# Patient Record
Sex: MALE | Race: WHITE | NOT HISPANIC OR LATINO | Employment: OTHER | URBAN - METROPOLITAN AREA
[De-identification: names, ages, dates, MRNs, and addresses within clinical notes are randomized per-mention and may not be internally consistent; named-entity substitution may affect disease eponyms.]

---

## 2017-01-23 NOTE — PROGRESS NOTES
HPI    SUBJECTIVE:                                                    Dago Dangelo is a 56 year old male who presents to clinic today for the following health issues:      RESPIRATORY SYMPTOMS      Duration: 2.5 months off an on    Description  sore throat, cough, headache, fatigue/malaise and chest congestion     Severity: moderate    Accompanying signs and symptoms: None    History (predisposing factors):  tobacco abuse    Precipitating or alleviating factors: None    Therapies tried and outcome:  Was given zithromax 2.5 months ago finished that, Has tried alker seltzer plus with no relief.      No shortness of breath, wheezing.  Cough is generally productive, but not always.  Is generally feeling a bit run down, but cough will often wake him in the night.  No fever, sore throat.  Feels cough is getting more significant.      Review of Systems   Constitutional: Positive for malaise/fatigue. Negative for fever.   HENT: Negative for congestion.    Respiratory: Positive for cough and sputum production.    Gastrointestinal: Negative.          Physical Exam   Constitutional: He is oriented to person, place, and time and well-developed, well-nourished, and in no distress.   Eyes: Conjunctivae and EOM are normal.   Cardiovascular: Normal rate, regular rhythm and normal heart sounds.    Pulmonary/Chest: Effort normal and breath sounds normal.   Musculoskeletal: He exhibits no edema.   Neurological: He is alert and oriented to person, place, and time.   Skin: Skin is warm and dry.   Vitals reviewed.      (R05) Cough  (primary encounter diagnosis)  Comment: normal XR, suspect dose in crease of lisinopril last fall triggered new cough  Plan: XR Chest 2 Views            (Z11.59) Need for hepatitis C screening test  Comment:   Plan: Hepatitis C Screen Reflex to HCV RNA Quant and         Genotype            (I10) Benign essential hypertension  Comment:   Plan: Albumin Random Urine Quantitative, Basic         metabolic  panel            (E11.9) Type 2 diabetes mellitus without complication, without long-term current use of insulin (H)  Comment:   Plan: Hemoglobin A1c, losartan (COZAAR) 100 MG tablet              RTC in 2w    Tl Ortiz MD

## 2017-01-24 ENCOUNTER — RADIANT APPOINTMENT (OUTPATIENT)
Dept: GENERAL RADIOLOGY | Facility: CLINIC | Age: 57
End: 2017-01-24
Attending: FAMILY MEDICINE
Payer: COMMERCIAL

## 2017-01-24 ENCOUNTER — OFFICE VISIT (OUTPATIENT)
Dept: FAMILY MEDICINE | Facility: CLINIC | Age: 57
End: 2017-01-24
Payer: COMMERCIAL

## 2017-01-24 VITALS
BODY MASS INDEX: 35.59 KG/M2 | OXYGEN SATURATION: 97 % | HEART RATE: 62 BPM | RESPIRATION RATE: 16 BRPM | SYSTOLIC BLOOD PRESSURE: 143 MMHG | DIASTOLIC BLOOD PRESSURE: 82 MMHG | WEIGHT: 213.9 LBS | TEMPERATURE: 98.2 F

## 2017-01-24 DIAGNOSIS — E11.9 TYPE 2 DIABETES MELLITUS WITHOUT COMPLICATION, WITHOUT LONG-TERM CURRENT USE OF INSULIN (H): ICD-10-CM

## 2017-01-24 DIAGNOSIS — Z11.59 NEED FOR HEPATITIS C SCREENING TEST: ICD-10-CM

## 2017-01-24 DIAGNOSIS — R05.9 COUGH: ICD-10-CM

## 2017-01-24 DIAGNOSIS — I10 BENIGN ESSENTIAL HYPERTENSION: ICD-10-CM

## 2017-01-24 DIAGNOSIS — R05.9 COUGH: Primary | ICD-10-CM

## 2017-01-24 LAB — HBA1C MFR BLD: 6.8 % (ref 4.3–6)

## 2017-01-24 PROCEDURE — 86803 HEPATITIS C AB TEST: CPT | Performed by: FAMILY MEDICINE

## 2017-01-24 PROCEDURE — 83036 HEMOGLOBIN GLYCOSYLATED A1C: CPT | Performed by: FAMILY MEDICINE

## 2017-01-24 PROCEDURE — 36415 COLL VENOUS BLD VENIPUNCTURE: CPT | Performed by: FAMILY MEDICINE

## 2017-01-24 PROCEDURE — 82043 UR ALBUMIN QUANTITATIVE: CPT | Performed by: FAMILY MEDICINE

## 2017-01-24 PROCEDURE — 71020 XR CHEST 2 VW: CPT

## 2017-01-24 PROCEDURE — 80048 BASIC METABOLIC PNL TOTAL CA: CPT | Performed by: FAMILY MEDICINE

## 2017-01-24 PROCEDURE — 99214 OFFICE O/P EST MOD 30 MIN: CPT | Performed by: FAMILY MEDICINE

## 2017-01-24 RX ORDER — LOSARTAN POTASSIUM 100 MG/1
100 TABLET ORAL DAILY
Qty: 30 TABLET | Refills: 1 | Status: SHIPPED | OUTPATIENT
Start: 2017-01-24 | End: 2017-03-19

## 2017-01-24 ASSESSMENT — ENCOUNTER SYMPTOMS
FEVER: 0
SPUTUM PRODUCTION: 1
GASTROINTESTINAL NEGATIVE: 1
COUGH: 1

## 2017-01-24 NOTE — NURSING NOTE
"Chief Complaint   Patient presents with     Follow Up For     reoccuring cough/bronchitis       Initial /82 mmHg  Pulse 62  Temp(Src) 98.2  F (36.8  C) (Oral)  Resp 16  Wt 213 lb 14.4 oz (97.024 kg)  SpO2 97% Estimated body mass index is 35.59 kg/(m^2) as calculated from the following:    Height as of 10/19/16: 5' 5\" (1.651 m).    Weight as of this encounter: 213 lb 14.4 oz (97.024 kg).  BP completed using cuff size: regular    Kassandra Sonny HUYNH    "

## 2017-01-24 NOTE — MR AVS SNAPSHOT
After Visit Summary   1/24/2017    Dago Dangelo    MRN: [id number]           Patient Information     Date Of Birth          1960        Visit Information        Provider Department      1/24/2017 4:00 PM Tl Ortiz MD [facility]        Today's Diagnoses     Cough    -  1     Need for hepatitis C screening test         Benign essential hypertension         Type 2 diabetes mellitus without complication, without long-term current use of insulin (H)            Follow-ups after your visit        Follow-up notes from your care team     Return in about 2 weeks (around 2/7/2017).      Who to contact     If you have questions or need follow up information about today's clinic visit or your schedule please contact [facility] directly at [phone].  Normal or non-critical lab and imaging results will be communicated to you by [employer], letter or phone within 4 business days after the clinic has received the results. If you do not hear from us within 7 days, please contact the clinic through [employer] or phone. If you have a critical or abnormal lab result, we will notify you by phone as soon as possible.  Submit refill requests through [employer] or call your pharmacy and they will forward the refill request to us. Please allow 3 business days for your refill to be completed.          Additional Information About Your Visit        MyChart Information     [employer] gives you secure access to your electronic health record. If you see a primary care provider, you can also send messages to your care team and make appointments. If you have questions, please call your primary care clinic.  If you do not have a primary care provider, please call [phone] and they will assist you.        Care EveryWhere ID     This is your Care EveryWhere ID. This could be used by other organizations to access your [place] medical records  [id number]        Your Vitals Were      Pulse Temperature Respirations Pulse Oximetry          62 98.2  F (36.8  C) (Oral) 16 97%         Blood Pressure from Last 3 Encounters:   01/24/17 143/82   11/14/16 134/80   10/28/16 115/74    Weight from Last 3 Encounters:   01/24/17 213 lb 14.4 oz (97.024 kg)   11/14/16 212 lb (96.163 kg)   10/19/16 211 lb (95.709 kg)              We Performed the Following     Albumin Random Urine Quantitative     Basic metabolic panel     Hemoglobin A1c     Hepatitis C Screen Reflex to HCV RNA Quant and Genotype          Today's Medication Changes          These changes are accurate as of: 1/24/17  4:52 PM.  If you have any questions, ask your nurse or doctor.               Start taking these medicines.        Dose/Directions    losartan 100 MG tablet   Commonly known as:  COZAAR   Used for:  Benign essential hypertension   Started by:  Tl Ortiz MD        Dose:  100 mg   Take 1 tablet (100 mg) by mouth daily   Quantity:  30 tablet   Refills:  1         Stop taking these medicines if you haven't already. Please contact your care team if you have questions.     lisinopril 40 MG tablet   Commonly known as:  PRINIVIL/ZESTRIL   Stopped by:  Tl Ortiz MD                Where to get your medicines      These medications were sent to [facility] [place] - [address]  [address] [place] [address]     Phone:  [phone]    - losartan 100 MG tablet             Primary Care Provider Office Phone # Fax #    Tl Ortiz [phone]       [facility] [address]  [address] [facility] [address]        Thank you!     Thank you for choosing [facility]  for your care. Our goal is always to provide you with excellent care. Hearing back from our patients is one way we can continue to improve our services. Please take a few minutes to complete the written survey that you may receive in the mail after your visit with us. Thank  you!             Your Updated Medication List - Protect others around you: Learn how to safely use, store and throw away your medicines at www.disposemymeds.org.          This list is accurate as of: 1/24/17  4:52 PM.  Always use your most recent med list.                   Brand Name Dispense Instructions for use    * ACCU-CHEK COMPLETE Kit     1 Box    1 Box 2 times daily Measure blood sugar 2x daily       * blood glucose monitoring meter device kit    no brand specified    1 kit    Use to test blood sugar 2 times daily or as directed. PT HAS NEW PLAN COVERAGE EFFECTIVE 9/1/16, [employer]ERS [employer]ODU[employer]       amLODIPine 5 MG tablet    NORVASC    90 tablet    Take 1 tablet (5 mg) by mouth daily       aspirin 81 MG EC tablet     100 tablet    Take 1 tablet (81 mg) by mouth daily       atorvastatin 40 MG tablet    LIPITOR    90 tablet    Take 1 tablet (40 mg) by mouth At Bedtime       blood glucose calibration solution    no brand specified    1 each    Use to calibrate blood glucose monitor as directed.       blood glucose monitoring test strip    no brand specified    3 Box    Use to test blood sugars 2 times daily or as directed-DISPENSE PER PT INSURANCE PER PT PREFERENCE, EFFECTIVE 9/1/16 PT HAS INSURANCE CHANGE TO [employer]       carvedilol 25 MG tablet    COREG    180 tablet    Take 1 tablet (25 mg) by mouth 2 times daily (with meals)       losartan 100 MG tablet    COZAAR    30 tablet    Take 1 tablet (100 mg) by mouth daily       metFORMIN 500 MG tablet    GLUCOPHAGE    180 tablet    Take 1 tablet (500 mg) by mouth 2 times daily (with meals)       multivitamin, therapeutic with minerals Tabs tablet      Take 1 tablet by mouth daily       nitroglycerin 0.4 MG sublingual tablet    NITROSTAT    25 tablet    Place 1 tablet (0.4 mg) under the tongue every 5 minutes as needed for chest pain if you are still having symptoms after 3 doses (15 minutes) call 911.       order for DME     1 Device    Blood pressure  cuff       ticagrelor 90 MG tablet    BRILINTA    180 tablet    Take 1 tablet (90 mg) by mouth every 12 hours       * varenicline 0.5 MG X 11 & 1 MG X 42 tablet    CHANTIX STARTING MONTH JOANNE    53 tablet    Take 0.5 mg tab daily for 3 days, then 0.5 mg tab twice daily for 4 days, then 1 mg twice daily.       * varenicline 1 MG tablet    CHANTIX    56 tablet    Take 1 tablet (1 mg) by mouth 2 times daily       * Notice:  This list has 4 medication(s) that are the same as other medications prescribed for you. Read the directions carefully, and ask your doctor or other care provider to review them with you.

## 2017-01-26 LAB
ANION GAP SERPL CALCULATED.3IONS-SCNC: 11 MMOL/L (ref 3–14)
BUN SERPL-MCNC: 15 MG/DL (ref 7–30)
CALCIUM SERPL-MCNC: 8.9 MG/DL (ref 8.5–10.1)
CHLORIDE SERPL-SCNC: 112 MMOL/L (ref 94–109)
CO2 SERPL-SCNC: 21 MMOL/L (ref 20–32)
CREAT SERPL-MCNC: 0.83 MG/DL (ref 0.66–1.25)
CREAT UR-MCNC: 149 MG/DL
GFR SERPL CREATININE-BSD FRML MDRD: ABNORMAL ML/MIN/1.7M2
GLUCOSE SERPL-MCNC: 99 MG/DL (ref 70–99)
HCV AB SERPL QL IA: NORMAL
MICROALBUMIN UR-MCNC: 85 MG/L
MICROALBUMIN/CREAT UR: 56.98 MG/G CR (ref 0–17)
POTASSIUM SERPL-SCNC: 4.2 MMOL/L (ref 3.4–5.3)
SODIUM SERPL-SCNC: 144 MMOL/L (ref 133–144)

## 2017-02-08 ENCOUNTER — OFFICE VISIT (OUTPATIENT)
Dept: FAMILY MEDICINE | Facility: CLINIC | Age: 57
End: 2017-02-08
Payer: COMMERCIAL

## 2017-02-08 VITALS
HEIGHT: 65 IN | DIASTOLIC BLOOD PRESSURE: 82 MMHG | RESPIRATION RATE: 14 BRPM | TEMPERATURE: 97.9 F | BODY MASS INDEX: 35.32 KG/M2 | OXYGEN SATURATION: 96 % | SYSTOLIC BLOOD PRESSURE: 136 MMHG | WEIGHT: 212 LBS | HEART RATE: 62 BPM

## 2017-02-08 DIAGNOSIS — R09.81 NASAL CONGESTION: ICD-10-CM

## 2017-02-08 DIAGNOSIS — R05.9 COUGH: Primary | ICD-10-CM

## 2017-02-08 DIAGNOSIS — F17.200 TOBACCO USE DISORDER: ICD-10-CM

## 2017-02-08 DIAGNOSIS — I10 BENIGN ESSENTIAL HYPERTENSION: ICD-10-CM

## 2017-02-08 LAB
FEF 25/75: NORMAL
FEV-1: NORMAL
FEV1/FVC: NORMAL
FVC: NORMAL

## 2017-02-08 PROCEDURE — 94010 BREATHING CAPACITY TEST: CPT | Performed by: FAMILY MEDICINE

## 2017-02-08 PROCEDURE — 99214 OFFICE O/P EST MOD 30 MIN: CPT | Mod: 25 | Performed by: FAMILY MEDICINE

## 2017-02-08 RX ORDER — FLUTICASONE PROPIONATE 50 MCG
1-2 SPRAY, SUSPENSION (ML) NASAL DAILY
Qty: 16 G | Refills: 1 | Status: SHIPPED | OUTPATIENT
Start: 2017-02-08 | End: 2017-07-07

## 2017-02-08 ASSESSMENT — ENCOUNTER SYMPTOMS
GASTROINTESTINAL NEGATIVE: 1
CONSTITUTIONAL NEGATIVE: 1
WHEEZING: 0
COUGH: 1
SPUTUM PRODUCTION: 0
SHORTNESS OF BREATH: 0

## 2017-02-08 NOTE — NURSING NOTE
"Chief Complaint   Patient presents with     Hypertension       Initial /82 mmHg  Pulse 62  Temp(Src) 97.9  F (36.6  C) (Oral)  Resp 14  Ht 5' 5\" (1.651 m)  Wt 212 lb (96.163 kg)  BMI 35.28 kg/m2  SpO2 96% Estimated body mass index is 35.28 kg/(m^2) as calculated from the following:    Height as of this encounter: 5' 5\" (1.651 m).    Weight as of this encounter: 212 lb (96.163 kg).  Medication Reconciliation: complete     Jaclyn Rosales CMA      "

## 2017-02-08 NOTE — PROGRESS NOTES
HPI   SUBJECTIVE:                                                    Dago Dangelo is a 56 year old male who presents to clinic today for the following health issues:    Following up on switch from Lisinopril to Losartan    Hypertension Follow-up      Outpatient blood pressures are being checked at home.  Results are high. 140-150's    Low Salt Diet: no added salt       Amount of exercise or physical activity: None    Problems taking medications regularly: No    Medication side effects: still suffering from cough    Diet: regular (no restrictions)    Checks home BP in the morning but has tested home meter against our and may run a bit high.  Numbers are pretty stable since switch to losartan.  Does not feel that cough is about the same, noting that he often wakes with sgnificant nasal congestion, when this starts to clear up, coughing typically starts.      Review of Systems   Constitutional: Negative.    HENT: Positive for congestion.    Respiratory: Positive for cough. Negative for sputum production, shortness of breath and wheezing.    Gastrointestinal: Negative.          Physical Exam   Constitutional: He is oriented to person, place, and time and well-developed, well-nourished, and in no distress.   Eyes: Conjunctivae and EOM are normal.   Cardiovascular: Normal rate, regular rhythm and normal heart sounds.    Pulmonary/Chest: Effort normal and breath sounds normal.   Musculoskeletal: He exhibits no edema.   Neurological: He is alert and oriented to person, place, and time.   Skin: Skin is warm and dry.   Vitals reviewed.      (R05) Cough  (primary encounter diagnosis)  Comment: technical problems with spirometery - unable to interpret  Plan: Spirometry, Breathing Capacity: Normal Order,         Clinic Performed            (R09.81) Nasal congestion  Comment:   Plan: nasal steroid, claritin    (F17.200) Tobacco use disorder  Comment:   Plan: Spirometry, Breathing Capacity: Normal Order,         Clinic  Performed            (I10) Benign essential hypertension  Comment:   Plan: increase amlodipine to 10mg      RTC in 2w    Tl Ortiz MD

## 2017-02-08 NOTE — MR AVS SNAPSHOT
After Visit Summary   2/8/2017    Dago Dangelo    MRN: [id number]           Patient Information     Date Of Birth          1960        Visit Information        Provider Department      2/8/2017 4:00 PM Tl Ortiz MD [facility]        Today's Diagnoses     Cough    -  1     Nasal congestion         Tobacco use disorder         Benign essential hypertension            Follow-ups after your visit        Follow-up notes from your care team     Return in about 2 weeks (around 2/22/2017).      Who to contact     If you have questions or need follow up information about today's clinic visit or your schedule please contact [facility] directly at [phone].  Normal or non-critical lab and imaging results will be communicated to you by MyChart, letter or phone within 4 business days after the clinic has received the results. If you do not hear from us within 7 days, please contact the clinic through "[employer]hart or phone. If you have a critical or abnormal lab result, we will notify you by phone as soon as possible.  Submit refill requests through [employer] or call your pharmacy and they will forward the refill request to us. Please allow 3 business days for your refill to be completed.          Additional Information About Your Visit        MyChart Information     [employer] gives you secure access to your electronic health record. If you see a primary care provider, you can also send messages to your care team and make appointments. If you have questions, please call your primary care clinic.  If you do not have a primary care provider, please call [phone] and they will assist you.        Care EveryWhere ID     This is your Care EveryWhere ID. This could be used by other organizations to access your [place] medical records  [id number]        Your Vitals Were     Pulse Temperature Respirations Height BMI (Body Mass Index) Pulse Oximetry    62 97.9  F (36.6  " C) (Oral) 14 5' 5\" (1.651 m) 35.28 kg/m2 96%       Blood Pressure from Last 3 Encounters:   02/08/17 136/82   01/24/17 143/82   11/14/16 134/80    Weight from Last 3 Encounters:   02/08/17 212 lb (96.163 kg)   01/24/17 213 lb 14.4 oz (97.024 kg)   11/14/16 212 lb (96.163 kg)              We Performed the Following     Spirometry, Breathing Capacity: Normal Order, Clinic Performed          Today's Medication Changes          These changes are accurate as of: 2/8/17  4:30 PM.  If you have any questions, ask your nurse or doctor.               Start taking these medicines.        Dose/Directions    fluticasone 50 MCG/ACT spray   Commonly known as:  FLONASE   Used for:  Nasal congestion, Cough   Started by:  Tl Ortiz MD        Dose:  1-2 spray   Spray 1-2 sprays into both nostrils daily   Quantity:  16 g   Refills:  1            Where to get your medicines      These medications were sent to [facility] [place], [place] - [address] [facility]  [address] [address] [facility] [address]     Phone:  [phone]    - fluticasone 50 MCG/ACT spray             Primary Care Provider Office Phone # Fax #    Tl Ortiz [phone]       [facility] [address]  [address] [facility] [address]        Thank you!     Thank you for choosing [facility]  for your care. Our goal is always to provide you with excellent care. Hearing back from our patients is one way we can continue to improve our services. Please take a few minutes to complete the written survey that you may receive in the mail after your visit with us. Thank you!             Your Updated Medication List - Protect others around you: Learn how to safely use, store and throw away your medicines at www.disposemymeds.org.          This list is accurate as of: 2/8/17  4:30 PM.  Always use your most recent med list.                   Brand Name Dispense Instructions for use    * ACCU-CHEK COMPLETE Kit "     1 Box    1 Box 2 times daily Measure blood sugar 2x daily       * blood glucose monitoring meter device kit    no brand specified    1 kit    Use to test blood sugar 2 times daily or as directed. PT HAS NEW PLAN COVERAGE EFFECTIVE 9/1/16, COVERS [employer]ODU[employer]       amLODIPine 5 MG tablet    NORVASC    90 tablet    Take 1 tablet (5 mg) by mouth daily       aspirin 81 MG EC tablet     100 tablet    Take 1 tablet (81 mg) by mouth daily       atorvastatin 40 MG tablet    LIPITOR    90 tablet    Take 1 tablet (40 mg) by mouth At Bedtime       blood glucose calibration solution    no brand specified    1 each    Use to calibrate blood glucose monitor as directed.       blood glucose monitoring test strip    no brand specified    3 Box    Use to test blood sugars 2 times daily or as directed-DISPENSE PER PT INSURANCE PER PT PREFERENCE, EFFECTIVE 9/1/16 PT HAS INSURANCE CHANGE TO [employer]       carvedilol 25 MG tablet    COREG    180 tablet    Take 1 tablet (25 mg) by mouth 2 times daily (with meals)       fluticasone 50 MCG/ACT spray    FLONASE    16 g    Spray 1-2 sprays into both nostrils daily       losartan 100 MG tablet    COZAAR    30 tablet    Take 1 tablet (100 mg) by mouth daily       metFORMIN 500 MG tablet    GLUCOPHAGE    180 tablet    Take 1 tablet (500 mg) by mouth 2 times daily (with meals)       multivitamin, therapeutic with minerals Tabs tablet      Take 1 tablet by mouth daily       nitroglycerin 0.4 MG sublingual tablet    NITROSTAT    25 tablet    Place 1 tablet (0.4 mg) under the tongue every 5 minutes as needed for chest pain if you are still having symptoms after 3 doses (15 minutes) call 911.       order for DME     1 Device    Blood pressure cuff       ticagrelor 90 MG tablet    BRILINTA    180 tablet    Take 1 tablet (90 mg) by mouth every 12 hours       * varenicline 0.5 MG X 11 & 1 MG X 42 tablet    CHANTIX STARTING MONTH JOANNE    53 tablet    Take 0.5 mg tab daily for 3 days, then  0.5 mg tab twice daily for 4 days, then 1 mg twice daily.       * varenicline 1 MG tablet    CHANTIX    56 tablet    Take 1 tablet (1 mg) by mouth 2 times daily       * Notice:  This list has 4 medication(s) that are the same as other medications prescribed for you. Read the directions carefully, and ask your doctor or other care provider to review them with you.

## 2017-02-09 DIAGNOSIS — I10 BENIGN ESSENTIAL HYPERTENSION: ICD-10-CM

## 2017-02-09 DIAGNOSIS — I21.4 NON-STEMI (NON-ST ELEVATED MYOCARDIAL INFARCTION) (H): Primary | ICD-10-CM

## 2017-02-09 NOTE — TELEPHONE ENCOUNTER
Norvasc 5mg         Last Written Prescription Date: 11/14/2016  Last Fill Quantity: 90, # refills: 1    Last Office Visit with G, P or [facility] prescribing provider:  02/08/2017   Future Office Visit:      BP Readings from Last 3 Encounters:   02/08/17 136/82   01/24/17 143/82   11/14/16 134/80     ALT       33   12/19/2015  CHOL      131   3/25/2016  HDL       31   3/25/2016  LDL       44   3/25/2016  TRIG      279   3/25/2016  No results found for this basename: tyler Will CPhT  [facility]  [phone]

## 2017-02-09 NOTE — TELEPHONE ENCOUNTER
Pt is taking two a day. We would need a new script that states that. Pt is okay through friday. Thank you.  Caitlyn Will CPhT  [facility]  [phone]

## 2017-02-10 RX ORDER — AMLODIPINE BESYLATE 5 MG/1
10 TABLET ORAL DAILY
Qty: 180 TABLET | Refills: 1 | Status: SHIPPED | OUTPATIENT
Start: 2017-02-10 | End: 2017-09-22

## 2017-02-10 NOTE — TELEPHONE ENCOUNTER
Norvasc 5 mg  Routing refill request to provider for review/approval because:Medication is reported/historical (Pt is taking two a day we would need a new script that states that. Pt is okay through Friday)    Thank You   Alessio Wolff RPh [facility]

## 2017-03-01 ENCOUNTER — OFFICE VISIT (OUTPATIENT)
Dept: FAMILY MEDICINE | Facility: CLINIC | Age: 57
End: 2017-03-01
Payer: COMMERCIAL

## 2017-03-01 VITALS
DIASTOLIC BLOOD PRESSURE: 80 MMHG | TEMPERATURE: 98.7 F | RESPIRATION RATE: 20 BRPM | WEIGHT: 210 LBS | HEART RATE: 68 BPM | OXYGEN SATURATION: 96 % | SYSTOLIC BLOOD PRESSURE: 126 MMHG | BODY MASS INDEX: 34.95 KG/M2

## 2017-03-01 DIAGNOSIS — I10 BENIGN ESSENTIAL HYPERTENSION: Primary | ICD-10-CM

## 2017-03-01 PROCEDURE — 99213 OFFICE O/P EST LOW 20 MIN: CPT | Performed by: FAMILY MEDICINE

## 2017-03-01 ASSESSMENT — ENCOUNTER SYMPTOMS
SPUTUM PRODUCTION: 0
CARDIOVASCULAR NEGATIVE: 1
CONSTITUTIONAL NEGATIVE: 1
COUGH: 1
SHORTNESS OF BREATH: 0

## 2017-03-01 NOTE — PROGRESS NOTES
HPI    SUBJECTIVE:                                                    Dago Dangelo is a 56 year old male who presents to clinic today for the following health issues:      Hypertension Follow-up      Outpatient blood pressures are being checked at home.  Results are 140/80-85.    Low Salt Diet: no added salt       Amount of exercise or physical activity: None    Problems taking medications regularly: No    Medication side effects: none  Diet: regular (no restrictions)    Checked BP this am, about an hour after taking medication.  No side effects from medication.    Notes cough is still present but is decidedly better.  Did fill Rx for Chantix, looking to start next week.    Review of Systems   Constitutional: Negative.    HENT: Negative.    Respiratory: Positive for cough. Negative for sputum production and shortness of breath.    Cardiovascular: Negative.  Negative for leg swelling.         Physical Exam   Constitutional: He is oriented to person, place, and time and well-developed, well-nourished, and in no distress.   Eyes: Conjunctivae and EOM are normal.   Cardiovascular: Normal rate, regular rhythm and normal heart sounds.    Pulmonary/Chest: Effort normal and breath sounds normal.   Musculoskeletal: He exhibits no edema.   Neurological: He is alert and oriented to person, place, and time.   Skin: Skin is warm and dry.   Vitals reviewed.    (I10) Benign essential hypertension  (primary encounter diagnosis)  Comment: seems well  controlled with increase to 10mg amlopdine  Plan: contnue      RTC in     Tl Ortiz MD

## 2017-03-01 NOTE — NURSING NOTE
"Chief Complaint   Patient presents with     Hypertension       Initial /80 (BP Location: Right arm, Patient Position: Chair, Cuff Size: Adult Regular)  Pulse 68  Temp 98.7  F (37.1  C) (Oral)  Resp 20  Wt 210 lb (95.3 kg)  SpO2 96%  BMI 34.95 kg/m2 Estimated body mass index is 34.95 kg/(m^2) as calculated from the following:    Height as of 2/8/17: 5' 5\" (1.651 m).    Weight as of this encounter: 210 lb (95.3 kg).  Medication Reconciliation: complete   Shelley Yañez MA      "

## 2017-03-01 NOTE — MR AVS SNAPSHOT
After Visit Summary   3/1/2017    Dago Dangelo    MRN: [id number]           Patient Information     Date Of Birth          1960        Visit Information        Provider Department      3/1/2017 4:00 PM Tl Ortiz MD [facility]        Today's Diagnoses     Benign essential hypertension    -  1       Follow-ups after your visit        Follow-up notes from your care team     Return in about 1 month (around 4/1/2017).      Who to contact     If you have questions or need follow up information about today's clinic visit or your schedule please contact [facility] directly at [phone].  Normal or non-critical lab and imaging results will be communicated to you by [employer], letter or phone within 4 business days after the clinic has received the results. If you do not hear from us within 7 days, please contact the clinic through [employer] or phone. If you have a critical or abnormal lab result, we will notify you by phone as soon as possible.  Submit refill requests through [employer] or call your pharmacy and they will forward the refill request to us. Please allow 3 business days for your refill to be completed.          Additional Information About Your Visit        MyChart Information     [employer] gives you secure access to your electronic health record. If you see a primary care provider, you can also send messages to your care team and make appointments. If you have questions, please call your primary care clinic.  If you do not have a primary care provider, please call [phone] and they will assist you.        Care EveryWhere ID     This is your Care EveryWhere ID. This could be used by other organizations to access your [place] medical records  [id number]        Your Vitals Were     Pulse Temperature Respirations Pulse Oximetry BMI (Body Mass Index)       68 98.7  F (37.1  C) (Oral) 20 96% 34.95 kg/m2        Blood Pressure from Last 3  Encounters:   03/01/17 126/80   02/08/17 136/82   01/24/17 143/82    Weight from Last 3 Encounters:   03/01/17 210 lb (95.3 kg)   02/08/17 212 lb (96.2 kg)   01/24/17 213 lb 14.4 oz (97 kg)              Today, you had the following     No orders found for display       Primary Care Provider Office Phone # Fax #    Tl Jimmie Ortiz [phone]       [facility] [address]  [facility] [address]        Thank you!     Thank you for choosing [facility]  for your care. Our goal is always to provide you with excellent care. Hearing back from our patients is one way we can continue to improve our services. Please take a few minutes to complete the written survey that you may receive in the mail after your visit with us. Thank you!             Your Updated Medication List - Protect others around you: Learn how to safely use, store and throw away your medicines at www.disposemymeds.org.          This list is accurate as of: 3/1/17  4:25 PM.  Always use your most recent med list.                   Brand Name Dispense Instructions for use    * ACCU-CHEK COMPLETE Kit     1 Box    1 Box 2 times daily Measure blood sugar 2x daily       * blood glucose monitoring meter device kit    no brand specified    1 kit    Use to test blood sugar 2 times daily or as directed. PT HAS NEW PLAN COVERAGE EFFECTIVE 9/1/16, COVERS CONTOUR PRODUCTS       amLODIPine 5 MG tablet    NORVASC    180 tablet    Take 2 tablets (10 mg) by mouth daily       aspirin 81 MG EC tablet     100 tablet    Take 1 tablet (81 mg) by mouth daily       atorvastatin 40 MG tablet    LIPITOR    90 tablet    Take 1 tablet (40 mg) by mouth At Bedtime       blood glucose calibration solution    no brand specified    1 each    Use to calibrate blood glucose monitor as directed.       blood glucose monitoring test strip    no brand specified    3 Box    Use to test blood sugars 2 times daily or as directed-DISPENSE PER PT  INSURANCE PER PT PREFERENCE, EFFECTIVE 9/1/16 PT HAS INSURANCE CHANGE TO CONTOUR PRODUCTS       carvedilol 25 MG tablet    COREG    180 tablet    Take 1 tablet (25 mg) by mouth 2 times daily (with meals)       fluticasone 50 MCG/ACT spray    FLONASE    16 g    Spray 1-2 sprays into both nostrils daily       losartan 100 MG tablet    COZAAR    30 tablet    Take 1 tablet (100 mg) by mouth daily       metFORMIN 500 MG tablet    GLUCOPHAGE    180 tablet    Take 1 tablet (500 mg) by mouth 2 times daily (with meals)       multivitamin, therapeutic with minerals Tabs tablet      Take 1 tablet by mouth daily       nitroglycerin 0.4 MG sublingual tablet    NITROSTAT    25 tablet    Place 1 tablet (0.4 mg) under the tongue every 5 minutes as needed for chest pain if you are still having symptoms after 3 doses (15 minutes) call 911.       order for DME     1 Device    Blood pressure cuff       ticagrelor 90 MG tablet    BRILINTA    180 tablet    Take 1 tablet (90 mg) by mouth every 12 hours       * varenicline 0.5 MG X 11 & 1 MG X 42 tablet    CHANTIX STARTING MONTH JOANNE    53 tablet    Take 0.5 mg tab daily for 3 days, then 0.5 mg tab twice daily for 4 days, then 1 mg twice daily.       * varenicline 1 MG tablet    CHANTIX    56 tablet    Take 1 tablet (1 mg) by mouth 2 times daily       * Notice:  This list has 4 medication(s) that are the same as other medications prescribed for you. Read the directions carefully, and ask your doctor or other care provider to review them with you.

## 2017-03-19 ENCOUNTER — MYC REFILL (OUTPATIENT)
Dept: FAMILY MEDICINE | Facility: CLINIC | Age: 57
End: 2017-03-19

## 2017-03-19 DIAGNOSIS — I10 BENIGN ESSENTIAL HYPERTENSION: ICD-10-CM

## 2017-03-20 RX ORDER — LOSARTAN POTASSIUM 100 MG/1
100 TABLET ORAL DAILY
Qty: 90 TABLET | Refills: 1 | Status: SHIPPED | OUTPATIENT
Start: 2017-03-20 | End: 2017-09-22

## 2017-03-20 NOTE — TELEPHONE ENCOUNTER
losartan (COZAAR) 100 MG tablet 30 tablet 1 1/24/2017  --   Sig: Take 1 tablet (100 mg) by mouth daily           Last Written Prescription Date: 1.24.17  Last Fill Quantity: 30, # refills: 1  Last Office Visit with [facility] or [facility] prescribing provider: 3.1.17       Potassium   Date Value Ref Range Status   01/24/2017 4.2 3.4 - 5.3 mmol/L Final     Creatinine   Date Value Ref Range Status   01/24/2017 0.83 0.66 - 1.25 mg/dL Final     BP Readings from Last 3 Encounters:   03/01/17 126/80   02/08/17 136/82   01/24/17 143/82     Prescription approved per [facility] Refill Protocol  Thi Garcia RN BS

## 2017-03-20 NOTE — TELEPHONE ENCOUNTER
Message from MyChart:  Original authorizing provider: MD Jono Villagran would like a refill of the following medications:  losartan (COZAAR) 100 MG tablet [Tl Ortiz MD]    Preferred pharmacy: [facility], [place] - [address] [address]    Comment:

## 2017-04-03 ENCOUNTER — OFFICE VISIT (OUTPATIENT)
Dept: FAMILY MEDICINE | Facility: CLINIC | Age: 57
End: 2017-04-03

## 2017-04-03 VITALS
SYSTOLIC BLOOD PRESSURE: 138 MMHG | TEMPERATURE: 98.1 F | DIASTOLIC BLOOD PRESSURE: 80 MMHG | OXYGEN SATURATION: 96 % | HEART RATE: 64 BPM | BODY MASS INDEX: 35.48 KG/M2 | WEIGHT: 213.2 LBS

## 2017-04-03 DIAGNOSIS — Z53.9 ERRONEOUS ENCOUNTER--DISREGARD: Primary | ICD-10-CM

## 2017-04-03 ASSESSMENT — ENCOUNTER SYMPTOMS
NEUROLOGICAL NEGATIVE: 1
CARDIOVASCULAR NEGATIVE: 1
RESPIRATORY NEGATIVE: 1

## 2017-04-03 NOTE — NURSING NOTE
"Chief Complaint   Patient presents with     Medication Follow-up       Initial /80 (BP Location: Right arm, Patient Position: Chair, Cuff Size: Adult Large)  Pulse 64  Temp 98.1  F (36.7  C) (Oral)  Wt 213 lb 3.2 oz (96.7 kg)  SpO2 96%  BMI 35.48 kg/m2 Estimated body mass index is 35.48 kg/(m^2) as calculated from the following:    Height as of 2/8/17: 5' 5\" (1.651 m).    Weight as of this encounter: 213 lb 3.2 oz (96.7 kg).  BP completed using cuff size: kevan Yañez MA    "

## 2017-04-03 NOTE — PROGRESS NOTES
HPI    SUBJECTIVE:                                                    Dago Dangelo is a 56 year old male who presents to clinic today for the following health issues:    Medication Followup of all of them:     Taking Medication as prescribed: yes    Side Effects:  None    Medication Helping Symptoms:  yes     Is using Chantix, still smoking but less.  Smoking about 1/2 ppd.  NO CP, dyspnea, palpitations.    Unsure about why he was coming in - thought we needed to do labs, but was advised to f/u in 3m.  Last seen about 2 weeks ago.  No concerns with medications, just got refills.    Review of Systems   Respiratory: Negative.    Cardiovascular: Negative.    Neurological: Negative.          Physical Exam   Constitutional: He is well-developed, well-nourished, and in no distress.   Psychiatric: Mood and affect normal.   Vitals reviewed.    No charge - erroneous visit.

## 2017-04-03 NOTE — MR AVS SNAPSHOT
After Visit Summary   4/3/2017    Dago Dangelo    MRN: [id number]           Patient Information     Date Of Birth          1960        Visit Information        Provider Department      4/3/2017 4:00 PM Tl Ortiz MD [facility]        Today's Diagnoses     ERRONEOUS ENCOUNTER--DISREGARD    -  1       Follow-ups after your visit        Follow-up notes from your care team     Return in about 3 months (around 7/3/2017).      Who to contact     If you have questions or need follow up information about today's clinic visit or your schedule please contact [facility] directly at [phone].  Normal or non-critical lab and imaging results will be communicated to you by MyChart, letter or phone within 4 business days after the clinic has received the results. If you do not hear from us within 7 days, please contact the clinic through [employer] or phone. If you have a critical or abnormal lab result, we will notify you by phone as soon as possible.  Submit refill requests through [employer] or call your pharmacy and they will forward the refill request to us. Please allow 3 business days for your refill to be completed.          Additional Information About Your Visit        MyChart Information     [employer] gives you secure access to your electronic health record. If you see a primary care provider, you can also send messages to your care team and make appointments. If you have questions, please call your primary care clinic.  If you do not have a primary care provider, please call [phone] and they will assist you.        Care EveryWhere ID     This is your Care EveryWhere ID. This could be used by other organizations to access your [place] medical records  [id number]        Your Vitals Were     Pulse Temperature Pulse Oximetry BMI (Body Mass Index)          64 98.1  F (36.7  C) (Oral) 96% 35.48 kg/m2         Blood Pressure from Last 3 Encounters:    04/03/17 138/80   03/01/17 126/80   02/08/17 136/82    Weight from Last 3 Encounters:   04/03/17 213 lb 3.2 oz (96.7 kg)   03/01/17 210 lb (95.3 kg)   02/08/17 212 lb (96.2 kg)              Today, you had the following     No orders found for display       Primary Care Provider Office Phone # Fax #    Tl Jimmie Ortiz [phone]       [facility] [address]  [facility] [address]        Thank you!     Thank you for choosing [facility]  for your care. Our goal is always to provide you with excellent care. Hearing back from our patients is one way we can continue to improve our services. Please take a few minutes to complete the written survey that you may receive in the mail after your visit with us. Thank you!             Your Updated Medication List - Protect others around you: Learn how to safely use, store and throw away your medicines at www.disposemymeds.org.          This list is accurate as of: 4/3/17  4:12 PM.  Always use your most recent med list.                   Brand Name Dispense Instructions for use    * ACCU-CHEK COMPLETE Kit     1 Box    1 Box 2 times daily Measure blood sugar 2x daily       * blood glucose monitoring meter device kit    no brand specified    1 kit    Use to test blood sugar 2 times daily or as directed. PT HAS NEW PLAN COVERAGE EFFECTIVE 9/1/16, COVERS CONTOUR PRODUCTS       amLODIPine 5 MG tablet    NORVASC    180 tablet    Take 2 tablets (10 mg) by mouth daily       aspirin 81 MG EC tablet     100 tablet    Take 1 tablet (81 mg) by mouth daily       atorvastatin 40 MG tablet    LIPITOR    90 tablet    Take 1 tablet (40 mg) by mouth At Bedtime       blood glucose calibration solution    no brand specified    1 each    Use to calibrate blood glucose monitor as directed.       blood glucose monitoring test strip    no brand specified    3 Box    Use to test blood sugars 2 times daily or as directed-DISPENSE PER PT INSURANCE  PER PT PREFERENCE, EFFECTIVE 9/1/16 PT HAS INSURANCE CHANGE TO [employer]ODUCTS       carvedilol 25 MG tablet    COREG    180 tablet    Take 1 tablet (25 mg) by mouth 2 times daily (with meals)       fluticasone 50 MCG/ACT spray    FLONASE    16 g    Spray 1-2 sprays into both nostrils daily       losartan 100 MG tablet    COZAAR    90 tablet    Take 1 tablet (100 mg) by mouth daily       metFORMIN 500 MG tablet    GLUCOPHAGE    180 tablet    Take 1 tablet (500 mg) by mouth 2 times daily (with meals)       multivitamin, therapeutic with minerals Tabs tablet      Take 1 tablet by mouth daily       nitroglycerin 0.4 MG sublingual tablet    NITROSTAT    25 tablet    Place 1 tablet (0.4 mg) under the tongue every 5 minutes as needed for chest pain if you are still having symptoms after 3 doses (15 minutes) call 911.       order for DME     1 Device    Blood pressure cuff       ticagrelor 90 MG tablet    BRILINTA    180 tablet    Take 1 tablet (90 mg) by mouth every 12 hours       * varenicline 0.5 MG X 11 & 1 MG X 42 tablet    CHANTIX STARTING MONTH JOANNE    53 tablet    Take 0.5 mg tab daily for 3 days, then 0.5 mg tab twice daily for 4 days, then 1 mg twice daily.       * varenicline 1 MG tablet    CHANTIX    56 tablet    Take 1 tablet (1 mg) by mouth 2 times daily       * Notice:  This list has 4 medication(s) that are the same as other medications prescribed for you. Read the directions carefully, and ask your doctor or other care provider to review them with you.

## 2017-04-23 ENCOUNTER — MYC REFILL (OUTPATIENT)
Dept: FAMILY MEDICINE | Facility: CLINIC | Age: 57
End: 2017-04-23

## 2017-04-23 DIAGNOSIS — I21.4 NON-STEMI (NON-ST ELEVATED MYOCARDIAL INFARCTION) (H): ICD-10-CM

## 2017-04-24 RX ORDER — CARVEDILOL 25 MG/1
25 TABLET ORAL 2 TIMES DAILY WITH MEALS
Qty: 180 TABLET | Refills: 0 | Status: SHIPPED | OUTPATIENT
Start: 2017-04-24 | End: 2017-07-22

## 2017-04-24 NOTE — TELEPHONE ENCOUNTER
See below, RTC July,  refill extended, sent mychart response  BP Readings from Last 2 Encounters:   04/03/17 138/80   03/01/17 126/80     Lab Results   Component Value Date    CR 0.83 01/24/2017     Lab Results   Component Value Date    POTASSIUM 4.2 01/24/2017       Prescription approved per [facility] Refill Protocol.  Delilah Guallpa RN, BSN

## 2017-04-24 NOTE — TELEPHONE ENCOUNTER
Message from [employer]:  Original authorizing provider: MD Jono Villagran would like a refill of the following medications:  carvedilol (COREG) 25 MG tablet [Tl Ortiz MD]    Preferred pharmacy: [facility] [address] [address]    Comment:

## 2017-04-27 ENCOUNTER — TELEPHONE (OUTPATIENT)
Dept: FAMILY MEDICINE | Facility: CLINIC | Age: 57
End: 2017-04-27

## 2017-04-27 NOTE — TELEPHONE ENCOUNTER
Panel Management Review      Patient has the following on his problem list:     Diabetes    ASA: Passed    Last A1C  Lab Results   Component Value Date    A1C 6.8 01/24/2017    A1C 6.5 09/21/2016    A1C 6.6 03/25/2016    A1C 6.6 12/18/2015     A1C tested: Passed    Last LDL:    Lab Results   Component Value Date    CHOL 131 03/25/2016     Lab Results   Component Value Date    HDL 31 03/25/2016     Lab Results   Component Value Date    LDL 44 03/25/2016     Lab Results   Component Value Date    TRIG 279 03/25/2016     No results found for: CHOLHDLRATIO  Lab Results   Component Value Date    NHDL 100 03/25/2016       Is the patient on a Statin? YES             Is the patient on Aspirin? YES    Medications     HMG CoA Reductase Inhibitors    atorvastatin (LIPITOR) 40 MG tablet    Salicylates    aspirin 81 MG EC tablet          Last three blood pressure readings:  BP Readings from Last 3 Encounters:   04/03/17 138/80   03/01/17 126/80   02/08/17 136/82       Date of last diabetes office visit: 3/25/16     Tobacco History:     History   Smoking Status     Current Some Day Smoker     Packs/day: 0.50     Years: 30.00     Types: Cigarettes   Smokeless Tobacco     Never Used     Comment: may have one once weekly         Hypertension   Last three blood pressure readings:  BP Readings from Last 3 Encounters:   04/03/17 138/80   03/01/17 126/80   02/08/17 136/82     Blood pressure: Passed    HTN Guidelines:  Age 18-59 BP range:  Less than 140/90  Age 60-85 with Diabetes:  Less than 140/90  Age 60-85 without Diabetes:  less than 150/90      Composite cancer screening  Chart review shows that this patient is due/due soon for the following None  Summary:    Patient is due/failing the following:   PHYSICAL    Action needed:   Patient needs office visit for physical with fasting labs.    Type of outreach:    Sent [employer] message.    Questions for provider review:    None                                                                                                                                     Maggi Pepe MA     Chart routed to Care Team .

## 2017-04-27 NOTE — LETTER
[address], [address]  [facility] [address]  [phone]  May 11, 2017    Dago Dangelo  [address] [facility] [address]      Dear Dago,    I care about your health and have reviewed your health plan.  I have reviewed your medical conditions, medication list, and lab results and am making recommendations  based on this review, to better manage your health.    You are particularly in need of attention regarding:  -Wellness (Physical) Visit    I am recommending that you:  -schedule a WELLNESS (Physical) APPOINTMENT with me.   I will check fasting labs the same day - nothing to eat except water and meds for 8-10 hours prior.      Here is a list of Health Maintenance topics that are due now or due soon:  Health Maintenance Due   Topic Date Due     ADVANCE DIRECTIVE PLANNING Q5 YRS (NO INBASKET)  11/05/1977     FOOT EXAM Q1 YEAR( NO INBASKET)  03/25/2017     LIPID MONITORING Q1 YEAR( NO INBASKET)  03/25/2017     EYE EXAM Q1 YEAR( NO INBASKET)  04/28/2017       Please call us at [phone] (or use [employer]) to address the above   recommendations.    Thank you for trusting [facility] and we appreciate the opportunity to serve you.  We look forward to supporting your healthcare needs in the future.    Healthy Regards,    Tl Ortiz MD

## 2017-04-30 ENCOUNTER — MYC REFILL (OUTPATIENT)
Dept: FAMILY MEDICINE | Facility: CLINIC | Age: 57
End: 2017-04-30

## 2017-04-30 DIAGNOSIS — I21.4 NON-STEMI (NON-ST ELEVATED MYOCARDIAL INFARCTION) (H): ICD-10-CM

## 2017-05-01 NOTE — TELEPHONE ENCOUNTER
Message from [employer]:  Original authorizing provider: MD Jono Conner would like a refill of the following medications:  ticagrelor (BRILINTA) 90 MG tablet [Jamia Sánchez MD]    Preferred pharmacy: [facility] [address] [address]    Comment:

## 2017-07-07 ENCOUNTER — OFFICE VISIT (OUTPATIENT)
Dept: FAMILY MEDICINE | Facility: CLINIC | Age: 57
End: 2017-07-07
Payer: COMMERCIAL

## 2017-07-07 VITALS
TEMPERATURE: 99 F | BODY MASS INDEX: 34.78 KG/M2 | DIASTOLIC BLOOD PRESSURE: 80 MMHG | OXYGEN SATURATION: 95 % | SYSTOLIC BLOOD PRESSURE: 130 MMHG | RESPIRATION RATE: 16 BRPM | WEIGHT: 209 LBS | HEART RATE: 74 BPM

## 2017-07-07 DIAGNOSIS — F17.200 TOBACCO USE DISORDER: ICD-10-CM

## 2017-07-07 DIAGNOSIS — E78.5 HYPERLIPIDEMIA LDL GOAL <70: ICD-10-CM

## 2017-07-07 DIAGNOSIS — I10 BENIGN ESSENTIAL HYPERTENSION: Primary | ICD-10-CM

## 2017-07-07 DIAGNOSIS — E11.9 TYPE 2 DIABETES MELLITUS WITHOUT COMPLICATION, WITHOUT LONG-TERM CURRENT USE OF INSULIN (H): ICD-10-CM

## 2017-07-07 LAB
CHOLEST SERPL-MCNC: 175 MG/DL
HBA1C MFR BLD: 6.7 % (ref 4.3–6)
HDLC SERPL-MCNC: 30 MG/DL
LDLC SERPL CALC-MCNC: ABNORMAL MG/DL
LDLC SERPL DIRECT ASSAY-MCNC: 68 MG/DL
NONHDLC SERPL-MCNC: 145 MG/DL
TRIGL SERPL-MCNC: 684 MG/DL

## 2017-07-07 PROCEDURE — 36415 COLL VENOUS BLD VENIPUNCTURE: CPT | Performed by: FAMILY MEDICINE

## 2017-07-07 PROCEDURE — 83721 ASSAY OF BLOOD LIPOPROTEIN: CPT | Performed by: FAMILY MEDICINE

## 2017-07-07 PROCEDURE — 99214 OFFICE O/P EST MOD 30 MIN: CPT | Performed by: FAMILY MEDICINE

## 2017-07-07 PROCEDURE — 83036 HEMOGLOBIN GLYCOSYLATED A1C: CPT | Performed by: FAMILY MEDICINE

## 2017-07-07 PROCEDURE — 80061 LIPID PANEL: CPT | Performed by: FAMILY MEDICINE

## 2017-07-07 ASSESSMENT — ENCOUNTER SYMPTOMS
SENSORY CHANGE: 0
BLURRED VISION: 0
DOUBLE VISION: 0
TINGLING: 0
GASTROINTESTINAL NEGATIVE: 1
NEUROLOGICAL NEGATIVE: 1
CARDIOVASCULAR NEGATIVE: 1
RESPIRATORY NEGATIVE: 1
CONSTITUTIONAL NEGATIVE: 1

## 2017-07-07 NOTE — MR AVS SNAPSHOT
After Visit Summary   7/7/2017    Dago Dangelo    MRN: [id number]           Patient Information     Date Of Birth          1960        Visit Information        Provider Department      7/7/2017 7:00 AM Tl Ortiz MD [facility]        Today's Diagnoses     Benign essential hypertension    -  1    Tobacco use disorder        Type 2 diabetes mellitus without complication, without long-term current use of insulin (H)        Hyperlipidemia LDL goal <70           Follow-ups after your visit        Follow-up notes from your care team     Return in about 6 months (around 1/7/2018).      Who to contact     If you have questions or need follow up information about today's clinic visit or your schedule please contact [facility] directly at [phone].  Normal or non-critical lab and imaging results will be communicated to you by [employer], letter or phone within 4 business days after the clinic has received the results. If you do not hear from us within 7 days, please contact the clinic through [employer] or phone. If you have a critical or abnormal lab result, we will notify you by phone as soon as possible.  Submit refill requests through [employer] or call your pharmacy and they will forward the refill request to us. Please allow 3 business days for your refill to be completed.          Additional Information About Your Visit        MyChart Information     [employer] gives you secure access to your electronic health record. If you see a primary care provider, you can also send messages to your care team and make appointments. If you have questions, please call your primary care clinic.  If you do not have a primary care provider, please call [phone] and they will assist you.        Care EveryWhere ID     This is your Care EveryWhere ID. This could be used by other organizations to access your [place] medical records  [id number]        Your Vitals Were      Pulse Temperature Respirations Pulse Oximetry BMI (Body Mass Index)       74 99  F (37.2  C) (Oral) 16 95% 34.78 kg/m2        Blood Pressure from Last 3 Encounters:   07/07/17 130/80   04/03/17 138/80   03/01/17 126/80    Weight from Last 3 Encounters:   07/07/17 209 lb (94.8 kg)   04/03/17 213 lb 3.2 oz (96.7 kg)   03/01/17 210 lb (95.3 kg)              We Performed the Following     Hemoglobin A1c     Lipid panel reflex to direct LDL        Primary Care Provider Office Phone # Fax #    Tl Jimmie Ortiz [phone]       [facility] [address]  [facility] [address]        Equal Access to Services     CRISTIAN LYNN : Hadii rafita vicente hadasho Soomaali, waaxda luqadaha, qaybta kaalmada adeegyada, waxay sandiin haybhavnan alec grissom. So [facility] [phone].    ATENCIÓN: Si habla español, tiene a alejandre disposición servicios gratuitos de asistencia lingüística. Llame al [phone].    We comply with applicable federal civil rights laws and [place] laws. We do not discriminate on the basis of race, color, national origin, age, disability sex, sexual orientation or gender identity.            Thank you!     Thank you for choosing [facility]  for your care. Our goal is always to provide you with excellent care. Hearing back from our patients is one way we can continue to improve our services. Please take a few minutes to complete the written survey that you may receive in the mail after your visit with us. Thank you!             Your Updated Medication List - Protect others around you: Learn how to safely use, store and throw away your medicines at www.disposemymeds.org.          This list is accurate as of: 7/7/17  7:41 AM.  Always use your most recent med list.                   Brand Name Dispense Instructions for use Diagnosis    * ACCU-CHEK COMPLETE Kit     1 Box    1 Box 2 times daily Measure blood sugar 2x daily    Type 2 diabetes mellitus without complication  (H)       * blood glucose monitoring meter device kit    no brand specified    1 kit    Use to test blood sugar 2 times daily or as directed. PT HAS NEW PLAN COVERAGE EFFECTIVE 9/1/16, COVERS CONTOUR PRODUCTS    Type 2 diabetes mellitus without complication (H)       amLODIPine 5 MG tablet    NORVASC    180 tablet    Take 2 tablets (10 mg) by mouth daily    Non-STEMI (non-ST elevated myocardial infarction) (H), Benign essential hypertension       aspirin 81 MG EC tablet     100 tablet    Take 1 tablet (81 mg) by mouth daily    Non-STEMI (non-ST elevated myocardial infarction) (H)       atorvastatin 40 MG tablet    LIPITOR    90 tablet    Take 1 tablet (40 mg) by mouth At Bedtime    Non-STEMI (non-ST elevated myocardial infarction) (H)       blood glucose calibration solution    no brand specified    1 each    Use to calibrate blood glucose monitor as directed.    Type 2 diabetes mellitus without complication (H)       blood glucose monitoring test strip    no brand specified    3 Box    Use to test blood sugars 2 times daily or as directed-DISPENSE PER PT INSURANCE PER PT PREFERENCE, EFFECTIVE 9/1/16 PT HAS INSURANCE CHANGE TO [employer]ODUCTS    Type 2 diabetes mellitus without complication (H)       carvedilol 25 MG tablet    COREG    180 tablet    Take 1 tablet (25 mg) by mouth 2 times daily (with meals)    Non-STEMI (non-ST elevated myocardial infarction) (H)       losartan 100 MG tablet    COZAAR    90 tablet    Take 1 tablet (100 mg) by mouth daily    Benign essential hypertension       metFORMIN 500 MG tablet    GLUCOPHAGE    180 tablet    Take 1 tablet (500 mg) by mouth 2 times daily (with meals)    Type 2 diabetes mellitus without complication, without long-term current use of insulin (H)       multivitamin, therapeutic with minerals Tabs tablet      Take 1 tablet by mouth daily        nitroGLYcerin 0.4 MG sublingual tablet    NITROSTAT    25 tablet    Place 1 tablet (0.4 mg) under the tongue every 5  minutes as needed for chest pain if you are still having symptoms after 3 doses (15 minutes) call 911.    Non-STEMI (non-ST elevated myocardial infarction) (H)       order for DME     1 Device    Blood pressure cuff    Benign essential hypertension       ticagrelor 90 MG tablet    BRILINTA    180 tablet    Take 1 tablet (90 mg) by mouth every 12 hours    Non-STEMI (non-ST elevated myocardial infarction) (H)       * varenicline 0.5 MG X 11 & 1 MG X 42 tablet    CHANTIX STARTING MONTH JOANNE    53 tablet    Take 0.5 mg tab daily for 3 days, then 0.5 mg tab twice daily for 4 days, then 1 mg twice daily.    Tobacco use disorder       * varenicline 1 MG tablet    CHANTIX    56 tablet    Take 1 tablet (1 mg) by mouth 2 times daily    Tobacco use disorder       * Notice:  This list has 4 medication(s) that are the same as other medications prescribed for you. Read the directions carefully, and ask your doctor or other care provider to review them with you.

## 2017-07-07 NOTE — NURSING NOTE
"Chief Complaint   Patient presents with     Diabetes     fasting        Initial /80 (BP Location: Right arm, Patient Position: Chair, Cuff Size: Adult Large)  Pulse 74  Temp 99  F (37.2  C) (Oral)  Resp 16  Wt 209 lb (94.8 kg)  SpO2 95%  BMI 34.78 kg/m2 Estimated body mass index is 34.78 kg/(m^2) as calculated from the following:    Height as of 2/8/17: 5' 5\" (1.651 m).    Weight as of this encounter: 209 lb (94.8 kg).  Medication Reconciliation: complete.Jose MELENDREZ MA      "

## 2017-07-07 NOTE — PROGRESS NOTES
HPI    SUBJECTIVE:                                                    Dago Dangelo is a 56 year old male who presents to clinic today for the following health issues:      Diabetes Follow-up      Patient is checking blood sugars: on occasion     Diabetic concerns: None     Symptoms of hypoglycemia (low blood sugar): none     Paresthesias (numbness or burning in feet) or sores: No     Date of last diabetic eye exam: 5/2017    Hyperlipidemia Follow-Up      Rate your low fat/cholesterol diet?: not monitoring fat    Taking statin?  Yes, no muscle aches from statin    Other lipid medications/supplements?:  none    Hypertension Follow-up      Outpatient blood pressures are being checked at home.  Results are usually close to 140/80.    Low Salt Diet: no added salt      Amount of exercise or physical activity: none besides work    Problems taking medications regularly: No    Medication side effects: dry skin    Diet: regular (no restrictions)        Feeling well, no acute concerns.  Last eye exam May'17, no issues per Jono.  Did try Chantix, wasn't able to make it work.        Review of Systems   Constitutional: Negative.    Eyes: Negative for blurred vision and double vision.   Respiratory: Negative.    Cardiovascular: Negative.    Gastrointestinal: Negative.    Neurological: Negative.  Negative for tingling and sensory change.         Physical Exam   Constitutional: He is oriented to person, place, and time and well-developed, well-nourished, and in no distress.   Eyes: Conjunctivae and EOM are normal.   Cardiovascular: Normal rate, regular rhythm and normal heart sounds.    Pulses:       Dorsalis pedis pulses are 2+ on the right side, and 2+ on the left side.   Pulmonary/Chest: Effort normal and breath sounds normal.   Musculoskeletal: He exhibits no edema.   Neurological: He is alert and oriented to person, place, and time. He displays no weakness. Gait normal.   Reflex Scores:       Patellar reflexes are 2+ on the  right side and 2+ on the left side.       Achilles reflexes are 2+ on the right side and 2+ on the left side.  Nl filament and proprioception in feet JOSIE   Skin: Skin is warm and dry.   Skin on feet healthy.  No wounds, callous, onychomycosis.   Vitals reviewed.    (I10) Benign essential hypertension  (primary encounter diagnosis)  Comment: =  Plan: well conctrolled today    (F17.200) Tobacco use disorder  Comment:   Plan: will try again someday    (E11.9) Type 2 diabetes mellitus without complication, without long-term current use of insulin (H)  Comment: Stable, approriate  Plan: Hemoglobin A1c, Lipid panel reflex to direct         LDL            (E78.5) Hyperlipidemia LDL goal <70  Comment: rechecking  Plan: Lipid panel reflex to direct LDL              RTC in 6m    Tl Ortiz MD

## 2017-07-10 PROBLEM — E78.1 HYPERTRIGLYCERIDEMIA: Status: ACTIVE | Noted: 2017-07-10

## 2017-07-11 ENCOUNTER — MYC MEDICAL ADVICE (OUTPATIENT)
Dept: FAMILY MEDICINE | Facility: CLINIC | Age: 57
End: 2017-07-11

## 2017-07-11 DIAGNOSIS — E78.1 HYPERTRIGLYCERIDEMIA: Primary | ICD-10-CM

## 2017-07-22 ENCOUNTER — MYC REFILL (OUTPATIENT)
Dept: FAMILY MEDICINE | Facility: CLINIC | Age: 57
End: 2017-07-22

## 2017-07-22 DIAGNOSIS — I21.4 NON-STEMI (NON-ST ELEVATED MYOCARDIAL INFARCTION) (H): ICD-10-CM

## 2017-07-24 ENCOUNTER — TELEPHONE (OUTPATIENT)
Dept: FAMILY MEDICINE | Facility: CLINIC | Age: 57
End: 2017-07-24

## 2017-07-24 DIAGNOSIS — E11.9 TYPE 2 DIABETES MELLITUS WITHOUT COMPLICATION, WITHOUT LONG-TERM CURRENT USE OF INSULIN (H): Primary | ICD-10-CM

## 2017-07-24 RX ORDER — CARVEDILOL 25 MG/1
25 TABLET ORAL 2 TIMES DAILY WITH MEALS
Qty: 180 TABLET | Refills: 3 | Status: SHIPPED | OUTPATIENT
Start: 2017-07-24 | End: 2018-07-22

## 2017-07-24 NOTE — TELEPHONE ENCOUNTER
carvedilol (COREG) 25 MG tablet 180 tablet 0 4/24/2017  No   Sig: Take 1 tablet (25 mg) by mouth 2 times daily (with meals)   Class: E-Prescribe           Last Written Prescription Date: 4.24.17  Last Fill Quantity: 180, # refills: 0  Last Office Visit with [facility] or [facility] prescribing provider: 7.7.17       Potassium   Date Value Ref Range Status   01/24/2017 4.2 3.4 - 5.3 mmol/L Final     Creatinine   Date Value Ref Range Status   01/24/2017 0.83 0.66 - 1.25 mg/dL Final     BP Readings from Last 3 Encounters:   07/07/17 130/80   04/03/17 138/80   03/01/17 126/80         Prescription approved per [facility] Refill Protocol  Thi Garcia RN BS

## 2017-07-24 NOTE — TELEPHONE ENCOUNTER
We had received new scripts for a meter and test strips. Wondering if we could also get a script for Lancets. Thank you.    Caitlyn Will, [facility]  [phone]

## 2017-07-24 NOTE — TELEPHONE ENCOUNTER
Message from [employer]:  Original authorizing provider: MD Jono Villagran would like a refill of the following medications:  carvedilol (COREG) 25 MG tablet [Tl Ortiz MD]    Preferred pharmacy: [facility] [id number] [address]    Comment:

## 2017-09-22 DIAGNOSIS — E11.9 TYPE 2 DIABETES MELLITUS WITHOUT COMPLICATION, WITHOUT LONG-TERM CURRENT USE OF INSULIN (H): ICD-10-CM

## 2017-09-22 DIAGNOSIS — I21.4 NON-STEMI (NON-ST ELEVATED MYOCARDIAL INFARCTION) (H): ICD-10-CM

## 2017-09-22 DIAGNOSIS — I10 BENIGN ESSENTIAL HYPERTENSION: ICD-10-CM

## 2017-09-22 RX ORDER — ATORVASTATIN CALCIUM 40 MG/1
TABLET, FILM COATED ORAL
Qty: 90 TABLET | Refills: 2 | Status: SHIPPED | OUTPATIENT
Start: 2017-09-22 | End: 2018-07-04

## 2017-09-22 RX ORDER — AMLODIPINE BESYLATE 5 MG/1
TABLET ORAL
Qty: 180 TABLET | Refills: 1 | Status: SHIPPED | OUTPATIENT
Start: 2017-09-22 | End: 2018-03-18

## 2017-09-22 RX ORDER — LOSARTAN POTASSIUM 100 MG/1
TABLET ORAL
Qty: 90 TABLET | Refills: 1 | Status: SHIPPED | OUTPATIENT
Start: 2017-09-22 | End: 2018-03-18

## 2017-09-22 NOTE — TELEPHONE ENCOUNTER
atorvastatin (LIPITOR) 40 MG tablet     Last Written Prescription Date: 6/20/17  Last Fill Quantity: 90, # refills: 0  Last Office Visit with [facility], [facility] or [facility] prescribing provider: 7/7/2017       Lab Results   Component Value Date    CHOL 175 07/07/2017     Lab Results   Component Value Date    HDL 30 07/07/2017     Lab Results   Component Value Date    LDL  07/07/2017     Cannot estimate LDL when triglyceride exceeds 400 mg/dL    LDL 68 07/07/2017     Lab Results   Component Value Date    TRIG 684 07/07/2017     No results found for: CHOLHDLRATIO    ________________________________________________________________________________    metFORMIN (GLUCOPHAGE) 500 MG tablet         Last Written Prescription Date: 6/20/17  Last Fill Quantity: 180, # refills: 0  Last Office Visit with [facility], [facility] or [facility] prescribing provider:  7/7/2017        BP Readings from Last 3 Encounters:   07/07/17 130/80   04/03/17 138/80   03/01/17 126/80     Lab Results   Component Value Date    MICROL 85 01/24/2017     Lab Results   Component Value Date    UMALCR 56.98 01/24/2017     Creatinine   Date Value Ref Range Status   01/24/2017 0.83 0.66 - 1.25 mg/dL Final   ]  GFR Estimate   Date Value Ref Range Status   01/24/2017 >90  Non  GFR Calc   >60 mL/min/1.7m2 Final   01/05/2016 87 >60 mL/min/1.7m2 Final   12/22/2015 84 >60 mL/min/1.7m2 Final     Comment:     Non  GFR Calc     GFR Estimate If Black   Date Value Ref Range Status   01/24/2017 >90   GFR Calc   >60 mL/min/1.7m2 Final   01/05/2016 >90 >60 mL/min/1.7m2 Final   12/22/2015 >90   GFR Calc   >60 mL/min/1.7m2 Final     Lab Results   Component Value Date    CHOL 175 07/07/2017     Lab Results   Component Value Date    HDL 30 07/07/2017     Lab Results   Component Value Date    LDL  07/07/2017     Cannot estimate LDL when triglyceride exceeds 400 mg/dL    LDL 68 07/07/2017     Lab Results   Component Value Date    TRIG  684 07/07/2017     No results found for: CHOLHDLRATIO  Lab Results   Component Value Date    AST 19 12/19/2015     Lab Results   Component Value Date    ALT 33 12/19/2015     Lab Results   Component Value Date    A1C 6.7 07/07/2017    A1C 6.8 01/24/2017    A1C 6.5 09/21/2016    A1C 6.6 03/25/2016    A1C 6.6 12/18/2015     Potassium   Date Value Ref Range Status   01/24/2017 4.2 3.4 - 5.3 mmol/L Final     ________________________________________________________________________________    amLODIPine (NORVASC) 5 MG tablet      Last Written Prescription Date: 2/10/17  Last Fill Quantity: 180, # refills: 1    Last Office Visit with [facility], [facility] or  Health prescribing provider:  7/7/2017       BP Readings from Last 3 Encounters:   07/07/17 130/80   04/03/17 138/80   03/01/17 126/80     ________________________________________________________________________________    losartan (COZAAR) 100 MG tablet      Last Written Prescription Date: 3/20/17  Last Fill Quantity: 90, # refills: 1  Last Office Visit with [facility], [facility] or [facility] prescribing provider: 7/7/2017         Potassium   Date Value Ref Range Status   01/24/2017 4.2 3.4 - 5.3 mmol/L Final     Creatinine   Date Value Ref Range Status   01/24/2017 0.83 0.66 - 1.25 mg/dL Final     BP Readings from Last 3 Encounters:   07/07/17 130/80   04/03/17 138/80   03/01/17 126/80         YANET Chowdhury  September 22, 2017  8:58 AM

## 2017-09-22 NOTE — TELEPHONE ENCOUNTER
Prescription approved per [facility] Refill Protocol.  Brunilda Mansfield RN

## 2017-10-05 ENCOUNTER — ALLIED HEALTH/NURSE VISIT (OUTPATIENT)
Dept: NURSING | Facility: CLINIC | Age: 57
End: 2017-10-05
Payer: COMMERCIAL

## 2017-10-05 DIAGNOSIS — E78.1 HYPERTRIGLYCERIDEMIA: ICD-10-CM

## 2017-10-05 DIAGNOSIS — Z23 NEED FOR PROPHYLACTIC VACCINATION AND INOCULATION AGAINST INFLUENZA: Primary | ICD-10-CM

## 2017-10-05 LAB
CHOLEST SERPL-MCNC: 168 MG/DL
HDLC SERPL-MCNC: 33 MG/DL
LDLC SERPL CALC-MCNC: ABNORMAL MG/DL
LDLC SERPL DIRECT ASSAY-MCNC: 68 MG/DL
NONHDLC SERPL-MCNC: 135 MG/DL
TRIGL SERPL-MCNC: 574 MG/DL

## 2017-10-05 PROCEDURE — 80061 LIPID PANEL: CPT | Performed by: FAMILY MEDICINE

## 2017-10-05 PROCEDURE — 90686 IIV4 VACC NO PRSV 0.5 ML IM: CPT

## 2017-10-05 PROCEDURE — 83721 ASSAY OF BLOOD LIPOPROTEIN: CPT | Mod: 59 | Performed by: FAMILY MEDICINE

## 2017-10-05 PROCEDURE — 36415 COLL VENOUS BLD VENIPUNCTURE: CPT | Performed by: FAMILY MEDICINE

## 2017-10-05 PROCEDURE — 99207 ZZC NO CHARGE NURSE ONLY: CPT

## 2017-10-05 PROCEDURE — 90471 IMMUNIZATION ADMIN: CPT

## 2017-10-05 NOTE — MR AVS SNAPSHOT
After Visit Summary   10/5/2017    Dago Dangelo    MRN: [id number]           Patient Information     Date Of Birth          1960        Visit Information        Provider Department      10/5/2017 11:00 AM FM NURSE [facility]        Today's Diagnoses     Need for prophylactic vaccination and inoculation against influenza    -  1       Follow-ups after your visit        Your next 10 appointments already scheduled     Oct 05, 2017 11:00 AM CDT   Nurse Only with FM NURSE   [facility]    [address] [place] [address], [address]  [facility] [address] [phone]              Who to contact     If you have questions or need follow up information about today's clinic visit or your schedule please contact [facility] directly at [phone].  Normal or non-critical lab and imaging results will be communicated to you by MyChart, letter or phone within 4 business days after the clinic has received the results. If you do not hear from us within 7 days, please contact the clinic through MyChart or phone. If you have a critical or abnormal lab result, we will notify you by phone as soon as possible.  Submit refill requests through [employer] or call your pharmacy and they will forward the refill request to us. Please allow 3 business days for your refill to be completed.          Additional Information About Your Visit        MyChart Information     [employer] gives you secure access to your electronic health record. If you see a primary care provider, you can also send messages to your care team and make appointments. If you have questions, please call your primary care clinic.  If you do not have a primary care provider, please call [phone] and they will assist you.        Care EveryWhere ID     This is your Care EveryWhere ID. This could be used by other organizations to access your [place] medical records  [id number]          Blood Pressure from Last 3 Encounters:   07/07/17 130/80   04/03/17 138/80   03/01/17 126/80    Weight from Last 3 Encounters:   07/07/17 209 lb (94.8 kg)   04/03/17 213 lb 3.2 oz (96.7 kg)   03/01/17 210 lb (95.3 kg)              We Performed the Following     FLU VAC, SPLIT VIRUS IM > 3 YO (QUADRIVALENT) [83320]     Vaccine Administration, Initial [51315]        Primary Care Provider Office Phone # Fax #    Tl Jimmie Ortiz [phone] [address]  [facility] [address]        Equal Access to Services     CRISTIAN LYNN : Hadii rafita saalmanca Sonikhil, waaxda luqadaha, qaybta kaalmada adejuliann, dianna grissom. So [facility] [phone].    ATENCIÓN: Si habla español, tiene a alejandre disposición servicios gratuitos de asistencia lingüística. Llame al [phone].    We comply with applicable federal civil rights laws and [place] laws. We do not discriminate on the basis of race, color, national origin, age, disability, sex, sexual orientation, or gender identity.            Thank you!     Thank you for choosing [facility]  for your care. Our goal is always to provide you with excellent care. Hearing back from our patients is one way we can continue to improve our services. Please take a few minutes to complete the written survey that you may receive in the mail after your visit with us. Thank you!             Your Updated Medication List - Protect others around you: Learn how to safely use, store and throw away your medicines at www.disposemymeds.org.          This list is accurate as of: 10/5/17  9:28 AM.  Always use your most recent med list.                   Brand Name Dispense Instructions for use Diagnosis    * ACCU-CHEK COMPLETE Kit     1 Box    1 Box 2 times daily Measure blood sugar 2x daily    Type 2 diabetes mellitus without complication (H)       * blood glucose monitoring meter device kit    no brand specified    1 kit    Use to  test blood sugar 2 times daily or as directed. PT HAS NEW PLAN COVERAGE EFFECTIVE 9/1/16, COVERS CONTOUR PRODUCTS    Type 2 diabetes mellitus without complication (H)       amLODIPine 5 MG tablet    NORVASC    180 tablet    TAKE TWO TABLETS BY MOUTH EVERY DAY    Non-STEMI (non-ST elevated myocardial infarction) (H), Benign essential hypertension       aspirin 81 MG EC tablet     100 tablet    Take 1 tablet (81 mg) by mouth daily    Non-STEMI (non-ST elevated myocardial infarction) (H)       atorvastatin 40 MG tablet    LIPITOR    90 tablet    TAKE ONE TABLET BY MOUTH EVERY DAY AT BEDTIME    Non-STEMI (non-ST elevated myocardial infarction) (H)       blood glucose calibration solution    no brand specified    1 each    Use to calibrate blood glucose monitor as directed.    Type 2 diabetes mellitus without complication (H)       blood glucose lancets standard    no brand specified    100 each    Use to test blood sugar 2 times daily or as directed.    Type 2 diabetes mellitus without complication, without long-term current use of insulin (H)       blood glucose monitoring test strip    no brand specified    3 Box    Use to test blood sugars 2 times daily or as directed-DISPENSE PER PT INSURANCE PER PT PREFERENCE, EFFECTIVE 9/1/16 PT HAS INSURANCE CHANGE TO [employer]    Type 2 diabetes mellitus without complication (H)       carvedilol 25 MG tablet    COREG    180 tablet    Take 1 tablet (25 mg) by mouth 2 times daily (with meals)    Non-STEMI (non-ST elevated myocardial infarction) (H)       losartan 100 MG tablet    COZAAR    90 tablet    TAKE ONE TABLET BY MOUTH EVERY DAY    Benign essential hypertension       metFORMIN 500 MG tablet    GLUCOPHAGE    180 tablet    TAKE ONE TABLET BY MOUTH TWICE A DAY WITH MEALS    Type 2 diabetes mellitus without complication, without long-term current use of insulin (H)       multivitamin, therapeutic with minerals Tabs tablet      Take 1 tablet by mouth daily         nitroGLYcerin 0.4 MG sublingual tablet    NITROSTAT    25 tablet    Place 1 tablet (0.4 mg) under the tongue every 5 minutes as needed for chest pain if you are still having symptoms after 3 doses (15 minutes) call 911.    Non-STEMI (non-ST elevated myocardial infarction) (H)       order for DME     1 Device    Blood pressure cuff    Benign essential hypertension       ticagrelor 90 MG tablet    BRILINTA    180 tablet    Take 1 tablet (90 mg) by mouth every 12 hours    Non-STEMI (non-ST elevated myocardial infarction) (H)       * varenicline 0.5 MG X 11 & 1 MG X 42 tablet    CHANTIX STARTING MONTH JOANNE    53 tablet    Take 0.5 mg tab daily for 3 days, then 0.5 mg tab twice daily for 4 days, then 1 mg twice daily.    Tobacco use disorder       * varenicline 1 MG tablet    CHANTIX    56 tablet    Take 1 tablet (1 mg) by mouth 2 times daily    Tobacco use disorder       * Notice:  This list has 4 medication(s) that are the same as other medications prescribed for you. Read the directions carefully, and ask your doctor or other care provider to review them with you.

## 2017-10-05 NOTE — PROGRESS NOTES
Injectable Influenza Immunization Documentation    1.  Is the person to be vaccinated sick today?   No    2. Does the person to be vaccinated have an allergy to a component   of the vaccine?   No    3. Has the person to be vaccinated ever had a serious reaction   to influenza vaccine in the past?   No    4. Has the person to be vaccinated ever had Guillain-Barré syndrome?   No    Form completed by Jono

## 2017-10-22 DIAGNOSIS — I21.4 NON-STEMI (NON-ST ELEVATED MYOCARDIAL INFARCTION) (H): ICD-10-CM

## 2017-10-24 RX ORDER — ASPIRIN 81 MG
TABLET, DELAYED RELEASE (ENTERIC COATED) ORAL
Qty: 100 TABLET | Refills: 2 | Status: SHIPPED | OUTPATIENT
Start: 2017-10-24 | End: 2018-02-20

## 2017-11-17 ENCOUNTER — OFFICE VISIT (OUTPATIENT)
Dept: FAMILY MEDICINE | Facility: CLINIC | Age: 57
End: 2017-11-17
Payer: COMMERCIAL

## 2017-11-17 VITALS
RESPIRATION RATE: 20 BRPM | WEIGHT: 215.7 LBS | SYSTOLIC BLOOD PRESSURE: 126 MMHG | DIASTOLIC BLOOD PRESSURE: 80 MMHG | BODY MASS INDEX: 35.89 KG/M2 | TEMPERATURE: 99 F | HEART RATE: 80 BPM

## 2017-11-17 DIAGNOSIS — R07.89 ATYPICAL CHEST PAIN: Primary | ICD-10-CM

## 2017-11-17 DIAGNOSIS — R45.4 IRRITABILITY: ICD-10-CM

## 2017-11-17 PROCEDURE — 99214 OFFICE O/P EST MOD 30 MIN: CPT | Performed by: FAMILY MEDICINE

## 2017-11-17 ASSESSMENT — ENCOUNTER SYMPTOMS
PALPITATIONS: 0
ORTHOPNEA: 0
NERVOUS/ANXIOUS: 0
HEADACHES: 0
GASTROINTESTINAL NEGATIVE: 1
CONSTITUTIONAL NEGATIVE: 1
INSOMNIA: 0
SHORTNESS OF BREATH: 0
COUGH: 0

## 2017-11-17 NOTE — PROGRESS NOTES
HPI    SUBJECTIVE:   Dago Dangelo is a 57 year old male who presents to clinic today for the following health issues:    Abnormal Mood Symptoms  Onset: x2 days    Description:   Depression: no  Anxiety: YES- work related    Accompanying Signs & Symptoms:  Still participating in activities that you used to enjoy: no  Fatigue: YES  Irritability: YES  Difficulty concentrating: no  Changes in appetite: no  Problems with sleep: no  Heart racing/beating fast : no  Thoughts of hurting yourself or others: none    History:   Recent stress: YES- job related  Prior depression hospitalization: None  Family history of depression: no  Family history of anxiety: no    Precipitating factors:   Alcohol/drug use: no    Alleviating factors:  Looking for a new job; has a 3 year old granddaughter that brings shira    Therapies Tried and outcome: None    Had a really tough day at work a few days ago.  Later that night while quiet at home had a sudden onset of sharp central chest pain.  Off the last couple of days, did pop into work this afternoon and pain happened again, although lighter.  Didn't feel like MI pain previously.  Seems to get worse with particularly movements, lasted 2-3 minutes per episode.  No shortness of breath, KING.  Did use nitroglycerin with one episode, didn't seem to do anything in particular.    Does  for factory.  They first time was particularly frustrating day.  Feels that essential job process is a little more stressful in the last several weeks with the hire of a new .  But also feels that he is not dealing with typical frustrations as well as he used to - harder to let it, bothering him more.  Generally feels sleep is fine.  UP 1-2 times nightly to urinate, can fall back asleep better.    No personal history of mood disorder, no family history.    Review of Systems   Constitutional: Negative.    Respiratory: Negative for cough and shortness of breath.    Cardiovascular:  Positive for chest pain. Negative for palpitations, orthopnea and leg swelling.   Gastrointestinal: Negative.    Neurological: Negative for headaches.   Psychiatric/Behavioral: The patient is not nervous/anxious and does not have insomnia.         Irritability         Physical Exam   Constitutional: He is oriented to person, place, and time and well-developed, well-nourished, and in no distress.   Eyes: Conjunctivae and EOM are normal.   Cardiovascular: Normal rate, regular rhythm and normal heart sounds.    Pulmonary/Chest: Effort normal and breath sounds normal.   Musculoskeletal: He exhibits no edema.   Neurological: He is alert and oriented to person, place, and time.   Skin: Skin is warm and dry.   Vitals reviewed.    (R07.89) Atypical chest pain  (primary encounter diagnosis)  Comment: unlikley to be cardiac given pattern timing and character of pain, likely MSK  Plan: reassured, advised to seek ED care if pain changes character    (R45.4) Irritability  Comment: more workplace stress, may be developing anxiety/panic.  May be early anxiety, too soon to diagnose  Plan: looks for ways to take breaks, be aware of reinforcing patterns      RTC in 2w    Tl Ortiz MD

## 2017-11-17 NOTE — MR AVS SNAPSHOT
After Visit Summary   11/17/2017    Dago Dangelo    MRN: [id number]           Patient Information     Date Of Birth          1960        Visit Information        Provider Department      11/17/2017 2:00 PM Tl Ortiz MD [facility]        Today's Diagnoses     Atypical chest pain    -  1    Irritability           Follow-ups after your visit        Follow-up notes from your care team     Return in about 2 weeks (around 12/1/2017).      Who to contact     If you have questions or need follow up information about today's clinic visit or your schedule please contact [facility] directly at [phone].  Normal or non-critical lab and imaging results will be communicated to you by Webyoghart, letter or phone within 4 business days after the clinic has received the results. If you do not hear from us within 7 days, please contact the clinic through Webyoghart or phone. If you have a critical or abnormal lab result, we will notify you by phone as soon as possible.  Submit refill requests through Jolicloud or call your pharmacy and they will forward the refill request to us. Please allow 3 business days for your refill to be completed.          Additional Information About Your Visit        MyChart Information     Jolicloud gives you secure access to your electronic health record. If you see a primary care provider, you can also send messages to your care team and make appointments. If you have questions, please call your primary care clinic.  If you do not have a primary care provider, please call [phone] and they will assist you.        Care EveryWhere ID     This is your Care EveryWhere ID. This could be used by other organizations to access your [place] medical records  [id number]        Your Vitals Were     Pulse Temperature Respirations BMI (Body Mass Index)          80 99  F (37.2  C) (Oral) 20 35.89 kg/m2         Blood Pressure from Last 3 Encounters:    11/17/17 126/80   07/07/17 130/80   04/03/17 138/80    Weight from Last 3 Encounters:   11/17/17 215 lb 11.2 oz (97.8 kg)   07/07/17 209 lb (94.8 kg)   04/03/17 213 lb 3.2 oz (96.7 kg)              Today, you had the following     No orders found for display       Primary Care Provider Office Phone # Fax #    Tl Jimmie Ortiz [phone]       [id number]  [address] [facility] [address]        Equal Access to Services     [facility]: Hadii aad ku hadasho Soomaali, waaxda luqadaha, qaybta kaalmada adeegyada, dianna morataya . So [facility] [phone].    ATENCIÓN: Si habla español, tiene a alejandre disposición servicios gratuitos de asistencia lingüística. [facility] [phone].    We comply with applicable federal civil rights laws and [place] laws. We do not discriminate on the basis of race, color, national origin, age, disability, sex, sexual orientation, or gender identity.            Thank you!     Thank you for choosing [facility]  for your care. Our goal is always to provide you with excellent care. Hearing back from our patients is one way we can continue to improve our services. Please take a few minutes to complete the written survey that you may receive in the mail after your visit with us. Thank you!             Your Updated Medication List - Protect others around you: Learn how to safely use, store and throw away your medicines at www.disposemymeds.org.          This list is accurate as of: 11/17/17  2:25 PM.  Always use your most recent med list.                   Brand Name Dispense Instructions for use Diagnosis    * ACCU-CHEK COMPLETE Kit     1 Box    1 Box 2 times daily Measure blood sugar 2x daily    Type 2 diabetes mellitus without complication (H)       * blood glucose monitoring meter device kit    no brand specified    1 kit    Use to test blood sugar 2 times daily or as directed. PT HAS NEW PLAN COVERAGE EFFECTIVE 9/1/16, COVERS  CONTOUR PRODUCTS    Type 2 diabetes mellitus without complication (H)       amLODIPine 5 MG tablet    NORVASC    180 tablet    TAKE TWO TABLETS BY MOUTH EVERY DAY    Non-STEMI (non-ST elevated myocardial infarction) (H), Benign essential hypertension       ASPIRIN LOW DOSE 81 MG EC tablet   Generic drug:  aspirin     100 tablet    TAKE ONE TABLET BY MOUTH EVERY DAY    Non-STEMI (non-ST elevated myocardial infarction) (H)       atorvastatin 40 MG tablet    LIPITOR    90 tablet    TAKE ONE TABLET BY MOUTH EVERY DAY AT BEDTIME    Non-STEMI (non-ST elevated myocardial infarction) (H)       blood glucose calibration solution    no brand specified    1 each    Use to calibrate blood glucose monitor as directed.    Type 2 diabetes mellitus without complication (H)       blood glucose lancets standard    no brand specified    100 each    Use to test blood sugar 2 times daily or as directed.    Type 2 diabetes mellitus without complication, without long-term current use of insulin (H)       blood glucose monitoring test strip    no brand specified    3 Box    Use to test blood sugars 2 times daily or as directed-DISPENSE PER PT INSURANCE PER PT PREFERENCE, EFFECTIVE 9/1/16 PT HAS INSURANCE CHANGE TO [employer] PRODUCTS    Type 2 diabetes mellitus without complication (H)       carvedilol 25 MG tablet    COREG    180 tablet    Take 1 tablet (25 mg) by mouth 2 times daily (with meals)    Non-STEMI (non-ST elevated myocardial infarction) (H)       losartan 100 MG tablet    COZAAR    90 tablet    TAKE ONE TABLET BY MOUTH EVERY DAY    Benign essential hypertension       metFORMIN 500 MG tablet    GLUCOPHAGE    180 tablet    TAKE ONE TABLET BY MOUTH TWICE A DAY WITH MEALS    Type 2 diabetes mellitus without complication, without long-term current use of insulin (H)       multivitamin, therapeutic with minerals Tabs tablet      Take 1 tablet by mouth daily        nitroGLYcerin 0.4 MG sublingual tablet    NITROSTAT    25 tablet     Place 1 tablet (0.4 mg) under the tongue every 5 minutes as needed for chest pain if you are still having symptoms after 3 doses (15 minutes) call 911.    Non-STEMI (non-ST elevated myocardial infarction) (H)       order for DME     1 Device    Blood pressure cuff    Benign essential hypertension       ticagrelor 90 MG tablet    BRILINTA    180 tablet    Take 1 tablet (90 mg) by mouth every 12 hours    Non-STEMI (non-ST elevated myocardial infarction) (H)       * Notice:  This list has 2 medication(s) that are the same as other medications prescribed for you. Read the directions carefully, and ask your doctor or other care provider to review them with you.

## 2017-11-17 NOTE — NURSING NOTE
"Chief Complaint   Patient presents with     Anxiety     excessive stress at work     Letter for School/Work     needs letter for possible FMLA prior to finding new employment       Initial /80  Pulse 80  Temp 99  F (37.2  C) (Oral)  Resp 20  Wt 215 lb 11.2 oz (97.8 kg)  BMI 35.89 kg/m2 Estimated body mass index is 35.89 kg/(m^2) as calculated from the following:    Height as of 2/8/17: 5' 5\" (1.651 m).    Weight as of this encounter: 215 lb 11.2 oz (97.8 kg).  Medication Reconciliation: complete   Yokasta Bhakta CMA (AAMA)      "

## 2017-11-19 ENCOUNTER — MYC REFILL (OUTPATIENT)
Dept: FAMILY MEDICINE | Facility: CLINIC | Age: 57
End: 2017-11-19

## 2017-11-19 DIAGNOSIS — I21.4 NON-STEMI (NON-ST ELEVATED MYOCARDIAL INFARCTION) (H): ICD-10-CM

## 2017-11-20 RX ORDER — NITROGLYCERIN 0.4 MG/1
0.4 TABLET SUBLINGUAL EVERY 5 MIN PRN
Qty: 25 TABLET | Refills: 1 | Status: SHIPPED | OUTPATIENT
Start: 2017-11-20 | End: 2021-01-22

## 2017-11-20 NOTE — TELEPHONE ENCOUNTER
Prescription approved per [facility] Refill Protocol.  Brunilda Mansfield RN

## 2017-11-20 NOTE — TELEPHONE ENCOUNTER
Message from MyChart:  Original authorizing provider: MD Jono Villagran would like a refill of the following medications:  nitroglycerin (NITROSTAT) 0.4 MG SL tablet [Tl Ortiz MD]    Preferred pharmacy: [facility] [address] [address]    Comment:

## 2018-01-10 ENCOUNTER — OFFICE VISIT (OUTPATIENT)
Dept: FAMILY MEDICINE | Facility: CLINIC | Age: 58
End: 2018-01-10
Payer: COMMERCIAL

## 2018-01-10 VITALS
OXYGEN SATURATION: 94 % | HEIGHT: 67 IN | RESPIRATION RATE: 12 BRPM | SYSTOLIC BLOOD PRESSURE: 137 MMHG | TEMPERATURE: 98.1 F | BODY MASS INDEX: 33.27 KG/M2 | HEART RATE: 80 BPM | DIASTOLIC BLOOD PRESSURE: 74 MMHG | WEIGHT: 212 LBS

## 2018-01-10 DIAGNOSIS — J01.90 ACUTE SINUSITIS WITH SYMPTOMS > 10 DAYS: ICD-10-CM

## 2018-01-10 DIAGNOSIS — F17.200 TOBACCO USE DISORDER: ICD-10-CM

## 2018-01-10 DIAGNOSIS — I10 BENIGN ESSENTIAL HYPERTENSION: Primary | ICD-10-CM

## 2018-01-10 PROCEDURE — 99213 OFFICE O/P EST LOW 20 MIN: CPT | Performed by: FAMILY MEDICINE

## 2018-01-10 RX ORDER — AMOXICILLIN 500 MG/1
500 CAPSULE ORAL 3 TIMES DAILY
Qty: 30 CAPSULE | Refills: 0 | Status: SHIPPED | OUTPATIENT
Start: 2018-01-10 | End: 2018-01-20

## 2018-01-10 ASSESSMENT — ENCOUNTER SYMPTOMS
SORE THROAT: 1
NAUSEA: 0
VOMITING: 0
SPUTUM PRODUCTION: 1
CONSTIPATION: 0
ABDOMINAL PAIN: 0
FEVER: 0
COUGH: 1
CHILLS: 1
DIARRHEA: 0
HEADACHES: 1
CARDIOVASCULAR NEGATIVE: 1

## 2018-01-10 NOTE — MR AVS SNAPSHOT
After Visit Summary   1/10/2018    Dago Dangelo    MRN: [id number]           Patient Information     Date Of Birth          1960        Visit Information        Provider Department      1/10/2018 2:20 PM Tl Ortiz MD [facility]        Today's Diagnoses     Benign essential hypertension    -  1    Tobacco use disorder        Acute sinusitis with symptoms > 10 days           Follow-ups after your visit        Follow-up notes from your care team     Return in about 2 weeks (around 1/24/2018), or if symptoms worsen or fail to improve.      Who to contact     If you have questions or need follow up information about today's clinic visit or your schedule please contact [facility] directly at [phone].  Normal or non-critical lab and imaging results will be communicated to you by MyChart, letter or phone within 4 business days after the clinic has received the results. If you do not hear from us within 7 days, please contact the clinic through [place] J&Bhart or phone. If you have a critical or abnormal lab result, we will notify you by phone as soon as possible.  Submit refill requests through PharmAssistant or call your pharmacy and they will forward the refill request to us. Please allow 3 business days for your refill to be completed.          Additional Information About Your Visit        MyChart Information     PharmAssistant gives you secure access to your electronic health record. If you see a primary care provider, you can also send messages to your care team and make appointments. If you have questions, please call your primary care clinic.  If you do not have a primary care provider, please call [phone] and they will assist you.        Care EveryWhere ID     This is your Care EveryWhere ID. This could be used by other organizations to access your [facility] [place] medical records  [id number]        Your Vitals Were     Pulse Temperature Respirations Height Pulse  "Oximetry BMI (Body Mass Index)    80 98.1  F (36.7  C) (Oral) 12 5' 6.5\" (1.689 m) 94% 33.71 kg/m2       Blood Pressure from Last 3 Encounters:   01/10/18 137/74   11/17/17 126/80   07/07/17 130/80    Weight from Last 3 Encounters:   01/10/18 212 lb (96.2 kg)   11/17/17 215 lb 11.2 oz (97.8 kg)   07/07/17 209 lb (94.8 kg)              Today, you had the following     No orders found for display         Today's Medication Changes          These changes are accurate as of: 1/10/18  2:56 PM.  If you have any questions, ask your nurse or doctor.               Start taking these medicines.        Dose/Directions    amoxicillin 500 MG capsule   Commonly known as:  AMOXIL   Used for:  Acute sinusitis with symptoms > 10 days   Started by:  Tl Ortiz MD        Dose:  500 mg   Take 1 capsule (500 mg) by mouth 3 times daily for 10 days   Quantity:  30 capsule   Refills:  0            Where to get your medicines      These medications were sent to [facility] [place], [place] - [address]  [address] [facility] [address]     Phone:  [phone]     amoxicillin 500 MG capsule                Primary Care Provider Office Phone # Fax #    Tl Ortiz [phone]       [address] [facility] [address]        Equal Access to Services     [facility]: Hadii rafita vicente hadasho Sonikhil, waaxda luqadaha, qaybta kaalmada adeegyada, dianna grissom. So [facility] [phone].    ATENCIÓN: Si habla español, tiene a alejandre disposición servicios gratuitos de asistencia lingüística. Llame al [phone].    We comply with applicable federal civil rights laws and [place] laws. We do not discriminate on the basis of race, color, national origin, age, disability, sex, sexual orientation, or gender identity.            Thank you!     Thank you for choosing [facility]  for your care. Our goal is always to provide you with excellent " care. Hearing back from our patients is one way we can continue to improve our services. Please take a few minutes to complete the written survey that you may receive in the mail after your visit with us. Thank you!             Your Updated Medication List - Protect others around you: Learn how to safely use, store and throw away your medicines at www.disposemymeds.org.          This list is accurate as of: 1/10/18  2:56 PM.  Always use your most recent med list.                   Brand Name Dispense Instructions for use Diagnosis    * ACCU-CHEK COMPLETE Kit     1 Box    1 Box 2 times daily Measure blood sugar 2x daily    Type 2 diabetes mellitus without complication (H)       * blood glucose monitoring meter device kit    no brand specified    1 kit    Use to test blood sugar 2 times daily or as directed. PT HAS NEW PLAN COVERAGE EFFECTIVE 9/1/16, COVERS CONTOUR PRODUCTS    Type 2 diabetes mellitus without complication (H)       amLODIPine 5 MG tablet    NORVASC    180 tablet    TAKE TWO TABLETS BY MOUTH EVERY DAY    Non-STEMI (non-ST elevated myocardial infarction) (H), Benign essential hypertension       amoxicillin 500 MG capsule    AMOXIL    30 capsule    Take 1 capsule (500 mg) by mouth 3 times daily for 10 days    Acute sinusitis with symptoms > 10 days       ASPIRIN LOW DOSE 81 MG EC tablet   Generic drug:  aspirin     100 tablet    TAKE ONE TABLET BY MOUTH EVERY DAY    Non-STEMI (non-ST elevated myocardial infarction) (H)       atorvastatin 40 MG tablet    LIPITOR    90 tablet    TAKE ONE TABLET BY MOUTH EVERY DAY AT BEDTIME    Non-STEMI (non-ST elevated myocardial infarction) (H)       blood glucose calibration solution    no brand specified    1 each    Use to calibrate blood glucose monitor as directed.    Type 2 diabetes mellitus without complication (H)       blood glucose lancets standard    no brand specified    100 each    Use to test blood sugar 2 times daily or as directed.    Type 2 diabetes  mellitus without complication, without long-term current use of insulin (H)       blood glucose monitoring test strip    no brand specified    3 Box    Use to test blood sugars 2 times daily or as directed-DISPENSE PER PT INSURANCE PER PT PREFERENCE, EFFECTIVE 9/1/16 PT HAS INSURANCE CHANGE TO [employer]    Type 2 diabetes mellitus without complication (H)       carvedilol 25 MG tablet    COREG    180 tablet    Take 1 tablet (25 mg) by mouth 2 times daily (with meals)    Non-STEMI (non-ST elevated myocardial infarction) (H)       losartan 100 MG tablet    COZAAR    90 tablet    TAKE ONE TABLET BY MOUTH EVERY DAY    Benign essential hypertension       metFORMIN 500 MG tablet    GLUCOPHAGE    180 tablet    Take 1 tablet (500 mg) by mouth 2 times daily (with meals)    Type 2 diabetes mellitus without complication, without long-term current use of insulin (H)       multivitamin, therapeutic with minerals Tabs tablet      Take 1 tablet by mouth daily        nitroGLYcerin 0.4 MG sublingual tablet    NITROSTAT    25 tablet    Place 1 tablet (0.4 mg) under the tongue every 5 minutes as needed for chest pain if you are still having symptoms after 3 doses (15 minutes) call 911.    Non-STEMI (non-ST elevated myocardial infarction) (H)       order for DME     1 Device    Blood pressure cuff    Benign essential hypertension       ticagrelor 90 MG tablet    BRILINTA    180 tablet    Take 1 tablet (90 mg) by mouth every 12 hours    Non-STEMI (non-ST elevated myocardial infarction) (H)       * Notice:  This list has 2 medication(s) that are the same as other medications prescribed for you. Read the directions carefully, and ask your doctor or other care provider to review them with you.

## 2018-01-10 NOTE — PROGRESS NOTES
HPI     SUBJECTIVE:   Dago Dangelo is a 57 year old male who presents to clinic today for the following health issues:      Acute Illness   Acute illness concerns: URI  Onset: Two weeks ago    Fever: YES- low grade on occasion    Chills/Sweats: YES    Headache (location?): YES- frontal    Sinus Pressure:YES    Conjunctivitis:  no    Ear Pain: no    Rhinorrhea: YES    Congestion: YES    Sore Throat: YES     Cough: YES-productive of clear sputum, productive of green sputum    Wheeze: YES    Decreased Appetite: YES    Nausea: no    Vomiting: no    Diarrhea:  YES    Dysuria/Freq.: no    Fatigue/Achiness: YES    Sick/Strep Exposure: YES     Therapies Tried and outcome: Musinex, coricidin,     Protracted crud - has missed several days of work this week.  Sleeping a lot, always tired.  Missed one other day of work on the first day.  Had been been getting buy, but feeling pretty consistently crummy since start two weeks ago.  Lots of congestion, cough.  Cough disturbs sleep.  OTCs not terribly helpful.        Review of Systems   Constitutional: Positive for chills and malaise/fatigue. Negative for fever.   HENT: Positive for congestion and sore throat.    Respiratory: Positive for cough and sputum production.    Cardiovascular: Negative.    Gastrointestinal: Negative for abdominal pain, constipation, diarrhea, nausea and vomiting.   Skin: Negative for rash.   Neurological: Positive for headaches.         Physical Exam   Constitutional: He is well-developed, well-nourished, and in no distress. No distress.   HENT:   Right Ear: Tympanic membrane, external ear and ear canal normal.   Left Ear: Tympanic membrane, external ear and ear canal normal.   Nose: Right sinus exhibits frontal sinus tenderness. Left sinus exhibits frontal sinus tenderness.   Mouth/Throat: Posterior oropharyngeal erythema present. No oropharyngeal exudate.   Eyes: Conjunctivae are normal.   Neck: Neck supple.   Cardiovascular: Normal rate, regular  rhythm and normal heart sounds.    Pulmonary/Chest: Effort normal and breath sounds normal.   Lymphadenopathy:     He has no cervical adenopathy.   Skin: Skin is warm and dry. No rash noted.   Nursing note and vitals reviewed.    (I10) Benign essential hypertension  (primary encounter diagnosis)  Comment:   Plan: OK on recheck    (F17.200) Tobacco use disorder  Comment:   Plan: advised to quit    (J01.90) Acute sinusitis with symptoms > 10 days  Comment: letter provided for work  Plan: amoxicillin (AMOXIL) 500 MG capsule              RTC in 2w prn    Tl Ortiz MD

## 2018-01-22 ENCOUNTER — OFFICE VISIT (OUTPATIENT)
Dept: FAMILY MEDICINE | Facility: CLINIC | Age: 58
End: 2018-01-22
Payer: COMMERCIAL

## 2018-01-22 VITALS
TEMPERATURE: 98.5 F | SYSTOLIC BLOOD PRESSURE: 144 MMHG | BODY MASS INDEX: 34.5 KG/M2 | HEART RATE: 72 BPM | RESPIRATION RATE: 16 BRPM | OXYGEN SATURATION: 96 % | DIASTOLIC BLOOD PRESSURE: 78 MMHG | WEIGHT: 217 LBS

## 2018-01-22 DIAGNOSIS — G51.0 BELL'S PALSY: Primary | ICD-10-CM

## 2018-01-22 PROCEDURE — 99214 OFFICE O/P EST MOD 30 MIN: CPT | Performed by: FAMILY MEDICINE

## 2018-01-22 RX ORDER — ACYCLOVIR 400 MG/1
400 TABLET ORAL 3 TIMES DAILY
Qty: 30 TABLET | Refills: 0 | Status: SHIPPED | OUTPATIENT
Start: 2018-01-22 | End: 2018-02-20

## 2018-01-22 RX ORDER — PREDNISONE 20 MG/1
TABLET ORAL
Qty: 20 TABLET | Refills: 0 | Status: SHIPPED | OUTPATIENT
Start: 2018-01-22 | End: 2018-02-09

## 2018-01-22 ASSESSMENT — ENCOUNTER SYMPTOMS
RESPIRATORY NEGATIVE: 1
FOCAL WEAKNESS: 1
LOSS OF CONSCIOUSNESS: 0
SEIZURES: 0
HEADACHES: 0
FEVER: 0
BLURRED VISION: 0
DOUBLE VISION: 0
SENSORY CHANGE: 1
CARDIOVASCULAR NEGATIVE: 1
SPEECH CHANGE: 1

## 2018-01-22 NOTE — PATIENT INSTRUCTIONS
Rosas s Palsy    Bell's Palsy is a problem involving the nerve that controls the muscles on one side of the face.  The cause is unknown, but may be related to inflammation of the nerve. Symptoms usually appear only on the affected side. They may include:    Inability to close the eyelid    Tearing of the eye    Facial drooping    Drooling    Numbness or pain    Changes in taste    Sound sensitivity  Damage to the eye can be a serious problem. The inability to blink can cause the eye to dry out. An ulcer (sore) can then form on the cornea. Also, not blinking means that the eye has no protection from dirt and dust particles.  Treatment involves protecting and moistening the eye. Medicines may also help.  Most persons recover completely within 3 to 6 months. However, the condition sometimes returns months or years later.  Home care    Get plenty of rest and eat a healthy diet to help yourself recover.    Use Artificial Tears frequently during the day and at bedtime to prevent drying. These drops are available without prescription at your drug store.    Wear protective glasses especially when outside to protect from flying debris. Use sunglasses when outdoors.    Tape the eyelid closed at bedtime with a paper tape (available at your pharmacy). This has a very mild adhesive to avoid injury to the lid. This will protect your eye from injury while you sleep.    Sometimes medicines are prescribed to reduce inflammation or treat specific viral infections of the nerve. If medicines are prescribed, take them exactly as directed. Usually there is a 10-day course of medication that is started as soon as possible. Taking this medicine as prescribed will help with a full recovery.    Use low heat, for example from a heating pad, on the affected area. This can help reduce pain and swelling.    If you are experiencing sever pain, contact your health care provider.  Follow-up care  Follow up with your healthcare provider as advised.  If you referred to a specialist, make that appointment promptly.  When to seek medical advice  Call your healthcare provider if any of the following occur:    Severe eye redness    Eye pain    Thick drainage from the eye    Change in vision (such as double vision or losing vision)    Fever over 100.4 F (38 C) or as directed by your healthcare provider    Headache, neck pain, weakness, trouble speaking or walking, or other unexplained symptoms  Date Last Reviewed: 8/23/2015 2000-2017 [employer]. [address] [place] [address]. All rights reserved. This information is not intended as a substitute for professional medical care. Always follow your healthcare professional's instructions.

## 2018-01-22 NOTE — NURSING NOTE
"Chief Complaint   Patient presents with     Facial Droop     right side of face       Initial /78  Pulse 72  Temp 98.5  F (36.9  C) (Oral)  Resp 16  Wt 217 lb (98.4 kg)  SpO2 96%  BMI 34.5 kg/m2 Estimated body mass index is 34.5 kg/(m^2) as calculated from the following:    Height as of 1/10/18: 5' 6.5\" (1.689 m).    Weight as of this encounter: 217 lb (98.4 kg).  Medication Reconciliation: complete   Yokasta Bhakta CMA (AAMA)      "

## 2018-01-22 NOTE — MR AVS SNAPSHOT
After Visit Summary   1/22/2018    Dago Dangelo    MRN: [id number]           Patient Information     Date Of Birth          1960        Visit Information        Provider Department      1/22/2018 8:40 AM Tl Ortiz MD [facility]        Today's Diagnoses     Bell's palsy    -  1      Care Instructions      Rosas s Palsy    Bell's Palsy is a problem involving the nerve that controls the muscles on one side of the face.  The cause is unknown, but may be related to inflammation of the nerve. Symptoms usually appear only on the affected side. They may include:    Inability to close the eyelid    Tearing of the eye    Facial drooping    Drooling    Numbness or pain    Changes in taste    Sound sensitivity  Damage to the eye can be a serious problem. The inability to blink can cause the eye to dry out. An ulcer (sore) can then form on the cornea. Also, not blinking means that the eye has no protection from dirt and dust particles.  Treatment involves protecting and moistening the eye. Medicines may also help.  Most persons recover completely within 3 to 6 months. However, the condition sometimes returns months or years later.  Home care    Get plenty of rest and eat a healthy diet to help yourself recover.    Use Artificial Tears frequently during the day and at bedtime to prevent drying. These drops are available without prescription at your drug store.    Wear protective glasses especially when outside to protect from flying debris. Use sunglasses when outdoors.    Tape the eyelid closed at bedtime with a paper tape (available at your pharmacy). This has a very mild adhesive to avoid injury to the lid. This will protect your eye from injury while you sleep.    Sometimes medicines are prescribed to reduce inflammation or treat specific viral infections of the nerve. If medicines are prescribed, take them exactly as directed. Usually there is a 10-day course of medication  that is started as soon as possible. Taking this medicine as prescribed will help with a full recovery.    Use low heat, for example from a heating pad, on the affected area. This can help reduce pain and swelling.    If you are experiencing sever pain, contact your health care provider.  Follow-up care  Follow up with your healthcare provider as advised. If you referred to a specialist, make that appointment promptly.  When to seek medical advice  Call your healthcare provider if any of the following occur:    Severe eye redness    Eye pain    Thick drainage from the eye    Change in vision (such as double vision or losing vision)    Fever over 100.4 F (38 C) or as directed by your healthcare provider    Headache, neck pain, weakness, trouble speaking or walking, or other unexplained symptoms  Date Last Reviewed: 8/23/2015 2000-2017 [employer] [address] [place], [place] [address]. All rights reserved. This information is not intended as a substitute for professional medical care. Always follow your healthcare professional's instructions.                Follow-ups after your visit        Follow-up notes from your care team     Return in about 2 weeks (around 2/5/2018).      Who to contact     If you have questions or need follow up information about today's clinic visit or your schedule please contact [facility] directly at [phone].  Normal or non-critical lab and imaging results will be communicated to you by MyChart, letter or phone within 4 business days after the clinic has received the results. If you do not hear from us within 7 days, please contact the clinic through WineDemonhart or phone. If you have a critical or abnormal lab result, we will notify you by phone as soon as possible.  Submit refill requests through R[employer]IV or call your pharmacy and they will forward the refill request to us. Please allow 3 business days for your refill to be completed.           Additional Information About Your Visit        CustomMadehart Information     [employer] gives you secure access to your electronic health record. If you see a primary care provider, you can also send messages to your care team and make appointments. If you have questions, please call your primary care clinic.  If you do not have a primary care provider, please call [phone] and they will assist you.        Care EveryWhere ID     This is your Care EveryWhere ID. This could be used by other organizations to access your [place] medical records  [id number]        Your Vitals Were     Pulse Temperature Respirations Pulse Oximetry BMI (Body Mass Index)       72 98.5  F (36.9  C) (Oral) 16 96% 34.5 kg/m2        Blood Pressure from Last 3 Encounters:   01/22/18 144/78   01/10/18 137/74   11/17/17 126/80    Weight from Last 3 Encounters:   01/22/18 217 lb (98.4 kg)   01/10/18 212 lb (96.2 kg)   11/17/17 215 lb 11.2 oz (97.8 kg)              Today, you had the following     No orders found for display         Today's Medication Changes          These changes are accurate as of: 1/22/18  9:09 AM.  If you have any questions, ask your nurse or doctor.               Start taking these medicines.        Dose/Directions    acyclovir 400 MG tablet   Commonly known as:  ZOVIRAX   Used for:  Bell's palsy   Started by:  Tl Ortiz MD        Dose:  400 mg   Take 1 tablet (400 mg) by mouth 3 times daily   Quantity:  30 tablet   Refills:  0       predniSONE 20 MG tablet   Commonly known as:  DELTASONE   Used for:  Bell's palsy   Started by:  Tl Ortiz MD        Take 3 tabs (60 mg) by mouth daily x 3 days, 2 tabs (40 mg) daily x 3 days, 1 tab (20 mg) daily x 3 days, then 1/2 tab (10 mg) x 3 days.   Quantity:  20 tablet   Refills:  0            Where to get your medicines      These medications were sent to [facility]  [place] - [address]  [address] [place] [address]      Phone:  [phone]     acyclovir 400 MG tablet    predniSONE 20 MG tablet                Primary Care Provider Office Phone # Fax #    Tl Ortiz [phone]       [id number]  [facility] [address]        Equal Access to Services     CRISTIAN LYNN : Hadii aad ku hadasho Soomaali, waaxda luqadaha, qaybta kaalmada adeegyada, waxay sandiin haybhavnan adeana naomy hood grissom. So [facility] [phone].    ATENCIÓN: Si habla español, tiene a alejandre disposición servicios gratuitos de asistencia lingüística. Llame al [phone].    We comply with applicable federal civil rights laws and [place] laws. We do not discriminate on the basis of race, color, national origin, age, disability, sex, sexual orientation, or gender identity.            Thank you!     Thank you for choosing [facility]  for your care. Our goal is always to provide you with excellent care. Hearing back from our patients is one way we can continue to improve our services. Please take a few minutes to complete the written survey that you may receive in the mail after your visit with us. Thank you!             Your Updated Medication List - Protect others around you: Learn how to safely use, store and throw away your medicines at www.disposemymeds.org.          This list is accurate as of: 1/22/18  9:09 AM.  Always use your most recent med list.                   Brand Name Dispense Instructions for use Diagnosis    * ACCU-CHEK COMPLETE Kit     1 Box    1 Box 2 times daily Measure blood sugar 2x daily    Type 2 diabetes mellitus without complication (H)       * blood glucose monitoring meter device kit    no brand specified    1 kit    Use to test blood sugar 2 times daily or as directed. PT HAS NEW PLAN COVERAGE EFFECTIVE 9/1/16, COVERS CONTOUR PRODUCTS    Type 2 diabetes mellitus without complication (H)       acyclovir 400 MG tablet    ZOVIRAX    30 tablet    Take 1 tablet (400 mg) by mouth 3 times daily    Bell's  palsy       amLODIPine 5 MG tablet    NORVASC    180 tablet    TAKE TWO TABLETS BY MOUTH EVERY DAY    Non-STEMI (non-ST elevated myocardial infarction) (H), Benign essential hypertension       ASPIRIN LOW DOSE 81 MG EC tablet   Generic drug:  aspirin     100 tablet    TAKE ONE TABLET BY MOUTH EVERY DAY    Non-STEMI (non-ST elevated myocardial infarction) (H)       atorvastatin 40 MG tablet    LIPITOR    90 tablet    TAKE ONE TABLET BY MOUTH EVERY DAY AT BEDTIME    Non-STEMI (non-ST elevated myocardial infarction) (H)       blood glucose calibration solution    no brand specified    1 each    Use to calibrate blood glucose monitor as directed.    Type 2 diabetes mellitus without complication (H)       blood glucose lancets standard    no brand specified    100 each    Use to test blood sugar 2 times daily or as directed.    Type 2 diabetes mellitus without complication, without long-term current use of insulin (H)       blood glucose monitoring test strip    no brand specified    3 Box    Use to test blood sugars 2 times daily or as directed-DISPENSE PER PT INSURANCE PER PT PREFERENCE, EFFECTIVE 9/1/16 PT HAS INSURANCE CHANGE TO [employer]    Type 2 diabetes mellitus without complication (H)       carvedilol 25 MG tablet    COREG    180 tablet    Take 1 tablet (25 mg) by mouth 2 times daily (with meals)    Non-STEMI (non-ST elevated myocardial infarction) (H)       losartan 100 MG tablet    COZAAR    90 tablet    TAKE ONE TABLET BY MOUTH EVERY DAY    Benign essential hypertension       metFORMIN 500 MG tablet    GLUCOPHAGE    180 tablet    Take 1 tablet (500 mg) by mouth 2 times daily (with meals)    Type 2 diabetes mellitus without complication, without long-term current use of insulin (H)       multivitamin, therapeutic with minerals Tabs tablet      Take 1 tablet by mouth daily        nitroGLYcerin 0.4 MG sublingual tablet    NITROSTAT    25 tablet    Place 1 tablet (0.4 mg) under the tongue every 5 minutes  as needed for chest pain if you are still having symptoms after 3 doses (15 minutes) call 911.    Non-STEMI (non-ST elevated myocardial infarction) (H)       order for DME     1 Device    Blood pressure cuff    Benign essential hypertension       predniSONE 20 MG tablet    DELTASONE    20 tablet    Take 3 tabs (60 mg) by mouth daily x 3 days, 2 tabs (40 mg) daily x 3 days, 1 tab (20 mg) daily x 3 days, then 1/2 tab (10 mg) x 3 days.    Bell's palsy       ticagrelor 90 MG tablet    BRILINTA    180 tablet    Take 1 tablet (90 mg) by mouth every 12 hours    Non-STEMI (non-ST elevated myocardial infarction) (H)       * Notice:  This list has 2 medication(s) that are the same as other medications prescribed for you. Read the directions carefully, and ask your doctor or other care provider to review them with you.

## 2018-01-22 NOTE — PROGRESS NOTES
"HPI    SUBJECTIVE:   Dago Dangelo is a 57 year old male who presents to clinic today for the following health issues:    Concern - Facial Droop  Onset: x3 days    Description:   Started with right side of mouth and has progressed to his cheek and eye.    Intensity: moderate    Progression of Symptoms:  worsening    Accompanying Signs & Symptoms:  Is unable to close his right eye; watering.    Previous history of similar problem:   None    Precipitating factors:   Worsened by: None    Alleviating factors:  Improved by: None    Therapies Tried and outcome: None    First noticed two nights ago when started loosing liquid when drinking.  Yesterday am eye became involved, noticing some issues this am with speech being slurred.  No difficulty swallowing, no coughing or choking.  No issues with word finding, confusion.  No numbness or weakness in arms or legs.  Mild numbness in cheek, \"feels like novacaine.\"  R eye is watering, but no blurriness or double vision.  No HA.    believes he had chicken pox as a child, has never had cold sores/herpes to his knowledge.  No rash.      Review of Systems   Constitutional: Negative for fever and malaise/fatigue.   Eyes: Negative for blurred vision and double vision.   Respiratory: Negative.    Cardiovascular: Negative.    Neurological: Positive for sensory change, speech change and focal weakness. Negative for seizures, loss of consciousness and headaches.         Physical Exam   Constitutional: He is oriented to person, place, and time and well-developed, well-nourished, and in no distress.   Eyes: Conjunctivae and EOM are normal. Left eye exhibits normal extraocular motion.   Cardiovascular: Normal rate, regular rhythm and normal heart sounds.    Pulmonary/Chest: Effort normal and breath sounds normal.   Musculoskeletal: He exhibits no edema.   Neurological: He is alert and oriented to person, place, and time. He displays facial asymmetry and abnormal speech. A cranial nerve " deficit is present. Gait normal.   Normal/symmetrical shrug, palate elevation, tongue protrusion.  Normal UE str.   Skin: Skin is warm and dry.   Vitals reviewed.    (G51.0) Bell's palsy  (primary encounter diagnosis)  Comment: pretty typical case, advised that if diffuclty swallowing, arm/shoulder/leg involvement, confusion, HA, vision changes shold go to ED to immediate eval  Plan: acyclovir (ZOVIRAX) 400 MG tablet, predniSONE         (DELTASONE) 20 MG tablet              RTC in 2w    Tl Ortiz MD

## 2018-02-09 ENCOUNTER — OFFICE VISIT (OUTPATIENT)
Dept: FAMILY MEDICINE | Facility: CLINIC | Age: 58
End: 2018-02-09
Payer: COMMERCIAL

## 2018-02-09 VITALS
OXYGEN SATURATION: 97 % | BODY MASS INDEX: 32.62 KG/M2 | RESPIRATION RATE: 18 BRPM | SYSTOLIC BLOOD PRESSURE: 142 MMHG | HEIGHT: 66 IN | HEART RATE: 72 BPM | TEMPERATURE: 98.3 F | WEIGHT: 203 LBS | DIASTOLIC BLOOD PRESSURE: 82 MMHG

## 2018-02-09 DIAGNOSIS — R07.0 THROAT PAIN: Primary | ICD-10-CM

## 2018-02-09 PROCEDURE — 99214 OFFICE O/P EST MOD 30 MIN: CPT | Performed by: FAMILY MEDICINE

## 2018-02-09 ASSESSMENT — ENCOUNTER SYMPTOMS
COUGH: 1
SORE THROAT: 1
FOCAL WEAKNESS: 0
FEVER: 0

## 2018-02-09 NOTE — MR AVS SNAPSHOT
"              After Visit Summary   2/9/2018    Dago Dangelo    MRN: [id number]           Patient Information     Date Of Birth          1960        Visit Information        Provider Department      2/9/2018 9:40 AM Tl Ortiz MD [facility]        Today's Diagnoses     Throat pain    -  1       Follow-ups after your visit        Follow-up notes from your care team     Return in about 2 weeks (around 2/23/2018).      Who to contact     If you have questions or need follow up information about today's clinic visit or your schedule please contact [facility] directly at [phone].  Normal or non-critical lab and imaging results will be communicated to you by QR Artisthart, letter or phone within 4 business days after the clinic has received the results. If you do not hear from us within 7 days, please contact the clinic through QR Artisthart or phone. If you have a critical or abnormal lab result, we will notify you by phone as soon as possible.  Submit refill requests through Wanderlust or call your pharmacy and they will forward the refill request to us. Please allow 3 business days for your refill to be completed.          Additional Information About Your Visit        MyChart Information     [employer] gives you secure access to your electronic health record. If you see a primary care provider, you can also send messages to your care team and make appointments. If you have questions, please call your primary care clinic.  If you do not have a primary care provider, please call [phone] and they will assist you.        Care EveryWhere ID     This is your Care EveryWhere ID. This could be used by other organizations to access your [facility] medical records  [id number]        Your Vitals Were     Pulse Temperature Respirations Height Pulse Oximetry BMI (Body Mass Index)    72 98.3  F (36.8  C) (Oral) 18 5' 6\" (1.676 m) 97% 32.77 kg/m2       Blood Pressure from Last 3 " Encounters:   02/09/18 142/82   01/22/18 144/78   01/10/18 137/74    Weight from Last 3 Encounters:   02/09/18 203 lb (92.1 kg)   01/22/18 217 lb (98.4 kg)   01/10/18 212 lb (96.2 kg)              Today, you had the following     No orders found for display       Primary Care Provider Office Phone # Fax #    Tl Jimmie Ortiz [phone]       [id number]  [facility] [address]        Equal Access to Services     [facility]: Hadii aad ku hadasho Soomaali, waaxda luqadaha, qaybta kaalmada adeegyada, waxay sandiin haybhavnan adeana morataya . So [facility] [phone].    ATENCIÓN: Si habla español, tiene a alejandre disposición servicios gratuitos de asistencia lingüística. [facility] [phone].    We comply with applicable federal civil rights laws and [place] laws. We do not discriminate on the basis of race, color, national origin, age, disability, sex, sexual orientation, or gender identity.            Thank you!     Thank you for choosing [facility]  for your care. Our goal is always to provide you with excellent care. Hearing back from our patients is one way we can continue to improve our services. Please take a few minutes to complete the written survey that you may receive in the mail after your visit with us. Thank you!             Your Updated Medication List - Protect others around you: Learn how to safely use, store and throw away your medicines at www.disposemymeds.org.          This list is accurate as of 2/9/18 10:52 AM.  Always use your most recent med list.                   Brand Name Dispense Instructions for use Diagnosis    * ACCU-CHEK COMPLETE Kit     1 Box    1 Box 2 times daily Measure blood sugar 2x daily    Type 2 diabetes mellitus without complication (H)       * blood glucose monitoring meter device kit    no brand specified    1 kit    Use to test blood sugar 2 times daily or as directed. PT HAS NEW PLAN COVERAGE EFFECTIVE 9/1/16, COVERS CONTOUR  PRODUCTS    Type 2 diabetes mellitus without complication (H)       acyclovir 400 MG tablet    ZOVIRAX    30 tablet    Take 1 tablet (400 mg) by mouth 3 times daily    Bell's palsy       amLODIPine 5 MG tablet    NORVASC    180 tablet    TAKE TWO TABLETS BY MOUTH EVERY DAY    Non-STEMI (non-ST elevated myocardial infarction) (H), Benign essential hypertension       ASPIRIN LOW DOSE 81 MG EC tablet   Generic drug:  aspirin     100 tablet    TAKE ONE TABLET BY MOUTH EVERY DAY    Non-STEMI (non-ST elevated myocardial infarction) (H)       atorvastatin 40 MG tablet    LIPITOR    90 tablet    TAKE ONE TABLET BY MOUTH EVERY DAY AT BEDTIME    Non-STEMI (non-ST elevated myocardial infarction) (H)       blood glucose calibration solution    no brand specified    1 each    Use to calibrate blood glucose monitor as directed.    Type 2 diabetes mellitus without complication (H)       blood glucose lancets standard    no brand specified    100 each    Use to test blood sugar 2 times daily or as directed.    Type 2 diabetes mellitus without complication, without long-term current use of insulin (H)       blood glucose monitoring test strip    no brand specified    3 Box    Use to test blood sugars 2 times daily or as directed-DISPENSE PER PT INSURANCE PER PT PREFERENCE, EFFECTIVE 9/1/16 PT HAS INSURANCE CHANGE TO [employer]    Type 2 diabetes mellitus without complication (H)       carvedilol 25 MG tablet    COREG    180 tablet    Take 1 tablet (25 mg) by mouth 2 times daily (with meals)    Non-STEMI (non-ST elevated myocardial infarction) (H)       losartan 100 MG tablet    COZAAR    90 tablet    TAKE ONE TABLET BY MOUTH EVERY DAY    Benign essential hypertension       metFORMIN 500 MG tablet    GLUCOPHAGE    180 tablet    Take 1 tablet (500 mg) by mouth 2 times daily (with meals)    Type 2 diabetes mellitus without complication, without long-term current use of insulin (H)       multivitamin, therapeutic with minerals  Tabs tablet      Take 1 tablet by mouth daily        nitroGLYcerin 0.4 MG sublingual tablet    NITROSTAT    25 tablet    Place 1 tablet (0.4 mg) under the tongue every 5 minutes as needed for chest pain if you are still having symptoms after 3 doses (15 minutes) call 911.    Non-STEMI (non-ST elevated myocardial infarction) (H)       order for DME     1 Device    Blood pressure cuff    Benign essential hypertension       ticagrelor 90 MG tablet    BRILINTA    180 tablet    Take 1 tablet (90 mg) by mouth every 12 hours    Non-STEMI (non-ST elevated myocardial infarction) (H)       * Notice:  This list has 2 medication(s) that are the same as other medications prescribed for you. Read the directions carefully, and ask your doctor or other care provider to review them with you.

## 2018-02-09 NOTE — PROGRESS NOTES
HPI    SUBJECTIVE:   Dago Dangelo is a 57 year old male who presents to clinic today for the following health issues:      Acute Illness   Acute illness concerns: Sore Throat, and Cough  Onset: Ongoing for a month intermittently     Fever: no    Chills/Sweats: no    Headache (location?): YES- Frontal    Sinus Pressure:no    Conjunctivitis:  no    Ear Pain: no    Rhinorrhea: YES    Congestion: YES    Sore Throat: YES     Cough: YES-productive    Wheeze: no    Decreased Appetite: no    Nausea: no    Vomiting: no    Diarrhea:  no    Dysuria/Freq.: no    Fatigue/Achiness: YES- Fatigued    Sick/Strep Exposure: no     Therapies Tried and outcome: Pt states he had tried a generic cough syrup which has seemed to help.     [place] palsy has fully resolved.  Was seen and treated for sinus infection 1/10/18.  This helped and sx resolved.  But notes has had 2-3 cycles of sx, but will feel well in between.  Does have some fatigue right now, but otherwise feels well.  No current throat pain or other URI sx.  Did get a flu shot this season.  No heartburn.  Denies dysphagia, globus sensation.  Episodically hoarse.      Review of Systems   Constitutional: Positive for malaise/fatigue. Negative for fever.   HENT: Positive for congestion and sore throat.    Respiratory: Positive for cough.    Neurological: Negative for focal weakness.         Physical Exam   Constitutional: He is well-developed, well-nourished, and in no distress. No distress.   HENT:   Right Ear: Tympanic membrane, external ear and ear canal normal.   Left Ear: Tympanic membrane, external ear and ear canal normal.   Mouth/Throat: Oropharynx is clear and moist. No oropharyngeal exudate.   Eyes: Conjunctivae are normal.   Neck: Neck supple.   Cardiovascular: Normal rate, regular rhythm and normal heart sounds.    Pulmonary/Chest: Effort normal and breath sounds normal.   Lymphadenopathy:     He has no cervical adenopathy.   Skin: Skin is warm and dry. No rash noted.    Nursing note and vitals reviewed.    (R07.0) Throat pain  (primary encounter diagnosis)  Comment: has multiple concerns - could be recurrent URIs, reflux, but is lifetime smoker and is concerns for lung/throat issues.  NO wheezing or shortness of breath  Plan: start with omeprazole 40mg daily for two weeks.  Refer to ENT if not improving.      RTC in 2w    Tl Ortiz MD

## 2018-02-12 NOTE — PROGRESS NOTES
Panel Management Review      Patient has the following on his problem list:     Diabetes    ASA: Passed    Last A1C  Lab Results   Component Value Date    A1C 6.7 07/07/2017    A1C 6.8 01/24/2017    A1C 6.5 09/21/2016    A1C 6.6 03/25/2016    A1C 6.6 12/18/2015     A1C tested: FAILED    Last LDL:    Lab Results   Component Value Date    CHOL 168 10/05/2017     Lab Results   Component Value Date    HDL 33 10/05/2017     Lab Results   Component Value Date    LDL  10/05/2017     Cannot estimate LDL when triglyceride exceeds 400 mg/dL    LDL 68 10/05/2017     Lab Results   Component Value Date    TRIG 574 10/05/2017     No results found for: CHOLHDLRATIO  Lab Results   Component Value Date    NHDL 135 10/05/2017       Is the patient on a Statin? YES             Is the patient on Aspirin? YES    Medications     HMG CoA Reductase Inhibitors    atorvastatin (LIPITOR) 40 MG tablet    Salicylates    ASPIRIN LOW DOSE 81 MG EC tablet          Last three blood pressure readings:  BP Readings from Last 3 Encounters:   02/09/18 142/82   01/22/18 144/78   01/10/18 137/74       Date of last diabetes office visit: 7/7/17     Tobacco History:     History   Smoking Status     Current Every Day Smoker     Packs/day: 1.00     Years: 30.00     Types: Cigarettes   Smokeless Tobacco     Never Used     Comment: may have one once weekly         Hypertension   Last three blood pressure readings:  BP Readings from Last 3 Encounters:   02/09/18 142/82   01/22/18 144/78   01/10/18 137/74     Blood pressure: FAILED    HTN Guidelines:  Age 18-59 BP range:  Less than 140/90  Age 60-85 with Diabetes:  Less than 140/90  Age 60-85 without Diabetes:  less than 150/90      Composite cancer screening  Chart review shows that this patient is due/due soon for the following None  Summary:    Patient is due/failing the following:   A1C and BP CHECK    Action needed:   Patient needs office visit for diabetes follow up and labs.    Type of outreach:    Sent  MyChart message.    Questions for provider review:    None                                                                                                                                    Yokasta Bhakta CMA ([facility])

## 2018-02-19 ENCOUNTER — APPOINTMENT (OUTPATIENT)
Dept: GENERAL RADIOLOGY | Facility: CLINIC | Age: 58
End: 2018-02-19
Attending: EMERGENCY MEDICINE
Payer: COMMERCIAL

## 2018-02-19 ENCOUNTER — HOSPITAL ENCOUNTER (EMERGENCY)
Facility: CLINIC | Age: 58
Discharge: HOME | End: 2018-02-19
Attending: EMERGENCY MEDICINE | Admitting: EMERGENCY MEDICINE
Payer: COMMERCIAL

## 2018-02-19 VITALS
SYSTOLIC BLOOD PRESSURE: 152 MMHG | RESPIRATION RATE: 22 BRPM | DIASTOLIC BLOOD PRESSURE: 77 MMHG | TEMPERATURE: 99.1 F | OXYGEN SATURATION: 96 %

## 2018-02-19 DIAGNOSIS — R07.9 CHEST PAIN, UNSPECIFIED TYPE: ICD-10-CM

## 2018-02-19 DIAGNOSIS — I25.110 CORONARY ARTERY DISEASE INVOLVING NATIVE CORONARY ARTERY OF NATIVE HEART WITH UNSTABLE ANGINA PECTORIS (H): ICD-10-CM

## 2018-02-19 LAB
ANION GAP SERPL CALCULATED.3IONS-SCNC: 6 MMOL/L (ref 3–14)
BASOPHILS # BLD AUTO: 0 10E9/L (ref 0–0.2)
BASOPHILS NFR BLD AUTO: 0.5 %
BUN SERPL-MCNC: 15 MG/DL (ref 7–30)
CALCIUM SERPL-MCNC: 9.1 MG/DL (ref 8.5–10.1)
CHLORIDE SERPL-SCNC: 105 MMOL/L (ref 94–109)
CO2 SERPL-SCNC: 26 MMOL/L (ref 20–32)
CREAT SERPL-MCNC: 0.89 MG/DL (ref 0.66–1.25)
D DIMER PPP FEU-MCNC: 0.4 UG/ML FEU (ref 0–0.5)
DIFFERENTIAL METHOD BLD: ABNORMAL
EOSINOPHIL # BLD AUTO: 0.2 10E9/L (ref 0–0.7)
EOSINOPHIL NFR BLD AUTO: 2.2 %
ERYTHROCYTE [DISTWIDTH] IN BLOOD BY AUTOMATED COUNT: 13.2 % (ref 10–15)
GFR SERPL CREATININE-BSD FRML MDRD: 88 ML/MIN/1.7M2
GLUCOSE SERPL-MCNC: 179 MG/DL (ref 70–99)
HCT VFR BLD AUTO: 41.2 % (ref 40–53)
HGB BLD-MCNC: 13.7 G/DL (ref 13.3–17.7)
IMM GRANULOCYTES # BLD: 0.1 10E9/L (ref 0–0.4)
IMM GRANULOCYTES NFR BLD: 0.6 %
INTERPRETATION ECG - MUSE: NORMAL
INTERPRETATION ECG - MUSE: NORMAL
LYMPHOCYTES # BLD AUTO: 2 10E9/L (ref 0.8–5.3)
LYMPHOCYTES NFR BLD AUTO: 23.2 %
MCH RBC QN AUTO: 31.4 PG (ref 26.5–33)
MCHC RBC AUTO-ENTMCNC: 33.3 G/DL (ref 31.5–36.5)
MCV RBC AUTO: 94 FL (ref 78–100)
MONOCYTES # BLD AUTO: 0.7 10E9/L (ref 0–1.3)
MONOCYTES NFR BLD AUTO: 7.9 %
NEUTROPHILS # BLD AUTO: 5.6 10E9/L (ref 1.6–8.3)
NEUTROPHILS NFR BLD AUTO: 65.6 %
NRBC # BLD AUTO: 0 10*3/UL
NRBC BLD AUTO-RTO: 0 /100
NT-PROBNP SERPL-MCNC: 20 PG/ML (ref 0–900)
PLATELET # BLD AUTO: 307 10E9/L (ref 150–450)
POTASSIUM SERPL-SCNC: 4 MMOL/L (ref 3.4–5.3)
RBC # BLD AUTO: 4.37 10E12/L (ref 4.4–5.9)
SODIUM SERPL-SCNC: 137 MMOL/L (ref 133–144)
TROPONIN I BLD-MCNC: 0 UG/L (ref 0–0.1)
TROPONIN I BLD-MCNC: 0 UG/L (ref 0–0.1)
TROPONIN I SERPL-MCNC: <0.015 UG/L (ref 0–0.04)
WBC # BLD AUTO: 8.6 10E9/L (ref 4–11)

## 2018-02-19 PROCEDURE — 93005 ELECTROCARDIOGRAM TRACING: CPT | Mod: 76

## 2018-02-19 PROCEDURE — 83880 ASSAY OF NATRIURETIC PEPTIDE: CPT | Performed by: EMERGENCY MEDICINE

## 2018-02-19 PROCEDURE — 96360 HYDRATION IV INFUSION INIT: CPT

## 2018-02-19 PROCEDURE — 80048 BASIC METABOLIC PNL TOTAL CA: CPT | Performed by: EMERGENCY MEDICINE

## 2018-02-19 PROCEDURE — 84484 ASSAY OF TROPONIN QUANT: CPT | Performed by: EMERGENCY MEDICINE

## 2018-02-19 PROCEDURE — 96361 HYDRATE IV INFUSION ADD-ON: CPT

## 2018-02-19 PROCEDURE — 93005 ELECTROCARDIOGRAM TRACING: CPT

## 2018-02-19 PROCEDURE — 25000128 H RX IP 250 OP 636: Performed by: EMERGENCY MEDICINE

## 2018-02-19 PROCEDURE — 84484 ASSAY OF TROPONIN QUANT: CPT

## 2018-02-19 PROCEDURE — 85379 FIBRIN DEGRADATION QUANT: CPT | Performed by: EMERGENCY MEDICINE

## 2018-02-19 PROCEDURE — 99285 EMERGENCY DEPT VISIT HI MDM: CPT | Mod: 25

## 2018-02-19 PROCEDURE — 71046 X-RAY EXAM CHEST 2 VIEWS: CPT

## 2018-02-19 PROCEDURE — 85025 COMPLETE CBC W/AUTO DIFF WBC: CPT | Performed by: EMERGENCY MEDICINE

## 2018-02-19 RX ORDER — SODIUM CHLORIDE 9 MG/ML
1000 INJECTION, SOLUTION INTRAVENOUS CONTINUOUS
Status: DISCONTINUED | OUTPATIENT
Start: 2018-02-19 | End: 2018-02-19 | Stop reason: HOSPADM

## 2018-02-19 RX ADMIN — SODIUM CHLORIDE 1000 ML: 9 INJECTION, SOLUTION INTRAVENOUS at 12:19

## 2018-02-19 ASSESSMENT — ENCOUNTER SYMPTOMS
SHORTNESS OF BREATH: 0
CHEST TIGHTNESS: 1

## 2018-02-19 NOTE — ED NOTES
"Chest tightness. History of MI in 2015. Two stents were placed at that time. This feels much the same to this patient. Pain started around 0615. Has taken two nitro. States if that helped at all \"it was just minimal\". States not aware of being short of breath.   "

## 2018-02-19 NOTE — ED PROVIDER NOTES
History     Chief Complaint:  Chest Pain    The history is provided by the patient.      Dago Dangelo is a 57 year old male who presents with chest pain. Patient reports onset of chest pain/tightness at 0615 this morning similar to when he required stenting. It was rated at 5-6/10 in severity at that time. He took 2x Nitro with only minimal relief of symptoms. Given persistence of symptoms patient presents to the emergency department. Currently rates pain at 4-5/10 in severity described as a pressure in right chest. The location is different from his past NSTEMI. No pain with respiration or change in position. He does note experiencing some pain previously when exerting himself that resolves after relaxing a bit. He denies shortness of breath, leg pain/swelling, or other complaint. Patient did take aspirin this morning with regular medications.     PE/DVT RISK FACTORS:  Sex:    M  Hormones:   Neg  Tobacco:   1.00 PPD smoker of 30 years  Surgery:   Neg  Other immobilization: Neg  Personal history:  Neg  Family history:  Neg     Allergies:  No known drug allergies     Medications:    Zovirax  Metformin  Lipitor  Losartan  Amlodipine   Coreg  Brilinta   MVI   Aspirin    Past Medical History:    Arthritis  CAD  Diabetes mellitus type 2  HTN  HLD  NSTEMI  Tobacco dependence   Diverticulosis     Past Surgical History:    Coronary stent    Family History:    CAD  Cerebrovascular disease  Diabetes  HTN  HLD  Cancer    Social History:  Presents with son   Tobacco use: 1.00 PPD smoker of 30 years  Alcohol use: Rarely  PCP: Tl Ortiz    Marital Status:  Single    Review of Systems   Respiratory: Positive for chest tightness. Negative for shortness of breath.    Cardiovascular: Positive for chest pain.   All other systems reviewed and are negative.    Physical Exam     Patient Vitals for the past 24 hrs:   BP Temp Temp src Heart Rate Resp SpO2   02/19/18 1419 - 99.1  F (37.3  C) Oral - - -   02/19/18 1400  122/74 - - 68 17 93 %   02/19/18 1345 124/76 - - 71 20 95 %   02/19/18 1330 135/73 - - 71 17 94 %   02/19/18 1300 130/74 - - 72 13 95 %   02/19/18 1245 132/77 - - 71 10 96 %   02/19/18 1230 134/82 - - 75 12 95 %   02/19/18 1215 136/82 - - 78 19 (!) 87 %   02/19/18 1202 140/84 - - 74 17 95 %   02/19/18 1155 167/87 - - 81 16 99 %      Physical Exam   Constitutional: He is oriented to person, place, and time. He appears well-developed.   HENT:   Head: Normocephalic and atraumatic.   Right Ear: External ear normal.   Mouth/Throat: Oropharynx is clear and moist.   Eyes: Conjunctivae and EOM are normal. Pupils are equal, round, and reactive to light.   Neck: Normal range of motion. Neck supple. No JVD present.   Cardiovascular: Normal rate, regular rhythm and normal heart sounds.    Pulmonary/Chest: Effort normal and breath sounds normal.   Abdominal: Soft. Bowel sounds are normal. He exhibits no distension. There is no tenderness. There is no rebound.   Musculoskeletal: Normal range of motion.   Lymphadenopathy:     He has no cervical adenopathy.   Neurological: He is alert and oriented to person, place, and time. He displays normal reflexes. No cranial nerve deficit. He exhibits normal muscle tone. Coordination normal.   Skin: Skin is warm and dry.   Psychiatric: He has a normal mood and affect. His behavior is normal. Judgment normal.   Nursing note and vitals reviewed.      Emergency Department Course   ECG (11:54:58):  Rate 81 bpm. DC interval 162. QRS duration 90. QT/QTc 372/432. P-R-T axes 51 52 70. Normal sinus rhythm. Normal ECG. Interpreted at 1158 by Jesse Ingram MD.     ECG (12:56:51):  Rate 69 bpm. DC interval 160. QRS duration 90. QT/QTc 388/415. P-R-T axes 51 49 72. Normal sinus rhythm. Normal ECG. Interpreted by Jesse Ingram MD.     Imaging:  Radiographic findings were communicated with the patient and family who voiced understanding of the findings.    XR Chest, 2 views:  IMPRESSION:  Negative.     Imaging independently reviewed and agree with radiologist interpretation.     Laboratory:  CBC: WNL (WBC 8.6, HGB 13.7, )   BMP: Glucose 179 (H) ow WNL (Creatinine 0.89)   1200: Troponin: <0.015    1206: Troponin POCT: 0.00   1432: Troponin POCT: 0.00   D-dimer: 0.4   BNP: 20    Interventions:  1219: NS 1L IV Bolus       Emergency Department Course:  Past medical records, nursing notes, and vitals reviewed.  1153: I performed an exam of the patient and obtained history, as documented above.  IV inserted and blood drawn.   Above interventions provided.   The patient was sent for a XR while in the emergency department, findings above.   I rechecked the patient. Explained findings to patient.   I personally reviewed the laboratory results with the Patient and son and answered all related questions prior to discharge.    1450: I rechecked the patient. Findings and plan explained to the Patient. Patient discharged home with instructions regarding supportive care, medications, and reasons to return. The importance of close follow-up was reviewed.      Impression & Plan    Medical Decision Making:  Patient presents with chest pain.  Patient has a history of coronary artery disease with MI in 2015.  Patient has had no follow-up with cardiology at this point.  And continues to smoke.  Patient EKG present hesitation is normal.  Troponins ×2 were negative.  Patient describes this is similar to his coronary artery pain however his examination is normal and the pain is somewhat right-sided.  In review of his prior cardiac presentation his description was not similar as he described radiation to both arms.  Patient denies this to clearly be exertion although he has had occasional pains when he works and cuts wood.  Patient was discussed the inability of the emergency room to rule out cannot completely coronary artery pain.  His EKGs are unchanged and troponins are negative ×2.  Patient was discussed admission  versus discharge at this point I think it is stable to discharge the patient having follow-up with a stress test as of this was likely be related recommended by cardiology.  Patient is encouraged to follow-up with primary cardiology for reassessment and to return with constant unrelenting chest pain or new symptoms.  Patient was encouraged to consider follow with primary care to look to medications or resources to stop smoking.  Recently patient is continued on Brilinta 2 years after stent placement.  I am not sure if this is indicated but will continue this until follow-up with cardiology.    Diagnosis:    ICD-10-CM   1. Chest pain, unspecified type R07.9   2. Coronary artery disease involving native coronary artery of native heart with unstable angina pectoris (H) I25.110       Disposition:  Discharged to home with plan as outlined.      I, Efe Olivas, am serving as a scribe at 11:53 AM on 2/19/2018 to document services personally performed by Jesse Ingram MD based on my observations and the provider's statements to me.    2/19/2018   [facility] EMERGENCY DEPARTMENT       Jesse Ingram MD  02/20/18 [id number]

## 2018-02-19 NOTE — ED NOTES
HPI:  Unaffected by inspiration.  Unafftected by position.  Unaffected by palpation /pressure.  No associated symptoms: such as  nausea vomiting, resp distress, light-headedness, dizziness , tingling  Has had simalar past events  Self treated with: ntg sl x 2  No  history trauma

## 2018-02-19 NOTE — ED AVS SNAPSHOT
[facility] [place] [facility]    [address]    [facility] [address]    Phone:  [phone]    Fax:  [phone]                                       Dago Dangelo   MRN: [id number]    Department:  [facility] [place] [facility]   Date of Visit:  2/19/2018           After Visit Summary Signature Page     I have received my discharge instructions, and my questions have been answered. I have discussed any challenges I see with this plan with the nurse or doctor.    ..........................................................................................................................................  Patient/Patient Representative Signature      ..........................................................................................................................................  Patient Representative Print Name and Relationship to Patient    ..................................................               ................................................  Date                                            Time    ..........................................................................................................................................  Reviewed by Signature/Title    ...................................................              ..............................................  Date                                                            Time

## 2018-02-19 NOTE — ED NOTES
It was confirmed that pt has not taken sildenafil (VIAGRA/REVATIO) or avanafil (STENDRA) within the last 8 hours, vardenafil (LEVITRA/STAXYN) within the last 18 hours or tadalafil (CIALIS/ADCIRCA) within the last 36 hours.

## 2018-02-20 ENCOUNTER — OFFICE VISIT (OUTPATIENT)
Dept: FAMILY MEDICINE | Facility: CLINIC | Age: 58
End: 2018-02-20
Payer: COMMERCIAL

## 2018-02-20 VITALS
DIASTOLIC BLOOD PRESSURE: 76 MMHG | HEART RATE: 76 BPM | SYSTOLIC BLOOD PRESSURE: 130 MMHG | WEIGHT: 210.8 LBS | RESPIRATION RATE: 16 BRPM | BODY MASS INDEX: 34.02 KG/M2 | TEMPERATURE: 98.4 F

## 2018-02-20 DIAGNOSIS — F17.200 TOBACCO USE DISORDER: ICD-10-CM

## 2018-02-20 DIAGNOSIS — E11.9 TYPE 2 DIABETES MELLITUS WITHOUT COMPLICATION, WITHOUT LONG-TERM CURRENT USE OF INSULIN (H): Primary | ICD-10-CM

## 2018-02-20 DIAGNOSIS — Z13.89 SCREENING FOR DIABETIC PERIPHERAL NEUROPATHY: ICD-10-CM

## 2018-02-20 DIAGNOSIS — E78.1 HYPERTRIGLYCERIDEMIA: ICD-10-CM

## 2018-02-20 DIAGNOSIS — I21.4 NON-STEMI (NON-ST ELEVATED MYOCARDIAL INFARCTION) (H): ICD-10-CM

## 2018-02-20 LAB
CREAT UR-MCNC: 106 MG/DL
HBA1C MFR BLD: 7.6 % (ref 4.3–6)
MICROALBUMIN UR-MCNC: 69 MG/L
MICROALBUMIN/CREAT UR: 65.09 MG/G CR (ref 0–17)
TSH SERPL DL<=0.005 MIU/L-ACNC: 0.69 MU/L (ref 0.4–4)

## 2018-02-20 PROCEDURE — 82043 UR ALBUMIN QUANTITATIVE: CPT | Performed by: FAMILY MEDICINE

## 2018-02-20 PROCEDURE — 99207 C FOOT EXAM  NO CHARGE: CPT | Performed by: FAMILY MEDICINE

## 2018-02-20 PROCEDURE — 84443 ASSAY THYROID STIM HORMONE: CPT | Performed by: FAMILY MEDICINE

## 2018-02-20 PROCEDURE — 83036 HEMOGLOBIN GLYCOSYLATED A1C: CPT | Performed by: FAMILY MEDICINE

## 2018-02-20 PROCEDURE — 36415 COLL VENOUS BLD VENIPUNCTURE: CPT | Performed by: FAMILY MEDICINE

## 2018-02-20 PROCEDURE — 99214 OFFICE O/P EST MOD 30 MIN: CPT | Performed by: FAMILY MEDICINE

## 2018-02-20 RX ORDER — GEMFIBROZIL 600 MG/1
600 TABLET, FILM COATED ORAL 2 TIMES DAILY
Qty: 60 TABLET | Refills: 1 | Status: SHIPPED | OUTPATIENT
Start: 2018-02-20 | End: 2018-04-21

## 2018-02-20 ASSESSMENT — ENCOUNTER SYMPTOMS
DOUBLE VISION: 0
BLURRED VISION: 0
FOCAL WEAKNESS: 0
CARDIOVASCULAR NEGATIVE: 1
TINGLING: 0
CONSTITUTIONAL NEGATIVE: 1
RESPIRATORY NEGATIVE: 1
SENSORY CHANGE: 0

## 2018-02-20 NOTE — PROGRESS NOTES
History of Present Illness     Diabetes:     Frequency of checking blood sugars::  Rarely    Diabetic concerns::  None    Hypoglycemia symptoms::  None    Paraesthesia present::  No    Eye Exam in the last year::  Yes    may 8 [id number]    Vascular Disease:     Symptoms::  None    Worsened or new symptoms since last visit::  YES    Nitroglycerin use::  Occasionally    Daily ASA::  Yes    Diet:  Low salt and Low fat/cholesterol  Frequency of exercise:  None  Taking medications regularly:  Yes  Medication side effects:  None  Additional concerns today:  No      BP Readings from Last 2 Encounters:   02/19/18 152/77   02/09/18 142/82     Hemoglobin A1C (%)   Date Value   07/07/2017 6.7 (H)   01/24/2017 6.8 (H)     LDL Cholesterol Calculated (mg/dL)   Date Value   10/05/2017     Cannot estimate LDL when triglyceride exceeds 400 mg/dL   07/07/2017     Cannot estimate LDL when triglyceride exceeds 400 mg/dL     LDL Cholesterol Direct (mg/dL)   Date Value   10/05/2017 68   07/07/2017 68       Answers for HPI/ROS submitted by the patient on 2/20/2018   PHQ-2 Score: 0          ED/UC Followup:    Facility:  [facility]  Date of visit: 2/19/18  Reason for visit: Chest Pains  Current Status: feeling better     Chest pain has fully resolved, nothing since yesterday.  Some brief pains with position change in bed in later yesterday, but nothing since.    Denies numbness or tingling in feet, vision changes, HA, palpitations.  Eye exam 4/17.    Admits he needs to quit smoking, is not ready.    Review of Systems   Constitutional: Negative.    Eyes: Negative for blurred vision and double vision.   Respiratory: Negative.    Cardiovascular: Negative.    Neurological: Negative for tingling, sensory change and focal weakness.         Physical Exam   Constitutional: He is oriented to person, place, and time and well-developed, well-nourished, and in no distress.   Eyes: Conjunctivae and EOM are normal.   Cardiovascular: Normal  rate, regular rhythm and normal heart sounds.    Pulses:       Dorsalis pedis pulses are 2+ on the right side, and 2+ on the left side.   Pulmonary/Chest: Effort normal and breath sounds normal.   Musculoskeletal: He exhibits no edema.   Neurological: He is alert and oriented to person, place, and time. He displays no weakness. Gait normal.   Reflex Scores:       Patellar reflexes are 2+ on the right side and 2+ on the left side.       Achilles reflexes are 2+ on the right side and 2+ on the left side.  Nl filament and proprioception in feet JOSIE   Skin: Skin is warm and dry.   Skin on feet healthy.  No wounds, callous, onychomycosis.   Vitals reviewed.    (E11.9) Type 2 diabetes mellitus without complication, without long-term current use of insulin (H)  (primary encounter diagnosis)  Comment: A1c up slightly, had been on half dose o metfomrin, will increase to 1000mg BID  Plan: HEMOGLOBIN A1C, Albumin Random Urine         Quantitative with Creat Ratio, TSH WITH FREE T4        REFLEX            (Z13.89) Screening for diabetic peripheral neuropathy  Comment:   Plan: FOOT EXAM  NO CHARGE [57528.114]            (I21.4) Non-STEMI (non-ST elevated myocardial infarction) (H)  Comment: refill  Plan: aspirin (ASPIRIN LOW DOSE) 81 MG EC tablet            (F17.200) Tobacco use disorder  Comment:   Plan:     (E78.1) Hypertriglyceridemia  Comment: LDL is OK< but trigs remain qute high,  Will trial adding gemfibrozil, to d/c if having muscle pain or abd pain.  Will check liver in 1m, recheck lipids in 3m  Plan: gemfibrozil (LOPID) 600 MG tablet              RTC in 1m    Tl Ortiz MD

## 2018-02-20 NOTE — MR AVS SNAPSHOT
After Visit Summary   2/20/2018    Dago Dangelo    MRN: [id number]           Patient Information     Date Of Birth          1960        Visit Information        Provider Department      2/20/2018 11:20 AM Tl Ortiz MD [facility]        Today's Diagnoses     Type 2 diabetes mellitus without complication, without long-term current use of insulin (H)    -  1    Screening for diabetic peripheral neuropathy        Non-STEMI (non-ST elevated myocardial infarction) (H)        Tobacco use disorder        Hypertriglyceridemia           Follow-ups after your visit        Follow-up notes from your care team     Return in about 1 month (around 3/20/2018).      Future tests that were ordered for you today     Open Future Orders        Priority Expected Expires Ordered    NM Adenosine stress test Routine  2/19/2019 2/19/2018            Who to contact     If you have questions or need follow up information about today's clinic visit or your schedule please contact [facility] directly at [phone].  Normal or non-critical lab and imaging results will be communicated to you by [employer], letter or phone within 4 business days after the clinic has received the results. If you do not hear from us within 7 days, please contact the clinic through [employer] or phone. If you have a critical or abnormal lab result, we will notify you by phone as soon as possible.  Submit refill requests through [employer] or call your pharmacy and they will forward the refill request to us. Please allow 3 business days for your refill to be completed.          Additional Information About Your Visit        MyChart Information     [employer] gives you secure access to your electronic health record. If you see a primary care provider, you can also send messages to your care team and make appointments. If you have questions, please call your primary care clinic.  If you do not have a primary care  provider, please call [phone] and they will assist you.        Care EveryWhere ID     This is your Care EveryWhere ID. This could be used by other organizations to access your [place] medical records  [id number]        Your Vitals Were     Pulse Temperature Respirations BMI (Body Mass Index)          76 98.4  F (36.9  C) (Oral) 16 34.02 kg/m2         Blood Pressure from Last 3 Encounters:   02/20/18 130/76   02/19/18 152/77   02/09/18 142/82    Weight from Last 3 Encounters:   02/20/18 210 lb 12.8 oz (95.6 kg)   02/09/18 203 lb (92.1 kg)   01/22/18 217 lb (98.4 kg)              We Performed the Following     Albumin Random Urine Quantitative with Creat Ratio     FOOT EXAM  NO CHARGE [78294.114]     HEMOGLOBIN A1C     TSH WITH FREE T4 REFLEX          Today's Medication Changes          These changes are accurate as of 2/20/18 12:17 PM.  If you have any questions, ask your nurse or doctor.               Start taking these medicines.        Dose/Directions    gemfibrozil 600 MG tablet   Commonly known as:  LOPID   Used for:  Hypertriglyceridemia   Started by:  Tl Ortiz MD        Dose:  600 mg   Take 1 tablet (600 mg) by mouth 2 times daily   Quantity:  60 tablet   Refills:  1         These medicines have changed or have updated prescriptions.        Dose/Directions    aspirin 81 MG EC tablet   Commonly known as:  ASPIRIN LOW DOSE   This may have changed:  See the new instructions.   Used for:  Non-STEMI (non-ST elevated myocardial infarction) (H)   Changed by:  Tl Ortiz MD        Dose:  81 mg   Take 1 tablet (81 mg) by mouth daily   Quantity:  100 tablet   Refills:  2            Where to get your medicines      These medications were sent to [facility] - [address]  [address] [address] [place] [address]     Phone:  [phone]     aspirin 81 MG EC tablet    gemfibrozil 600 MG tablet                Primary Care Provider Office Phone # Fax #     Tl Ortiz [phone]       [address]  [facility] [address]        Equal Access to Services     CRISTIAN LYNN : Joselo rafita vicente cheikh Weber, wamichaelda luqraya, devynta kamariada jason, dianna moralez laJazminnelda grissom. So [facility] [phone].    ATENCIÓN: Si habla español, tiene a alejandre disposición servicios gratuitos de asistencia lingüística. Llame al [phone].    We comply with applicable federal civil rights laws and [place] laws. We do not discriminate on the basis of race, color, national origin, age, disability, sex, sexual orientation, or gender identity.            Thank you!     Thank you for choosing [facility]  for your care. Our goal is always to provide you with excellent care. Hearing back from our patients is one way we can continue to improve our services. Please take a few minutes to complete the written survey that you may receive in the mail after your visit with us. Thank you!             Your Updated Medication List - Protect others around you: Learn how to safely use, store and throw away your medicines at www.disposemymeds.org.          This list is accurate as of 2/20/18 12:17 PM.  Always use your most recent med list.                   Brand Name Dispense Instructions for use Diagnosis    * ACCU-CHEK COMPLETE Kit     1 Box    1 Box 2 times daily Measure blood sugar 2x daily    Type 2 diabetes mellitus without complication (H)       * blood glucose monitoring meter device kit    no brand specified    1 kit    Use to test blood sugar 2 times daily or as directed. PT HAS NEW PLAN COVERAGE EFFECTIVE 9/1/16, COVERS [employer]    Type 2 diabetes mellitus without complication (H)       amLODIPine 5 MG tablet    NORVASC    180 tablet    TAKE TWO TABLETS BY MOUTH EVERY DAY    Non-STEMI (non-ST elevated myocardial infarction) (H), Benign essential hypertension       aspirin 81 MG EC tablet    ASPIRIN LOW DOSE    100 tablet     Take 1 tablet (81 mg) by mouth daily    Non-STEMI (non-ST elevated myocardial infarction) (H)       atorvastatin 40 MG tablet    LIPITOR    90 tablet    TAKE ONE TABLET BY MOUTH EVERY DAY AT BEDTIME    Non-STEMI (non-ST elevated myocardial infarction) (H)       blood glucose calibration solution    no brand specified    1 each    Use to calibrate blood glucose monitor as directed.    Type 2 diabetes mellitus without complication (H)       blood glucose lancets standard    no brand specified    100 each    Use to test blood sugar 2 times daily or as directed.    Type 2 diabetes mellitus without complication, without long-term current use of insulin (H)       blood glucose monitoring test strip    no brand specified    3 Box    Use to test blood sugars 2 times daily or as directed-DISPENSE PER PT INSURANCE PER PT PREFERENCE, EFFECTIVE 9/1/16 PT HAS INSURANCE CHANGE TO [employer]    Type 2 diabetes mellitus without complication (H)       carvedilol 25 MG tablet    COREG    180 tablet    Take 1 tablet (25 mg) by mouth 2 times daily (with meals)    Non-STEMI (non-ST elevated myocardial infarction) (H)       gemfibrozil 600 MG tablet    LOPID    60 tablet    Take 1 tablet (600 mg) by mouth 2 times daily    Hypertriglyceridemia       losartan 100 MG tablet    COZAAR    90 tablet    TAKE ONE TABLET BY MOUTH EVERY DAY    Benign essential hypertension       metFORMIN 500 MG tablet    GLUCOPHAGE    180 tablet    Take 1 tablet (500 mg) by mouth 2 times daily (with meals)    Type 2 diabetes mellitus without complication, without long-term current use of insulin (H)       multivitamin, therapeutic with minerals Tabs tablet      Take 1 tablet by mouth daily        nitroGLYcerin 0.4 MG sublingual tablet    NITROSTAT    25 tablet    Place 1 tablet (0.4 mg) under the tongue every 5 minutes as needed for chest pain if you are still having symptoms after 3 doses (15 minutes) call 911.    Non-STEMI (non-ST elevated myocardial  infarction) (H)       order for DME     1 Device    Blood pressure cuff    Benign essential hypertension       ticagrelor 90 MG tablet    BRILINTA    180 tablet    Take 1 tablet (90 mg) by mouth every 12 hours    Non-STEMI (non-ST elevated myocardial infarction) (H)       * Notice:  This list has 2 medication(s) that are the same as other medications prescribed for you. Read the directions carefully, and ask your doctor or other care provider to review them with you.

## 2018-02-20 NOTE — NURSING NOTE
"Chief Complaint   Patient presents with     Diabetes     Thyroid Problem     [facility] F/U       Initial /76 (BP Location: Right arm, Patient Position: Chair, Cuff Size: Adult Large)  Pulse 76  Temp 98.4  F (36.9  C) (Oral)  Resp 16  Wt 210 lb 12.8 oz (95.6 kg)  BMI 34.02 kg/m2 Estimated body mass index is 34.02 kg/(m^2) as calculated from the following:    Height as of 2/9/18: 5' 6\" (1.676 m).    Weight as of this encounter: 210 lb 12.8 oz (95.6 kg).  Medication Reconciliation: complete   Yokasta Bhakta CMA ([facility]AMA)      "

## 2018-03-01 ENCOUNTER — MYC MEDICAL ADVICE (OUTPATIENT)
Dept: FAMILY MEDICINE | Facility: CLINIC | Age: 58
End: 2018-03-01

## 2018-03-07 ENCOUNTER — HOSPITAL ENCOUNTER (OUTPATIENT)
Dept: CARDIOLOGY | Facility: CLINIC | Age: 58
Discharge: HOME | End: 2018-03-07
Attending: EMERGENCY MEDICINE | Admitting: EMERGENCY MEDICINE
Payer: COMMERCIAL

## 2018-03-07 ENCOUNTER — HOSPITAL ENCOUNTER (OUTPATIENT)
Dept: NUCLEAR MEDICINE | Facility: CLINIC | Age: 58
Setting detail: NUCLEAR MEDICINE
Discharge: HOME | End: 2018-03-07
Attending: EMERGENCY MEDICINE | Admitting: EMERGENCY MEDICINE
Payer: COMMERCIAL

## 2018-03-07 DIAGNOSIS — I25.110 CORONARY ARTERY DISEASE INVOLVING NATIVE CORONARY ARTERY OF NATIVE HEART WITH UNSTABLE ANGINA PECTORIS (H): ICD-10-CM

## 2018-03-07 DIAGNOSIS — R07.9 CHEST PAIN, UNSPECIFIED TYPE: ICD-10-CM

## 2018-03-07 PROCEDURE — 78452 HT MUSCLE IMAGE SPECT MULT: CPT

## 2018-03-07 PROCEDURE — 34300033 ZZH RX 343: Performed by: EMERGENCY MEDICINE

## 2018-03-07 PROCEDURE — 25000128 H RX IP 250 OP 636

## 2018-03-07 PROCEDURE — 78452 HT MUSCLE IMAGE SPECT MULT: CPT | Performed by: INTERNAL MEDICINE

## 2018-03-07 PROCEDURE — 93018 CV STRESS TEST I&R ONLY: CPT | Performed by: INTERNAL MEDICINE

## 2018-03-07 PROCEDURE — A9502 TC99M TETROFOSMIN: HCPCS | Performed by: EMERGENCY MEDICINE

## 2018-03-07 PROCEDURE — 93017 CV STRESS TEST TRACING ONLY: CPT

## 2018-03-07 PROCEDURE — 93016 CV STRESS TEST SUPVJ ONLY: CPT | Performed by: INTERNAL MEDICINE

## 2018-03-07 RX ORDER — REGADENOSON 0.08 MG/ML
INJECTION, SOLUTION INTRAVENOUS
Status: COMPLETED
Start: 2018-03-07 | End: 2018-03-07

## 2018-03-07 RX ADMIN — REGADENOSON 0.4 MG: 0.08 INJECTION, SOLUTION INTRAVENOUS at 12:24

## 2018-03-07 RX ADMIN — TETROFOSMIN 11 MCI.: 1.38 INJECTION, POWDER, LYOPHILIZED, FOR SOLUTION INTRAVENOUS at 11:10

## 2018-03-07 RX ADMIN — TETROFOSMIN 31 MCI.: 1.38 INJECTION, POWDER, LYOPHILIZED, FOR SOLUTION INTRAVENOUS at 12:03

## 2018-03-07 NOTE — PROGRESS NOTES
Pre-procedure:    Initial vital signs: /76, HR 82, RR 15  Allergies reviewed: yes   Rhythm: Sinus  Medications taken within 48 hours of procedure: see epic   Last Caffeine: none today  Lung sounds: CTA, no wheezing, crackles or rtx  Health History (COPD, Asthma, etc): none    Procedure: Lexiscan  Reaction/symptoms after receiving Maria Alejandra injection: Shortness of breath and Headache  Intensity of Pain: mild  Rhythm:   1. Vital Signs:/78, , RR 16  2. Vital Signs:/78, , RR 15     Reversal agent: N/A    Post:   Resolution of symptoms?: YES  Vital signs: /74, HR 90, RR 15  Rhythm:   Walk: YES  Comment: Patient tolerated procedure and denies any discomfort or pain.   Return to Radiology

## 2018-03-20 ENCOUNTER — MYC MEDICAL ADVICE (OUTPATIENT)
Dept: FAMILY MEDICINE | Facility: CLINIC | Age: 58
End: 2018-03-20

## 2018-03-26 ENCOUNTER — TELEPHONE (OUTPATIENT)
Dept: FAMILY MEDICINE | Facility: CLINIC | Age: 58
End: 2018-03-26

## 2018-03-26 NOTE — TELEPHONE ENCOUNTER
Charley Soria   Preferred Name:   None  Female, 75 year old, 1941  Preferred Phone:   [phone]  Last Weight:   None  PCP:   Rony Xie MD  Need Interp:   No  Language:   English  Allergies  No Known Allergies  Primary Ins:   MEDICARE  MRN:   [id number]  [facility]:   Declined  Next Appt With Me:   None    EMSC / 05/16/2017 / Norbert  Received: Today      [facility] Scheduling Pool Cc: P Plastics Surg Schedule Pool Em                 Hi there!     Would you please move Charley Soria up to 8:45am on 05/16/2017 with Dr. Toussaint to close the gap in her schedule that morning please? I will contact the patient to let her know that her case has been moved up.       Thanks Much!     Olga   [phone]         Dago Dangelo is a 57 year old male who calls with chest pain.     PRESENTING PROBLEM:  Chest Discomfort    NURSING ASSESSMENT:  Patient complains of chest pressure/discomfort and fatigue. No   Onset:  Off and on discomfort. No cough.   Pain is characterized as dull   Severity mild and off and on  Located right chest   Radiates to shoulder/elbow- reports muscles feel over worked.  Duration intermittent.  Associated symptoms none.  Exacerbated by no known provoking events.  Relieved by rest.  Cardiac risk factors: hypertension, diabetes and high cholesterol.  Associated Medical History: Recent Cardiac and stress test work up- normal  Allergies: No Known Allergies    MEDICATIONS:  Taking medication(s) as prescribed? Yes  Reports he has not had to use his PRN Nitro.     Taking over the counter medication(s) ?No  Any medication side effects? No significant side effects    Any barriers to taking medication(s) as prescribed?  No  Medication(s) improving/managing symptoms?  N/A  Medication reconciliation completed: Yes    Last exam/Treatment:  2/20/2018    NURSING PLAN: Nursing advice to patient OV with PCP rescheduled. Advised can call clinic tomorrow for sooner appointment with PCP. To be seen in ER if sx worsen or do not improve with Nitro tablets. PRN Nitro instructions reviewed with patient    RECOMMENDED DISPOSITION:  Home care advice - See above  Will comply with recommendation: Yes  If further questions/concerns or if symptoms do not improve, worsen or new symptoms develop, call your PCP or [facility] as soon as possible.      Guideline used:  Telephone Triage Protocols for Nurses, Fourth Edition, Natalee Wade RN

## 2018-04-03 ENCOUNTER — OFFICE VISIT (OUTPATIENT)
Dept: FAMILY MEDICINE | Facility: CLINIC | Age: 58
End: 2018-04-03
Payer: COMMERCIAL

## 2018-04-03 VITALS
WEIGHT: 206.5 LBS | TEMPERATURE: 99.2 F | HEART RATE: 78 BPM | BODY MASS INDEX: 33.33 KG/M2 | OXYGEN SATURATION: 96 % | SYSTOLIC BLOOD PRESSURE: 134 MMHG | DIASTOLIC BLOOD PRESSURE: 78 MMHG | RESPIRATION RATE: 20 BRPM

## 2018-04-03 DIAGNOSIS — R53.83 FATIGUE, UNSPECIFIED TYPE: Primary | ICD-10-CM

## 2018-04-03 DIAGNOSIS — M94.0 COSTOCHONDRITIS: ICD-10-CM

## 2018-04-03 PROCEDURE — 99214 OFFICE O/P EST MOD 30 MIN: CPT | Performed by: FAMILY MEDICINE

## 2018-04-03 ASSESSMENT — ENCOUNTER SYMPTOMS
NERVOUS/ANXIOUS: 0
INSOMNIA: 0
SHORTNESS OF BREATH: 0
SENSORY CHANGE: 1
DEPRESSION: 0
PALPITATIONS: 0

## 2018-04-03 ASSESSMENT — ANXIETY QUESTIONNAIRES
GAD7 TOTAL SCORE: 2
3. WORRYING TOO MUCH ABOUT DIFFERENT THINGS: SEVERAL DAYS
7. FEELING AFRAID AS IF SOMETHING AWFUL MIGHT HAPPEN: NOT AT ALL
IF YOU CHECKED OFF ANY PROBLEMS ON THIS QUESTIONNAIRE, HOW DIFFICULT HAVE THESE PROBLEMS MADE IT FOR YOU TO DO YOUR WORK, TAKE CARE OF THINGS AT HOME, OR GET ALONG WITH OTHER PEOPLE: SOMEWHAT DIFFICULT
1. FEELING NERVOUS, ANXIOUS, OR ON EDGE: NOT AT ALL
6. BECOMING EASILY ANNOYED OR IRRITABLE: NOT AT ALL
5. BEING SO RESTLESS THAT IT IS HARD TO SIT STILL: NOT AT ALL
2. NOT BEING ABLE TO STOP OR CONTROL WORRYING: SEVERAL DAYS

## 2018-04-03 ASSESSMENT — PATIENT HEALTH QUESTIONNAIRE - PHQ9: 5. POOR APPETITE OR OVEREATING: NOT AT ALL

## 2018-04-03 NOTE — LETTER
[address], [address]  [facility] [address]  Phone: [phone]  Fax: [phone]    April 3, 2018        Dago Dangelo  [address] [facility] [address]          To whom it may concern:    RE: Dago Dangelo    Patient was seen and treated today at our clinic.  Patient may return to work imediately with the following:  No working or lifting restrictions    Please contact me for questions or concerns.      Sincerely,        Tl Ortiz MD

## 2018-04-03 NOTE — NURSING NOTE
"Chief Complaint   Patient presents with     Fatigue     Musculoskeletal Problem     leg numbness in the inside of both legs     Chest Pain     sharp pain in upper right chest       Initial /78 (BP Location: Right arm, Patient Position: Chair, Cuff Size: Adult Large)  Pulse 78  Temp 99.2  F (37.3  C) (Oral)  Resp 20  Wt 206 lb 8 oz (93.7 kg)  SpO2 96%  BMI 33.33 kg/m2 Estimated body mass index is 33.33 kg/(m^2) as calculated from the following:    Height as of 2/9/18: 5' 6\" (1.676 m).    Weight as of this encounter: 206 lb 8 oz (93.7 kg).  Medication Reconciliation: complete   Yokasta Bhakta CMA (AAMA)  "

## 2018-04-03 NOTE — PROGRESS NOTES
HPI    SUBJECTIVE:   Dago Dangelo is a 57 year old male who presents to clinic today for the following health issues:    Concern - Chest pain, fatigue and leg pain  Onset: x2 weeks    Description:   Sharp pain in upper right chest that occasionally reduces to a dull throb; fatigue is constant increasing during this time; pain in legs runs from ankle to groin on the inner side of both legs    Intensity: severe    Progression of Symptoms:  waxing and waning    Accompanying Signs & Symptoms:  As documented    Previous history of similar problem:   History of mild heart attack    Precipitating factors:   Worsened by: Unknown    Alleviating factors:  Improved by: None    Therapies Tried and outcome: ibuprofen; mild improvement, Aspercrea; no improvement    Reviewed recent stress test, essentially normal.  Did increase metformin about 6 weeks ago.  R upper chest pain, tender to palpation in one spot, feels like a discrete poke.  Prilosec didn't help.    Not bothering him at this point in time.  Also having issues with excessive fatigue - can sleep 12 hours and doesn't feel fully rested when he wakes.  Does snores, but has never been told he stops breathing when he sleep.   First started noticing this about two weeks ago.  Notes that he does have lower energy in the winter, but this seems excessive.  Overall feeling OK, but hasn't been back to work for about 2 weeks.  Initially didn't go to work due to chest pain. Also noting some issues with concentration lately.  Admits some concerns with mood, has had times when has felt irritable, some frustrations at work.  Does have some frustrations at work, but feels this I as much how things are being run at work as his responses to them.     Review of Systems   Constitutional: Positive for malaise/fatigue.   Respiratory: Negative for shortness of breath.    Cardiovascular: Positive for chest pain. Negative for palpitations.   Neurological: Positive for sensory change.    Psychiatric/Behavioral: Negative for depression. The patient is not nervous/anxious and does not have insomnia.          Physical Exam   Constitutional: He is oriented to person, place, and time and well-developed, well-nourished, and in no distress.   Eyes: Conjunctivae and EOM are normal.   Cardiovascular: Normal rate, regular rhythm and normal heart sounds.    Pulmonary/Chest: Effort normal and breath sounds normal.   Musculoskeletal: He exhibits no edema.   Neurological: He is alert and oriented to person, place, and time.   Skin: Skin is warm and dry.   Vitals reviewed.    (R53.83) Fatigue, unspecified type  (primary encounter diagnosis)  Comment: no nga ALEX sx, but is a good age and physical type for this.  Will start with this.  Plan: SLEEP EVALUATION & MANAGEMENT REFERRAL - [facility]          [phone] (Age 18 and up)            (M94.0) Costochondritis  Comment: recently had normal stress test  Plan: observe, NSAIDs prn      RTC in 6w for DM f/u    Tl Ortiz MD

## 2018-04-03 NOTE — MR AVS SNAPSHOT
After Visit Summary   4/3/2018    Dago Dangelo    MRN: [id number]           Patient Information     Date Of Birth          1960        Visit Information        Provider Department      4/3/2018 11:40 AM Tl Ortiz MD [facility]        Today's Diagnoses     Fatigue, unspecified type    -  1    Costochondritis           Follow-ups after your visit        Additional Services     SLEEP EVALUATION & MANAGEMENT REFERRAL - [facility]  [phone] (Age 18 and up)       Please be aware that coverage of these services is subject to the terms and limitations of your health insurance plan.  Call member services at your health plan with any benefit or coverage questions.      Please bring the following to your appointment:    >>   List of current medications   >>   This referral request   >>   Any documents/labs given to you for this referral                      Future tests that were ordered for you today     Open Future Orders        Priority Expected Expires Ordered    SLEEP EVALUATION & MANAGEMENT REFERRAL - [facility]  [phone] (Age 18 and up) Routine  4/3/2019 4/3/2018            Who to contact     If you have questions or need follow up information about today's clinic visit or your schedule please contact [facility] directly at [phone].  Normal or non-critical lab and imaging results will be communicated to you by MyChart, letter or phone within 4 business days after the clinic has received the results. If you do not hear from us within 7 days, please contact the clinic through MyChart or phone. If you have a critical or abnormal lab result, we will notify you by phone as soon as possible.  Submit refill requests through [facility] or call your pharmacy and they will forward the refill request to us. Please allow 3 business days for your refill to be completed.          Additional  Information About Your Visit        MyChart Information     [employer] gives you secure access to your electronic health record. If you see a primary care provider, you can also send messages to your care team and make appointments. If you have questions, please call your primary care clinic.  If you do not have a primary care provider, please call [phone] and they will assist you.        Care EveryWhere ID     This is your Care EveryWhere ID. This could be used by other organizations to access your [place] medical records  [id number]        Your Vitals Were     Pulse Temperature Respirations Pulse Oximetry BMI (Body Mass Index)       78 99.2  F (37.3  C) (Oral) 20 96% 33.33 kg/m2        Blood Pressure from Last 3 Encounters:   04/03/18 134/78   02/20/18 130/76   02/19/18 152/77    Weight from Last 3 Encounters:   04/03/18 206 lb 8 oz (93.7 kg)   02/20/18 210 lb 12.8 oz (95.6 kg)   02/09/18 203 lb (92.1 kg)               Primary Care Provider Office Phone # Fax #    Tl Jimmie Ortiz [phone]       [address] [facility] [address]        Equal Access to Services     CRISTIAN LYNN AH: Hadii aad ku hadasho Soomaali, waaxda luqadaha, qaybta kaalmada adeegyada, waxlaura juniorin haybhavnan alec moralez lasilverio ah. So [facility] [phone].    ATENCIÓN: Si habla español, tiene a alejandre disposición servicios gratuitos de asistencia lingüística. Llame al [phone].    We comply with applicable federal civil rights laws and [place] laws. We do not discriminate on the basis of race, color, national origin, age, disability, sex, sexual orientation, or gender identity.            Thank you!     Thank you for choosing [facility]  for your care. Our goal is always to provide you with excellent care. Hearing back from our patients is one way we can continue to improve our services. Please take a few minutes to complete the written survey that you may receive in the mail after your visit with  us. Thank you!             Your Updated Medication List - Protect others around you: Learn how to safely use, store and throw away your medicines at www.disposemymeds.org.          This list is accurate as of 4/3/18 12:11 PM.  Always use your most recent med list.                   Brand Name Dispense Instructions for use Diagnosis    * ACCU-CHEK COMPLETE Kit     1 Box    1 Box 2 times daily Measure blood sugar 2x daily    Type 2 diabetes mellitus without complication (H)       * blood glucose monitoring meter device kit    no brand specified    1 kit    Use to test blood sugar 2 times daily or as directed. PT HAS NEW PLAN COVERAGE EFFECTIVE 9/1/16, COVERS CONTOUR PRODUCTS    Type 2 diabetes mellitus without complication (H)       amLODIPine 5 MG tablet    NORVASC    180 tablet    TAKE TWO TABLETS BY MOUTH EVERY DAY    Non-STEMI (non-ST elevated myocardial infarction) (H), Benign essential hypertension       aspirin 81 MG EC tablet    ASPIRIN LOW DOSE    100 tablet    Take 1 tablet (81 mg) by mouth daily    Non-STEMI (non-ST elevated myocardial infarction) (H)       atorvastatin 40 MG tablet    LIPITOR    90 tablet    TAKE ONE TABLET BY MOUTH EVERY DAY AT BEDTIME    Non-STEMI (non-ST elevated myocardial infarction) (H)       blood glucose calibration solution    no brand specified    1 each    Use to calibrate blood glucose monitor as directed.    Type 2 diabetes mellitus without complication (H)       blood glucose lancets standard    no brand specified    100 each    Use to test blood sugar 2 times daily or as directed.    Type 2 diabetes mellitus without complication, without long-term current use of insulin (H)       blood glucose monitoring test strip    no brand specified    3 Box    Use to test blood sugars 2 times daily or as directed-DISPENSE PER PT INSURANCE PER PT PREFERENCE, EFFECTIVE 9/1/16 PT HAS INSURANCE CHANGE TO [employer]ODU[employer]    Type 2 diabetes mellitus without complication (H)       carvedilol  25 MG tablet    COREG    180 tablet    Take 1 tablet (25 mg) by mouth 2 times daily (with meals)    Non-STEMI (non-ST elevated myocardial infarction) (H)       gemfibrozil 600 MG tablet    LOPID    60 tablet    Take 1 tablet (600 mg) by mouth 2 times daily    Hypertriglyceridemia       losartan 100 MG tablet    COZAAR    90 tablet    Take 1 tablet (100 mg) by mouth daily    Benign essential hypertension       metFORMIN 500 MG tablet    GLUCOPHAGE    360 tablet    Take 2 tablets (1,000 mg) by mouth 2 times daily (with meals)    Type 2 diabetes mellitus without complication, without long-term current use of insulin (H)       multivitamin, therapeutic with minerals Tabs tablet      Take 1 tablet by mouth daily        nitroGLYcerin 0.4 MG sublingual tablet    NITROSTAT    25 tablet    Place 1 tablet (0.4 mg) under the tongue every 5 minutes as needed for chest pain if you are still having symptoms after 3 doses (15 minutes) call 911.    Non-STEMI (non-ST elevated myocardial infarction) (H)       order for DME     1 Device    Blood pressure cuff    Benign essential hypertension       ticagrelor 90 MG tablet    BRILINTA    180 tablet    Take 1 tablet (90 mg) by mouth every 12 hours    Non-STEMI (non-ST elevated myocardial infarction) (H)       * Notice:  This list has 2 medication(s) that are the same as other medications prescribed for you. Read the directions carefully, and ask your doctor or other care provider to review them with you.

## 2018-04-04 ASSESSMENT — ANXIETY QUESTIONNAIRES: GAD7 TOTAL SCORE: 2

## 2018-04-04 ASSESSMENT — PATIENT HEALTH QUESTIONNAIRE - PHQ9: SUM OF ALL RESPONSES TO PHQ QUESTIONS 1-9: 7

## 2018-04-29 ENCOUNTER — MYC REFILL (OUTPATIENT)
Dept: FAMILY MEDICINE | Facility: CLINIC | Age: 58
End: 2018-04-29

## 2018-04-29 DIAGNOSIS — I21.4 NON-STEMI (NON-ST ELEVATED MYOCARDIAL INFARCTION) (H): ICD-10-CM

## 2018-04-30 NOTE — TELEPHONE ENCOUNTER
Routing refill request to provider for review/approval because:  Labs not current:  AST/ALT     Thi Garcia RN, BS  Clinical Nurse Triage.

## 2018-04-30 NOTE — TELEPHONE ENCOUNTER
Message from [facility]:  Original authorizing provider: MD Jono Villagran would like a refill of the following medications:  ticagrelor (BRILINTA) 90 MG tablet [Tl Ortiz MD]    Preferred pharmacy: [facility] [id number]    Comment:

## 2018-04-30 NOTE — TELEPHONE ENCOUNTER
"Requested Prescriptions   Pending Prescriptions Disp Refills     ticagrelor (BRILINTA) 90 MG tablet 180 tablet 3    Last Written Prescription Date:  5/1/17  Last Fill Quantity: 180,  # refills: 3   Last Office Visit: 4/3/2018   Future Office Visit:      Sig: Take 1 tablet (90 mg) by mouth every 12 hours    Platelet Inhibitors Failed    4/30/2018  8:16 AM       Failed - Normal ALT on file in past 12 months    Recent Labs   Lab Test  12/19/15   0650   ALT  33            Failed - Normal AST on file in past 12 months    Recent Labs   Lab Test  12/19/15   0650   AST  19            Passed - Normal HGB on file in past 12 months    Recent Labs   Lab Test  02/19/18   1200   HGB  13.7              Passed - Normal Platelets on file in past 12 months    Recent Labs   Lab Test  02/19/18   1200   PLT  307              Passed - Recent (12 mo) or future (30 days) visit within the authorizing provider's specialty    Patient had office visit in the last 12 months or has a visit in the next 30 days with authorizing provider or within the authorizing provider's specialty.  See \"Patient Info\" tab in inbasket, or \"Choose Columns\" in Meds & Orders section of the refill encounter.           Passed - Patient is age 18 or older       Passed - Normal serum creatinine on file in past 12 months    Recent Labs   Lab Test  02/19/18   1200   CR  0.89               "

## 2018-05-08 ENCOUNTER — TRANSFERRED RECORDS (OUTPATIENT)
Dept: HEALTH INFORMATION MANAGEMENT | Facility: CLINIC | Age: 58
End: 2018-05-08

## 2018-05-21 DIAGNOSIS — E78.1 HYPERTRIGLYCERIDEMIA: ICD-10-CM

## 2018-05-22 RX ORDER — GEMFIBROZIL 600 MG/1
TABLET, FILM COATED ORAL
Qty: 60 TABLET | Refills: 3 | Status: SHIPPED | OUTPATIENT
Start: 2018-05-22 | End: 2018-08-17

## 2018-05-22 NOTE — TELEPHONE ENCOUNTER
"Requested Prescriptions   Pending Prescriptions Disp Refills     gemfibrozil (LOPID) 600 MG tablet [Pharmacy Med Name: GEMFIBROZIL 600MG TABS] 60 tablet 0    Last Written Prescription Date:  4/23/18  Last Fill Quantity: 60,  # refills: 0   Last Office Visit: 4/3/2018   Future Office Visit:      Sig: TAKE ONE TABLET BY MOUTH TWICE A DAY    Fibrates Passed    5/21/2018  6:38 PM       Passed - Lipid panel on file in past 12 months    Recent Labs   Lab Test  10/05/17   0848   CHOL  168   TRIG  574*   HDL  33*   LDL  Cannot estimate LDL when triglyceride exceeds 400 mg/dL  68   NHDL  135*              Passed - No abnormal creatine kinase in past 12 months    No lab results found.            Passed - Recent (12 mo) or future (30 days) visit within the authorizing provider's specialty    Patient had office visit in the last 12 months or has a visit in the next 30 days with authorizing provider or within the authorizing provider's specialty.  See \"Patient Info\" tab in inbasket, or \"Choose Columns\" in Meds & Orders section of the refill encounter.           Passed - Patient is age 18 or older          "

## 2018-07-22 DIAGNOSIS — I21.4 NON-STEMI (NON-ST ELEVATED MYOCARDIAL INFARCTION) (H): ICD-10-CM

## 2018-07-23 NOTE — TELEPHONE ENCOUNTER
"Requested Prescriptions   Pending Prescriptions Disp Refills     carvedilol (COREG) 25 MG tablet [Pharmacy Med Name: CARVEDILOL 25MG TABS] 180 tablet 3    Last Written Prescription Date:  7/24/17  Last Fill Quantity: 180,  # refills: 3   Last Office Visit: 4/3/2018   Future Office Visit:      Sig: TAKE ONE TABLET BY MOUTH TWICE A DAY WITH MEALS    Beta-Blockers Protocol Passed    7/22/2018  7:39 PM       Passed - Blood pressure under 140/90 in past 12 months    BP Readings from Last 3 Encounters:   04/03/18 134/78   02/20/18 130/76   02/19/18 152/77                Passed - Patient is age 6 or older       Passed - Recent (12 mo) or future (30 days) visit within the authorizing provider's specialty    Patient had office visit in the last 12 months or has a visit in the next 30 days with authorizing provider or within the authorizing provider's specialty.  See \"Patient Info\" tab in inbasket, or \"Choose Columns\" in Meds & Orders section of the refill encounter.              "

## 2018-07-24 RX ORDER — CARVEDILOL 25 MG/1
TABLET ORAL
Qty: 180 TABLET | Refills: 2 | Status: SHIPPED | OUTPATIENT
Start: 2018-07-24 | End: 2019-02-20

## 2018-07-24 NOTE — TELEPHONE ENCOUNTER
Prescription approved per [facility] [place].  Krishna De Souza RN, BSN

## 2018-07-26 ENCOUNTER — OFFICE VISIT (OUTPATIENT)
Dept: FAMILY MEDICINE | Facility: CLINIC | Age: 58
End: 2018-07-26
Payer: COMMERCIAL

## 2018-07-26 VITALS
SYSTOLIC BLOOD PRESSURE: 124 MMHG | DIASTOLIC BLOOD PRESSURE: 80 MMHG | OXYGEN SATURATION: 97 % | RESPIRATION RATE: 16 BRPM | BODY MASS INDEX: 32.77 KG/M2 | TEMPERATURE: 99.1 F | WEIGHT: 203 LBS | HEART RATE: 74 BPM

## 2018-07-26 DIAGNOSIS — J06.9 VIRAL UPPER RESPIRATORY INFECTION: Primary | ICD-10-CM

## 2018-07-26 PROCEDURE — 99213 OFFICE O/P EST LOW 20 MIN: CPT | Performed by: PHYSICIAN ASSISTANT

## 2018-07-26 NOTE — LETTER
July 26, 2018      Dago Dangelo  [address] [facility] [address]        To Whom It May Concern:    Dago Dangelo was seen on 7/26/18.  Please excuse him until 7/3018 due to illness.        Sincerely,        Ryne Villegas PA-C

## 2018-07-26 NOTE — PROGRESS NOTES
HPI    SUBJECTIVE:   Dago Dangelo is a 57 year old male who presents to clinic today for the following health issues:      Acute Illness   Acute illness concerns: st, cough, congestion  Onset: 3 days      Fever: no    Chills/Sweats: no    Headache (location?): YES    Sinus Pressure:no    Conjunctivitis:  no    Ear Pain: no    Rhinorrhea: YES    Congestion: YES    Sore Throat: YES     Cough: YES    Wheeze: YES- slight     Decreased Appetite: no    Nausea: no    Vomiting: no    Diarrhea:  no    Dysuria/Freq.: no    Fatigue/Achiness: YES    Sick/Strep Exposure: YES- possibly coworker has some viral symptoms going on      Therapies Tried and outcome: otc  Chest congestion medication.     Patient here with URI symptoms for about 3 days.  Symptoms include sore throat, chest congestion, headache and runny nose.  Has been trying the following OTC treatments with NO relief:  Some sort of OTC chest congestion with DM in it.  A  Co-worker has been hospitalized twice recently, had some sort of viral infection.        Problem list and histories reviewed & adjusted, as indicated.  Additional history: as documented      Reviewed and updated as needed this visit by clinical staff  Tobacco  Allergies  Meds  Med Hx  Surg Hx  Fam Hx  Soc Hx      Reviewed and updated as needed this visit by Provider         ROS:  Constitutional, HEENT, cardiovascular, pulmonary, gi and gu systems are negative, except as otherwise noted.    OBJECTIVE:     /80  Pulse 74  Temp 99.1  F (37.3  C) (Oral)  Resp 16  Wt 203 lb (92.1 kg)  SpO2 97%  BMI 32.77 kg/m2  Body mass index is 32.77 kg/(m^2).  GENERAL: healthy, alert and no distress  HENT: ear canals and TM's normal, nose and mouth without ulcers or lesions  NECK: no adenopathy, no asymmetry, masses, or scars and thyroid normal to palpation  RESP: lungs clear to auscultation - no rales, rhonchi or wheezes  MS: no gross musculoskeletal defects noted, no edema  SKIN: no suspicious  lesions or rashes  PSYCH: mentation appears normal, affect normal/bright    Diagnostic Test Results:  none    ASSESSMENT/PLAN:   1. Viral upper respiratory infection  - Recommended supportive cares including warm salt water gargles, Tylenol/Ibuprofen as directed OTC, rest, humidifier.  Follow-up if symptoms are worsening or not improving as expected/discussed.    - will need to follow up for diabetes check soon, he is due in August for A1C.      Ryne Villegas PA-C  [facility]      Physical Exam

## 2018-07-26 NOTE — MR AVS SNAPSHOT
After Visit Summary   7/26/2018    Dago Dangelo    MRN: [id number]           Patient Information     Date Of Birth          1960        Visit Information        Provider Department      7/26/2018 11:00 AM Ryne Villegas PA-C [facility]        Today's Diagnoses     Viral upper respiratory infection    -  1       Follow-ups after your visit        Follow-up notes from your care team     Return in about 1 month (around 8/26/2018) for Follow up- diabetes check, fasting.      Who to contact     If you have questions or need follow up information about today's clinic visit or your schedule please contact [facility] directly at [phone].  Normal or non-critical lab and imaging results will be communicated to you by MyChart, letter or phone within 4 business days after the clinic has received the results. If you do not hear from us within 7 days, please contact the clinic through iJoulehart or phone. If you have a critical or abnormal lab result, we will notify you by phone as soon as possible.  Submit refill requests through [employer] or call your pharmacy and they will forward the refill request to us. Please allow 3 business days for your refill to be completed.          Additional Information About Your Visit        MyChart Information     [employer] gives you secure access to your electronic health record. If you see a primary care provider, you can also send messages to your care team and make appointments. If you have questions, please call your primary care clinic.  If you do not have a primary care provider, please call [phone] and they will assist you.        Care EveryWhere ID     This is your Care EveryWhere ID. This could be used by other organizations to access your [place] medical records  [id number]        Your Vitals Were     Pulse Temperature Respirations Pulse Oximetry BMI (Body Mass Index)       74 99.1  F (37.3  C) (Oral) 16 97% 32.77  kg/m2        Blood Pressure from Last 3 Encounters:   07/26/18 124/80   04/03/18 134/78   02/20/18 130/76    Weight from Last 3 Encounters:   07/26/18 203 lb (92.1 kg)   04/03/18 206 lb 8 oz (93.7 kg)   02/20/18 210 lb 12.8 oz (95.6 kg)              Today, you had the following     No orders found for display       Primary Care Provider Office Phone # Fax #    Tl Jimmie Ortiz [phone]       [address]  [facility] [address]        Equal Access to Services     [facility]: Hadii aad ku hadasho Soomaali, waaxda luqadaha, qaybta kaalmada adeegyada, dianna aguilar haybhavnan alec morataya . So [facility] [phone].    ATENCIÓN: Si habla español, tiene a alejandre disposición servicios gratuitos de asistencia lingüística. Llame al [phone].    We comply with applicable federal civil rights laws and [place] laws. We do not discriminate on the basis of race, color, national origin, age, disability, sex, sexual orientation, or gender identity.            Thank you!     Thank you for choosing [facility]  for your care. Our goal is always to provide you with excellent care. Hearing back from our patients is one way we can continue to improve our services. Please take a few minutes to complete the written survey that you may receive in the mail after your visit with us. Thank you!             Your Updated Medication List - Protect others around you: Learn how to safely use, store and throw away your medicines at www.disposemymeds.org.          This list is accurate as of 7/26/18 11:26 AM.  Always use your most recent med list.                   Brand Name Dispense Instructions for use Diagnosis    * ACCU-CHEK COMPLETE Kit     1 Box    1 Box 2 times daily Measure blood sugar 2x daily    Type 2 diabetes mellitus without complication (H)       * blood glucose monitoring meter device kit    no brand specified    1 kit    Use to test blood sugar 2 times daily or as directed. PT HAS  NEW PLAN COVERAGE EFFECTIVE 9/1/16, COVERS CONTOUR PRODUCTS    Type 2 diabetes mellitus without complication (H)       amLODIPine 5 MG tablet    NORVASC    180 tablet    TAKE TWO TABLETS BY MOUTH EVERY DAY    Non-STEMI (non-ST elevated myocardial infarction) (H), Benign essential hypertension       aspirin 81 MG EC tablet    ASPIRIN LOW DOSE    100 tablet    Take 1 tablet (81 mg) by mouth daily    Non-STEMI (non-ST elevated myocardial infarction) (H)       atorvastatin 40 MG tablet    LIPITOR    90 tablet    Take 1 tablet (40 mg) by mouth daily    Non-STEMI (non-ST elevated myocardial infarction) (H)       blood glucose calibration solution    no brand specified    1 each    Use to calibrate blood glucose monitor as directed.    Type 2 diabetes mellitus without complication (H)       blood glucose lancets standard    no brand specified    100 each    Use to test blood sugar 2 times daily or as directed.    Type 2 diabetes mellitus without complication, without long-term current use of insulin (H)       blood glucose monitoring test strip    no brand specified    3 Box    Use to test blood sugars 2 times daily or as directed-DISPENSE PER PT INSURANCE PER PT PREFERENCE, EFFECTIVE 9/1/16 PT HAS INSURANCE CHANGE TO [employer]    Type 2 diabetes mellitus without complication (H)       carvedilol 25 MG tablet    COREG    180 tablet    TAKE ONE TABLET BY MOUTH TWICE A DAY WITH MEALS    Non-STEMI (non-ST elevated myocardial infarction) (H)       gemfibrozil 600 MG tablet    LOPID    60 tablet    TAKE ONE TABLET BY MOUTH TWICE A DAY    Hypertriglyceridemia       losartan 100 MG tablet    COZAAR    90 tablet    Take 1 tablet (100 mg) by mouth daily    Benign essential hypertension       metFORMIN 500 MG tablet    GLUCOPHAGE    360 tablet    Take 2 tablets (1,000 mg) by mouth 2 times daily (with meals)    Type 2 diabetes mellitus without complication, without long-term current use of insulin (H)       multivitamin,  therapeutic with minerals Tabs tablet      Take 1 tablet by mouth daily        nitroGLYcerin 0.4 MG sublingual tablet    NITROSTAT    25 tablet    Place 1 tablet (0.4 mg) under the tongue every 5 minutes as needed for chest pain if you are still having symptoms after 3 doses (15 minutes) call 911.    Non-STEMI (non-ST elevated myocardial infarction) (H)       order for DME     1 Device    Blood pressure cuff    Benign essential hypertension       ticagrelor 90 MG tablet    BRILINTA    180 tablet    Take 1 tablet (90 mg) by mouth every 12 hours    Non-STEMI (non-ST elevated myocardial infarction) (H)       * Notice:  This list has 2 medication(s) that are the same as other medications prescribed for you. Read the directions carefully, and ask your doctor or other care provider to review them with you.

## 2018-08-13 ENCOUNTER — TELEPHONE (OUTPATIENT)
Dept: FAMILY MEDICINE | Facility: CLINIC | Age: 58
End: 2018-08-13

## 2018-08-13 NOTE — TELEPHONE ENCOUNTER
Panel Management Review      Patient has the following on his problem list:     Diabetes    ASA: Passed    Last A1C  Lab Results   Component Value Date    A1C 7.6 02/20/2018    A1C 6.7 07/07/2017    A1C 6.8 01/24/2017    A1C 6.5 09/21/2016    A1C 6.6 03/25/2016     A1C tested: FAILED    Last LDL:    Lab Results   Component Value Date    CHOL 168 10/05/2017     Lab Results   Component Value Date    HDL 33 10/05/2017     Lab Results   Component Value Date    LDL  10/05/2017     Cannot estimate LDL when triglyceride exceeds 400 mg/dL    LDL 68 10/05/2017     Lab Results   Component Value Date    TRIG 574 10/05/2017     No results found for: CHOLHDLRATIO  Lab Results   Component Value Date    NHDL 135 10/05/2017       Is the patient on a Statin? YES             Is the patient on Aspirin? YES    Medications     HMG CoA Reductase Inhibitors    atorvastatin (LIPITOR) 40 MG tablet    Salicylates    aspirin (ASPIRIN LOW DOSE) 81 MG EC tablet          Last three blood pressure readings:  BP Readings from Last 3 Encounters:   07/26/18 124/80   04/03/18 134/78   02/20/18 130/76       Date of last diabetes office visit: 2/12/18     Tobacco History:     History   Smoking Status     Current Every Day Smoker     Packs/day: 1.00     Years: 30.00     Types: Cigarettes   Smokeless Tobacco     Never Used     Comment: may have one once weekly           Composite cancer screening  Chart review shows that this patient is due/due soon for the following None  Summary:    Patient is due/failing the following:   A1C    Action needed:   Patient needs office visit for diabetes follow up and Patient needs non-fasting lab only appointment    Type of outreach:    Detailed message left per Consent to Communicate.  oYkasta Bhakta CMA ([facility])      Questions for provider review:    None                                                                                                                                    Yokasta Bhakta CMA  ([facility])

## 2018-08-17 ENCOUNTER — OFFICE VISIT (OUTPATIENT)
Dept: FAMILY MEDICINE | Facility: CLINIC | Age: 58
End: 2018-08-17
Payer: COMMERCIAL

## 2018-08-17 VITALS
DIASTOLIC BLOOD PRESSURE: 78 MMHG | SYSTOLIC BLOOD PRESSURE: 136 MMHG | RESPIRATION RATE: 18 BRPM | HEART RATE: 72 BPM | TEMPERATURE: 98.9 F | BODY MASS INDEX: 33.09 KG/M2 | WEIGHT: 205 LBS

## 2018-08-17 DIAGNOSIS — F17.200 TOBACCO USE DISORDER: ICD-10-CM

## 2018-08-17 DIAGNOSIS — I10 BENIGN ESSENTIAL HYPERTENSION: ICD-10-CM

## 2018-08-17 DIAGNOSIS — E11.9 TYPE 2 DIABETES MELLITUS WITHOUT COMPLICATION, WITHOUT LONG-TERM CURRENT USE OF INSULIN (H): Primary | ICD-10-CM

## 2018-08-17 DIAGNOSIS — E78.1 HYPERTRIGLYCERIDEMIA: ICD-10-CM

## 2018-08-17 LAB — HBA1C MFR BLD: 7.4 % (ref 0–5.6)

## 2018-08-17 PROCEDURE — 83036 HEMOGLOBIN GLYCOSYLATED A1C: CPT | Performed by: PHYSICIAN ASSISTANT

## 2018-08-17 PROCEDURE — 80053 COMPREHEN METABOLIC PANEL: CPT | Performed by: PHYSICIAN ASSISTANT

## 2018-08-17 PROCEDURE — 80061 LIPID PANEL: CPT | Performed by: PHYSICIAN ASSISTANT

## 2018-08-17 PROCEDURE — 36415 COLL VENOUS BLD VENIPUNCTURE: CPT | Performed by: PHYSICIAN ASSISTANT

## 2018-08-17 PROCEDURE — 99214 OFFICE O/P EST MOD 30 MIN: CPT | Performed by: PHYSICIAN ASSISTANT

## 2018-08-17 RX ORDER — LOSARTAN POTASSIUM 100 MG/1
100 TABLET ORAL DAILY
Qty: 90 TABLET | Refills: 1 | Status: SHIPPED | OUTPATIENT
Start: 2018-08-17 | End: 2019-03-18

## 2018-08-17 RX ORDER — GEMFIBROZIL 600 MG/1
600 TABLET, FILM COATED ORAL 2 TIMES DAILY
Qty: 60 TABLET | Refills: 3 | Status: SHIPPED | OUTPATIENT
Start: 2018-08-17 | End: 2018-12-17

## 2018-08-17 NOTE — MR AVS SNAPSHOT
After Visit Summary   8/17/2018    Dago Dangelo    MRN: [id number]           Patient Information     Date Of Birth          1960        Visit Information        Provider Department      8/17/2018 8:00 AM Ryne Villegas PA-C [facility]        Today's Diagnoses     Type 2 diabetes mellitus without complication, without long-term current use of insulin (H)    -  1    Hypertriglyceridemia        Benign essential hypertension        Tobacco use disorder           Follow-ups after your visit        Follow-up notes from your care team     Return in about 6 months (around 2/17/2019) for Med Check.      Who to contact     If you have questions or need follow up information about today's clinic visit or your schedule please contact [facility] directly at [phone].  Normal or non-critical lab and imaging results will be communicated to you by PakS[employer]hart, letter or phone within 4 business days after the clinic has received the results. If you do not hear from us within 7 days, please contact the clinic through [employer] or phone. If you have a critical or abnormal lab result, we will notify you by phone as soon as possible.  Submit refill requests through [employer] or call your pharmacy and they will forward the refill request to us. Please allow 3 business days for your refill to be completed.          Additional Information About Your Visit        MyChart Information     [employer] gives you secure access to your electronic health record. If you see a primary care provider, you can also send messages to your care team and make appointments. If you have questions, please call your primary care clinic.  If you do not have a primary care provider, please call [phone] and they will assist you.        Care EveryWhere ID     This is your Care EveryWhere ID. This could be used by other organizations to access your [place] medical records  [id number]        Your  Vitals Were     Pulse Temperature Respirations BMI (Body Mass Index)          72 98.9  F (37.2  C) (Oral) 18 33.09 kg/m2         Blood Pressure from Last 3 Encounters:   08/17/18 136/78   07/26/18 124/80   04/03/18 134/78    Weight from Last 3 Encounters:   08/17/18 205 lb (93 kg)   07/26/18 203 lb (92.1 kg)   04/03/18 206 lb 8 oz (93.7 kg)              We Performed the Following     Comprehensive metabolic panel     HEMOGLOBIN A1C     Lipid panel reflex to direct LDL Fasting          Today's Medication Changes          These changes are accurate as of 8/17/18  8:37 AM.  If you have any questions, ask your nurse or doctor.               These medicines have changed or have updated prescriptions.        Dose/Directions    gemfibrozil 600 MG tablet   Commonly known as:  LOPID   This may have changed:  See the new instructions.   Used for:  Hypertriglyceridemia   Changed by:  Ryne Villegas PA-C        Dose:  600 mg   Take 1 tablet (600 mg) by mouth 2 times daily   Quantity:  60 tablet   Refills:  3            Where to get your medicines      These medications were sent to [facility] [address] [facility]  [address] [facility] [address]     Phone:  [phone]     gemfibrozil 600 MG tablet    losartan 100 MG tablet    metFORMIN 500 MG tablet                Primary Care Provider Office Phone # Fax #    Tl Jimmie Ortiz [phone]       [address] [facility] [address]        Equal Access to Services     [facility]: Hadii aad ku hadasho Soomaali, waaxda luqadaha, qaybta kaalmada adeegyada, waxay idiin haybhavnan alec morataya . So [facility] [phone].    ATENCIÓN: Si habla español, tiene a alejandre disposición servicios gratuitos de asistencia lingüística. Llame al [phone].    We comply with applicable federal civil rights laws and [place] laws. We do not discriminate on the basis of race, color, national origin, age, disability, sex,  sexual orientation, or gender identity.            Thank you!     Thank you for choosing [facility]  for your care. Our goal is always to provide you with excellent care. Hearing back from our patients is one way we can continue to improve our services. Please take a few minutes to complete the written survey that you may receive in the mail after your visit with us. Thank you!             Your Updated Medication List - Protect others around you: Learn how to safely use, store and throw away your medicines at www.disposemymeds.org.          This list is accurate as of 8/17/18  8:37 AM.  Always use your most recent med list.                   Brand Name Dispense Instructions for use Diagnosis    * ACCU-CHEK COMPLETE Kit     1 Box    1 Box 2 times daily Measure blood sugar 2x daily    Type 2 diabetes mellitus without complication (H)       * blood glucose monitoring meter device kit    no brand specified    1 kit    Use to test blood sugar 2 times daily or as directed. PT HAS NEW PLAN COVERAGE EFFECTIVE 9/1/16, COVERS [employer] PRODUCTS    Type 2 diabetes mellitus without complication (H)       amLODIPine 5 MG tablet    NORVASC    180 tablet    TAKE TWO TABLETS BY MOUTH EVERY DAY    Non-STEMI (non-ST elevated myocardial infarction) (H), Benign essential hypertension       aspirin 81 MG EC tablet    ASPIRIN LOW DOSE    100 tablet    Take 1 tablet (81 mg) by mouth daily    Non-STEMI (non-ST elevated myocardial infarction) (H)       atorvastatin 40 MG tablet    LIPITOR    90 tablet    Take 1 tablet (40 mg) by mouth daily    Non-STEMI (non-ST elevated myocardial infarction) (H)       blood glucose calibration solution    no brand specified    1 each    Use to calibrate blood glucose monitor as directed.    Type 2 diabetes mellitus without complication (H)       blood glucose lancets standard    no brand specified    100 each    Use to test blood sugar 2 times daily or as directed.    Type 2 diabetes  mellitus without complication, without long-term current use of insulin (H)       blood glucose monitoring test strip    no brand specified    3 Box    Use to test blood sugars 2 times daily or as directed-DISPENSE PER PT INSURANCE PER PT PREFERENCE, EFFECTIVE 9/1/16 PT HAS INSURANCE CHANGE TO [employer]    Type 2 diabetes mellitus without complication (H)       carvedilol 25 MG tablet    COREG    180 tablet    TAKE ONE TABLET BY MOUTH TWICE A DAY WITH MEALS    Non-STEMI (non-ST elevated myocardial infarction) (H)       gemfibrozil 600 MG tablet    LOPID    60 tablet    Take 1 tablet (600 mg) by mouth 2 times daily    Hypertriglyceridemia       losartan 100 MG tablet    COZAAR    90 tablet    Take 1 tablet (100 mg) by mouth daily    Benign essential hypertension       metFORMIN 500 MG tablet    GLUCOPHAGE    360 tablet    Take 2 tablets (1,000 mg) by mouth 2 times daily (with meals)    Type 2 diabetes mellitus without complication, without long-term current use of insulin (H)       multivitamin, therapeutic with minerals Tabs tablet      Take 1 tablet by mouth daily        nitroGLYcerin 0.4 MG sublingual tablet    NITROSTAT    25 tablet    Place 1 tablet (0.4 mg) under the tongue every 5 minutes as needed for chest pain if you are still having symptoms after 3 doses (15 minutes) call 911.    Non-STEMI (non-ST elevated myocardial infarction) (H)       order for DME     1 Device    Blood pressure cuff    Benign essential hypertension       ticagrelor 90 MG tablet    BRILINTA    180 tablet    Take 1 tablet (90 mg) by mouth every 12 hours    Non-STEMI (non-ST elevated myocardial infarction) (H)       * Notice:  This list has 2 medication(s) that are the same as other medications prescribed for you. Read the directions carefully, and ask your doctor or other care provider to review them with you.

## 2018-08-17 NOTE — PROGRESS NOTES
SUBJECTIVE:   Dago Dangelo is a 57 year old male who presents to clinic today for the following health issues:      History of Present Illness     Diabetes:     Frequency of checking blood sugars::  Rarely    Diabetic concerns::  None    Hypoglycemia symptoms::  None    Paraesthesia present::  No    Eye Exam in the last year::  Yes    5/8/18    Diabetes Management Resources    Diet:  Regular (no restrictions)  Frequency of exercise:  2-3 days/week  Duration of exercise:  Less than 15 minutes  Taking medications regularly:  Yes  Medication side effects:  None  Additional concerns today:  No    Diabetes Follow-up      Patient is checking blood sugars: rarely.     Diabetic concerns: None     Symptoms of hypoglycemia (low blood sugar): none     Paresthesias (numbness or burning in feet) or sores: No     Date of last diabetic eye exam: 5/2018    Diabetes Management Resources    Hyperlipidemia Follow-Up      Rate your low fat/cholesterol diet?: good    Taking statin?  Yes, no muscle aches from statin    Other lipid medications/supplements?:  Gemfibrozil, without side effects    Hypertension Follow-up      Outpatient blood pressures are being checked at home.  Results are 125-135/80's.    Low Salt Diet: no added salt    BP Readings from Last 2 Encounters:   08/17/18 136/78   07/26/18 124/80     Hemoglobin A1C (%)   Date Value   02/20/2018 7.6 (H)   07/07/2017 6.7 (H)     LDL Cholesterol Calculated (mg/dL)   Date Value   10/05/2017     Cannot estimate LDL when triglyceride exceeds 400 mg/dL   07/07/2017     Cannot estimate LDL when triglyceride exceeds 400 mg/dL     LDL Cholesterol Direct (mg/dL)   Date Value   10/05/2017 68   07/07/2017 68     Problem list and histories reviewed & adjusted, as indicated.  Additional history: as documented      Labs reviewed in EPIC    ROS:  Constitutional, HEENT, cardiovascular, pulmonary, gi and gu systems are negative, except as otherwise noted.    OBJECTIVE:     /78 (BP  Location: Right arm, Patient Position: Sitting, Cuff Size: Adult Regular)  Pulse 72  Temp 98.9  F (37.2  C) (Oral)  Resp 18  Wt 205 lb (93 kg)  BMI 33.09 kg/m2  Body mass index is 33.09 kg/(m^2).  GENERAL: healthy, alert and no distress  RESP: lungs clear to auscultation - no rales, rhonchi or wheezes  CV: regular rate and rhythm, normal S1 S2, no S3 or S4, no murmur, click or rub, no peripheral edema and peripheral pulses strong  ABDOMEN: soft, nontender, no hepatosplenomegaly, no masses and bowel sounds normal  MS: no gross musculoskeletal defects noted, no edema  SKIN: no suspicious lesions or rashes  PSYCH: mentation appears normal, affect normal/bright    Diagnostic Test Results:  Results for orders placed or performed in visit on 08/17/18 (from the past 24 hour(s))   HEMOGLOBIN A1C   Result Value Ref Range    Hemoglobin A1C 7.4 (H) 0 - 5.6 %       ASSESSMENT/PLAN:   1. Type 2 diabetes mellitus without complication, without long-term current use of insulin (H)  A1C is basically stable.  No med changes today.  Recheck OK in 6 months.  - HEMOGLOBIN A1C  - metFORMIN (GLUCOPHAGE) 500 MG tablet; Take 2 tablets (1,000 mg) by mouth 2 times daily (with meals)  Dispense: 360 tablet; Refill: 1  - Comprehensive metabolic panel  - Lipid panel reflex to direct LDL Fasting    2. Hypertriglyceridemia  Refilled.  We discussed diet choices and exercise today.  - gemfibrozil (LOPID) 600 MG tablet; Take 1 tablet (600 mg) by mouth 2 times daily  Dispense: 60 tablet; Refill: 3  - Lipid panel reflex to direct LDL Fasting    3. Benign essential hypertension  Refilled.  BP controlled but close to borderline today.  Will recheck again in 6 months.  - losartan (COZAAR) 100 MG tablet; Take 1 tablet (100 mg) by mouth daily  Dispense: 90 tablet; Refill: 1  - Comprehensive metabolic panel    4. Tobacco use disorder  - discussed this today, not ready to quit.          Ryne Villegas PA-C  [facility]

## 2018-08-18 LAB
ALBUMIN SERPL-MCNC: 4.2 G/DL (ref 3.4–5)
ALP SERPL-CCNC: 67 U/L (ref 40–150)
ALT SERPL W P-5'-P-CCNC: 27 U/L (ref 0–70)
ANION GAP SERPL CALCULATED.3IONS-SCNC: 9 MMOL/L (ref 3–14)
AST SERPL W P-5'-P-CCNC: 17 U/L (ref 0–45)
BILIRUB SERPL-MCNC: 0.4 MG/DL (ref 0.2–1.3)
BUN SERPL-MCNC: 14 MG/DL (ref 7–30)
CALCIUM SERPL-MCNC: 8.9 MG/DL (ref 8.5–10.1)
CHLORIDE SERPL-SCNC: 109 MMOL/L (ref 94–109)
CHOLEST SERPL-MCNC: 149 MG/DL
CO2 SERPL-SCNC: 22 MMOL/L (ref 20–32)
CREAT SERPL-MCNC: 0.76 MG/DL (ref 0.66–1.25)
GFR SERPL CREATININE-BSD FRML MDRD: >90 ML/MIN/1.7M2
GLUCOSE SERPL-MCNC: 146 MG/DL (ref 70–99)
HDLC SERPL-MCNC: 27 MG/DL
LDLC SERPL CALC-MCNC: 46 MG/DL
NONHDLC SERPL-MCNC: 122 MG/DL
POTASSIUM SERPL-SCNC: 4.6 MMOL/L (ref 3.4–5.3)
PROT SERPL-MCNC: 7.4 G/DL (ref 6.8–8.8)
SODIUM SERPL-SCNC: 140 MMOL/L (ref 133–144)
TRIGL SERPL-MCNC: 379 MG/DL

## 2018-09-18 DIAGNOSIS — I21.4 NON-STEMI (NON-ST ELEVATED MYOCARDIAL INFARCTION) (H): ICD-10-CM

## 2018-09-18 DIAGNOSIS — I10 BENIGN ESSENTIAL HYPERTENSION: ICD-10-CM

## 2018-09-18 NOTE — TELEPHONE ENCOUNTER
"Requested Prescriptions   Pending Prescriptions Disp Refills     amLODIPine (NORVASC) 5 MG tablet [Pharmacy Med Name: AMLODIPINE BESYLATE 5MG TABS] 180 tablet 1    Last Written Prescription Date:  3/20/18  Last Fill Quantity: 180,  # refills: 1   Last Office Visit: 8/17/2018   Future Office Visit:      Sig: TAKE TWO TABLETS BY MOUTH EVERY DAY    Calcium Channel Blockers Protocol  Passed    9/18/2018  4:15 AM       Passed - Blood pressure under 140/90 in past 12 months    BP Readings from Last 3 Encounters:   08/17/18 136/78   07/26/18 124/80   04/03/18 134/78                Passed - Recent (12 mo) or future (30 days) visit within the authorizing provider's specialty    Patient had office visit in the last 12 months or has a visit in the next 30 days with authorizing provider or within the authorizing provider's specialty.  See \"Patient Info\" tab in inbasket, or \"Choose Columns\" in Meds & Orders section of the refill encounter.           Passed - Patient is age 18 or older       Passed - Normal serum creatinine on file in past 12 months    Recent Labs   Lab Test  08/17/18   0815   CR  0.76               "

## 2018-09-19 RX ORDER — AMLODIPINE BESYLATE 5 MG/1
TABLET ORAL
Qty: 180 TABLET | Refills: 1 | Status: SHIPPED | OUTPATIENT
Start: 2018-09-19 | End: 2019-03-18

## 2018-09-19 NOTE — TELEPHONE ENCOUNTER
Prescription approved per [facility] Refill Protocol  Thi Garcia RN BS

## 2018-10-22 ENCOUNTER — OFFICE VISIT (OUTPATIENT)
Dept: FAMILY MEDICINE | Facility: CLINIC | Age: 58
End: 2018-10-22
Payer: COMMERCIAL

## 2018-10-22 VITALS
RESPIRATION RATE: 20 BRPM | WEIGHT: 196 LBS | HEART RATE: 68 BPM | TEMPERATURE: 98.4 F | BODY MASS INDEX: 31.64 KG/M2 | DIASTOLIC BLOOD PRESSURE: 86 MMHG | SYSTOLIC BLOOD PRESSURE: 146 MMHG

## 2018-10-22 DIAGNOSIS — J01.90 ACUTE SINUSITIS WITH SYMPTOMS > 10 DAYS: Primary | ICD-10-CM

## 2018-10-22 PROCEDURE — 99213 OFFICE O/P EST LOW 20 MIN: CPT | Performed by: FAMILY MEDICINE

## 2018-10-22 RX ORDER — AMOXICILLIN 500 MG/1
500 CAPSULE ORAL 3 TIMES DAILY
Qty: 30 CAPSULE | Refills: 0 | Status: SHIPPED | OUTPATIENT
Start: 2018-10-22 | End: 2018-11-30

## 2018-10-22 ASSESSMENT — ENCOUNTER SYMPTOMS
ABDOMINAL PAIN: 0
CONSTIPATION: 0
SPUTUM PRODUCTION: 1
FEVER: 0
NAUSEA: 0
SORE THROAT: 1
CARDIOVASCULAR NEGATIVE: 1
COUGH: 1
SINUS PAIN: 0
DIARRHEA: 0
HEADACHES: 1
VOMITING: 0
CHILLS: 1

## 2018-10-22 NOTE — MR AVS SNAPSHOT
After Visit Summary   10/22/2018    Dago Dangelo    MRN: [id number]           Patient Information     Date Of Birth          1960        Visit Information        Provider Department      10/22/2018 2:20 PM Tl Ortiz MD [facility]        Today's Diagnoses     Acute sinusitis with symptoms > 10 days    -  1       Follow-ups after your visit        Follow-up notes from your care team     Return in about 1 week (around 10/29/2018), or if symptoms worsen or fail to improve.      Who to contact     If you have questions or need follow up information about today's clinic visit or your schedule please contact [facility] directly at [phone].  Normal or non-critical lab and imaging results will be communicated to you by MyChart, letter or phone within 4 business days after the clinic has received the results. If you do not hear from us within 7 days, please contact the clinic through Nuregohart or phone. If you have a critical or abnormal lab result, we will notify you by phone as soon as possible.  Submit refill requests through Cinema One or call your pharmacy and they will forward the refill request to us. Please allow 3 business days for your refill to be completed.          Additional Information About Your Visit        MyChart Information     Cinema One gives you secure access to your electronic health record. If you see a primary care provider, you can also send messages to your care team and make appointments. If you have questions, please call your primary care clinic.  If you do not have a primary care provider, please call [phone] and they will assist you.        Care EveryWhere ID     This is your Care EveryWhere ID. This could be used by other organizations to access your [place] medical records  [id number]        Your Vitals Were     Pulse Temperature Respirations BMI (Body Mass Index)          68 98.4  F (36.9  C) (Oral) 20 31.64 kg/m2          Blood Pressure from Last 3 Encounters:   10/22/18 146/86   08/17/18 136/78   07/26/18 124/80    Weight from Last 3 Encounters:   10/22/18 196 lb (88.9 kg)   08/17/18 205 lb (93 kg)   07/26/18 203 lb (92.1 kg)              Today, you had the following     No orders found for display         Today's Medication Changes          These changes are accurate as of 10/22/18  2:50 PM.  If you have any questions, ask your nurse or doctor.               Start taking these medicines.        Dose/Directions    amoxicillin 500 MG capsule   Commonly known as:  AMOXIL   Used for:  Acute sinusitis with symptoms > 10 days   Started by:  Tl Ortiz MD        Dose:  500 mg   Take 1 capsule (500 mg) by mouth 3 times daily   Quantity:  30 capsule   Refills:  0            Where to get your medicines      These medications were sent to [facility] [place], [place] - [address]  [address] [place] [address]     Phone:  [phone]     amoxicillin 500 MG capsule                Primary Care Provider Office Phone # Fax #    Tl Ortiz [phone]       [id number]  [address] [facility] [address]        Equal Access to Services     CRISTIAN LYNN AH: Hadii aad ku hadasho Soomaali, waaxda luqadaha, qaybta kaalmada adeegyada, waxay idiin haybhavnan alec grissom. So [facility] [phone].    ATENCIÓN: Si habla español, tiene a alejandre disposición servicios gratuitos de asistencia lingüística. Llame al [phone].    We comply with applicable federal civil rights laws and [place] laws. We do not discriminate on the basis of race, color, national origin, age, disability, sex, sexual orientation, or gender identity.            Thank you!     Thank you for choosing [facility]  for your care. Our goal is always to provide you with excellent care. Hearing back from our patients is one way we can continue to improve our services. Please take a few minutes to  complete the written survey that you may receive in the mail after your visit with us. Thank you!             Your Updated Medication List - Protect others around you: Learn how to safely use, store and throw away your medicines at www.disposemymeds.org.          This list is accurate as of 10/22/18  2:50 PM.  Always use your most recent med list.                   Brand Name Dispense Instructions for use Diagnosis    * ACCU-CHEK COMPLETE Kit     1 Box    1 Box 2 times daily Measure blood sugar 2x daily    Type 2 diabetes mellitus without complication (H)       * blood glucose monitoring meter device kit    no brand specified    1 kit    Use to test blood sugar 2 times daily or as directed. PT HAS NEW PLAN COVERAGE EFFECTIVE 9/1/16, COVERS CONTOUR PRODUCTS    Type 2 diabetes mellitus without complication (H)       amLODIPine 5 MG tablet    NORVASC    180 tablet    TAKE TWO TABLETS BY MOUTH EVERY DAY    Non-STEMI (non-ST elevated myocardial infarction) (H), Benign essential hypertension       amoxicillin 500 MG capsule    AMOXIL    30 capsule    Take 1 capsule (500 mg) by mouth 3 times daily    Acute sinusitis with symptoms > 10 days       aspirin 81 MG EC tablet    ASPIRIN LOW DOSE    100 tablet    Take 1 tablet (81 mg) by mouth daily    Non-STEMI (non-ST elevated myocardial infarction) (H)       atorvastatin 40 MG tablet    LIPITOR    90 tablet    Take 1 tablet (40 mg) by mouth daily    Non-STEMI (non-ST elevated myocardial infarction) (H)       blood glucose calibration solution    no brand specified    1 each    Use to calibrate blood glucose monitor as directed.    Type 2 diabetes mellitus without complication (H)       blood glucose lancets standard    no brand specified    100 each    Use to test blood sugar 2 times daily or as directed.    Type 2 diabetes mellitus without complication, without long-term current use of insulin (H)       blood glucose monitoring test strip    no brand specified    3 Box    Use  to test blood sugars 2 times daily or as directed-DISPENSE PER PT INSURANCE PER PT PREFERENCE, EFFECTIVE 9/1/16 PT HAS INSURANCE CHANGE TO CONTOUR PRODUCTS    Type 2 diabetes mellitus without complication (H)       carvedilol 25 MG tablet    COREG    180 tablet    TAKE ONE TABLET BY MOUTH TWICE A DAY WITH MEALS    Non-STEMI (non-ST elevated myocardial infarction) (H)       gemfibrozil 600 MG tablet    LOPID    60 tablet    Take 1 tablet (600 mg) by mouth 2 times daily    Hypertriglyceridemia       losartan 100 MG tablet    COZAAR    90 tablet    Take 1 tablet (100 mg) by mouth daily    Benign essential hypertension       metFORMIN 500 MG tablet    GLUCOPHAGE    360 tablet    Take 2 tablets (1,000 mg) by mouth 2 times daily (with meals)    Type 2 diabetes mellitus without complication, without long-term current use of insulin (H)       multivitamin, therapeutic with minerals Tabs tablet      Take 1 tablet by mouth daily        nitroGLYcerin 0.4 MG sublingual tablet    NITROSTAT    25 tablet    Place 1 tablet (0.4 mg) under the tongue every 5 minutes as needed for chest pain if you are still having symptoms after 3 doses (15 minutes) call 911.    Non-STEMI (non-ST elevated myocardial infarction) (H)       order for DME     1 Device    Blood pressure cuff    Benign essential hypertension       ticagrelor 90 MG tablet    BRILINTA    180 tablet    Take 1 tablet (90 mg) by mouth every 12 hours    Non-STEMI (non-ST elevated myocardial infarction) (H)       * Notice:  This list has 2 medication(s) that are the same as other medications prescribed for you. Read the directions carefully, and ask your doctor or other care provider to review them with you.

## 2018-10-22 NOTE — PROGRESS NOTES
HPI    SUBJECTIVE:   Dago Dangelo is a 57 year old male who presents to clinic today for the following health issues:    Acute Illness   Acute illness concerns: URI  Onset: x3 weeks    Fever: YES- low grade    Chills/Sweats: YES- sweats at night    Headache (location?): YES    Sinus Pressure:no    Conjunctivitis:  no    Ear Pain: no    Rhinorrhea: YES    Congestion: YES    Sore Throat: no     Cough: YES-non-productive    Wheeze: YES    Decreased Appetite: no     Nausea: no    Vomiting: no    Diarrhea:  YES    Dysuria/Freq.: no    Fatigue/Achiness: YES    Sick/Strep Exposure: YES- granddaughter has been sick     Therapies Tried and outcome: [place] cold and cough; cough syrup    Very fatigues, sleeping often.  Chest feels heavy, but no nga wheezing of shortness of breath.        Review of Systems   Constitutional: Positive for chills and malaise/fatigue. Negative for fever.   HENT: Positive for congestion and sore throat. Negative for sinus pain.    Respiratory: Positive for cough and sputum production.    Cardiovascular: Negative.    Gastrointestinal: Negative for abdominal pain, constipation, diarrhea, nausea and vomiting.   Skin: Negative for rash.   Neurological: Positive for headaches.         Physical Exam   Constitutional: He is well-developed, well-nourished, and in no distress. No distress.   HENT:   Right Ear: Tympanic membrane, external ear and ear canal normal.   Left Ear: Tympanic membrane, external ear and ear canal normal.   Mouth/Throat: Oropharynx is clear and moist. No oropharyngeal exudate.   Eyes: Conjunctivae are normal.   Neck: Neck supple.   Cardiovascular: Normal rate, regular rhythm and normal heart sounds.    Pulmonary/Chest: Effort normal and breath sounds normal.   Lymphadenopathy:     He has no cervical adenopathy.   Skin: Skin is warm and dry. No rash noted.   Nursing note and vitals reviewed.    (J01.90) Acute sinusitis with symptoms > 10 days  (primary encounter  diagnosis)  Comment: several weeks of continuous sx  Plan: amoxicillin (AMOXIL) 500 MG capsule              RTC in 1w    Tl Ortiz MD

## 2018-10-22 NOTE — LETTER
[address], [address]  [facility] [address]  Phone: [phone]  Fax: [phone]    October 22, 2018        Dago Dangelo  [address] [facility] [address]          To whom it may concern:    RE: Dago Dangelo    Patient was seen and treated today at our clinic and missed work.  Patient may return to work 10/24/18.    Please contact me for questions or concerns.      Sincerely,        Tl Ortiz MD

## 2018-10-25 ENCOUNTER — MYC MEDICAL ADVICE (OUTPATIENT)
Dept: FAMILY MEDICINE | Facility: CLINIC | Age: 58
End: 2018-10-25

## 2018-10-25 NOTE — TELEPHONE ENCOUNTER
Letter started, but is this appropriate?    Please review SquaredOut message   Respond directly to pt if appropriate  Thi Garcia RN BS

## 2018-10-25 NOTE — LETTER
[address], [address]  [facility] [address]  Phone: [phone]  Fax: [phone]    10/25/18    Dago Dangelo  [address] [facility] [address]      To whom it may concern:     Please excuse Dgao Dangelo from work due to illness until 10/29/2018.    Sincerely,        Tl Ortiz MD

## 2018-10-26 NOTE — TELEPHONE ENCOUNTER
[facility]  [facility] and for .     Arsen Vance    10/26/18  10:29 AM

## 2018-11-18 DIAGNOSIS — I21.4 NON-STEMI (NON-ST ELEVATED MYOCARDIAL INFARCTION) (H): ICD-10-CM

## 2018-11-19 NOTE — TELEPHONE ENCOUNTER
"Requested Prescriptions   Pending Prescriptions Disp Refills     GOODSENSE ASPIRIN LOW DOSE 81 MG EC tablet [Pharmacy Med Name: GOODSENSE ASPIRIN LOW DOSE 81 TBEC] 100 tablet 2    Last Written Prescription Date:  2/20/18  Last Fill Quantity: 100,  # refills: 2   Last Office Visit: 10/22/2018 Ortiz       Return in about 1 week (around 10/29/2018), or if symptoms worsen or fail to improve.    Future Office Visit:      Sig: TAKE ONE TABLET BY MOUTH EVERY DAY    Analgesics (Non-Narcotic Tylenol and ASA Only) Passed    11/18/2018  6:47 PM       Passed - Recent (12 mo) or future (30 days) visit within the authorizing provider's specialty    Patient had office visit in the last 12 months or has a visit in the next 30 days with authorizing provider or within the authorizing provider's specialty.  See \"Patient Info\" tab in inbasket, or \"Choose Columns\" in Meds & Orders section of the refill encounter.             Passed - Patient is age 20 years or older    If ASA is flagged for ages under 20 years old. Forward to provider for confirmation Ryes Syndrome is not a concern.                "

## 2018-11-21 NOTE — TELEPHONE ENCOUNTER
Prescription approved per [facility] Refill Protocol  Thi Garcia RN BS

## 2018-11-30 ENCOUNTER — RADIANT APPOINTMENT (OUTPATIENT)
Dept: GENERAL RADIOLOGY | Facility: CLINIC | Age: 58
End: 2018-11-30
Attending: FAMILY MEDICINE
Payer: COMMERCIAL

## 2018-11-30 ENCOUNTER — OFFICE VISIT (OUTPATIENT)
Dept: FAMILY MEDICINE | Facility: CLINIC | Age: 58
End: 2018-11-30
Payer: COMMERCIAL

## 2018-11-30 VITALS
SYSTOLIC BLOOD PRESSURE: 130 MMHG | DIASTOLIC BLOOD PRESSURE: 86 MMHG | RESPIRATION RATE: 20 BRPM | WEIGHT: 196 LBS | OXYGEN SATURATION: 95 % | HEART RATE: 72 BPM | TEMPERATURE: 99.1 F | BODY MASS INDEX: 31.64 KG/M2

## 2018-11-30 DIAGNOSIS — R05.9 COUGH: ICD-10-CM

## 2018-11-30 DIAGNOSIS — J20.9 ACUTE BRONCHITIS, UNSPECIFIED ORGANISM: Primary | ICD-10-CM

## 2018-11-30 PROCEDURE — 99214 OFFICE O/P EST MOD 30 MIN: CPT | Performed by: FAMILY MEDICINE

## 2018-11-30 PROCEDURE — 71046 X-RAY EXAM CHEST 2 VIEWS: CPT | Mod: FY

## 2018-11-30 RX ORDER — AZITHROMYCIN 250 MG/1
TABLET, FILM COATED ORAL
Qty: 6 TABLET | Refills: 0 | Status: SHIPPED | OUTPATIENT
Start: 2018-11-30 | End: 2018-12-14

## 2018-11-30 RX ORDER — ALBUTEROL SULFATE 90 UG/1
2 AEROSOL, METERED RESPIRATORY (INHALATION) EVERY 6 HOURS PRN
Qty: 1 INHALER | Refills: 0 | Status: SHIPPED | OUTPATIENT
Start: 2018-11-30 | End: 2021-02-23

## 2018-11-30 ASSESSMENT — ENCOUNTER SYMPTOMS
GASTROINTESTINAL NEGATIVE: 1
WHEEZING: 1
CARDIOVASCULAR NEGATIVE: 1
SHORTNESS OF BREATH: 1
SPUTUM PRODUCTION: 1
FEVER: 0
SORE THROAT: 0
COUGH: 1

## 2018-11-30 NOTE — NURSING NOTE
"Chief Complaint   Patient presents with     Pharyngitis     Cough     Initial /86 (BP Location: Right arm, Patient Position: Sitting, Cuff Size: Adult Regular)  Pulse 72  Temp 99.1  F (37.3  C) (Oral)  Resp 20  Wt 196 lb (88.9 kg)  SpO2 95%  BMI 31.64 kg/m2 Estimated body mass index is 31.64 kg/(m^2) as calculated from the following:    Height as of 2/9/18: 5' 6\" (1.676 m).    Weight as of this encounter: 196 lb (88.9 kg).  BP completed using cuff size regular right arm    Lisa Magill, CMA    "

## 2018-11-30 NOTE — MR AVS SNAPSHOT
After Visit Summary   11/30/2018    Dago Dangelo    MRN: [id number]           Patient Information     Date Of Birth          1960        Visit Information        Provider Department      11/30/2018 11:20 AM Tl Ortiz MD [facility]        Today's Diagnoses     Acute bronchitis, unspecified organism    -  1    Cough           Follow-ups after your visit        Follow-up notes from your care team     Return in about 2 weeks (around 12/14/2018) for recheck.      Who to contact     If you have questions or need follow up information about today's clinic visit or your schedule please contact [facility] directly at [phone].  Normal or non-critical lab and imaging results will be communicated to you by MyChart, letter or phone within 4 business days after the clinic has received the results. If you do not hear from us within 7 days, please contact the clinic through GetOutfittedhart or phone. If you have a critical or abnormal lab result, we will notify you by phone as soon as possible.  Submit refill requests through 1jiajie or call your pharmacy and they will forward the refill request to us. Please allow 3 business days for your refill to be completed.          Additional Information About Your Visit        MyChart Information     1jiajie gives you secure access to your electronic health record. If you see a primary care provider, you can also send messages to your care team and make appointments. If you have questions, please call your primary care clinic.  If you do not have a primary care provider, please call [phone] and they will assist you.        Care EveryWhere ID     This is your Care EveryWhere ID. This could be used by other organizations to access your [place] medical records  [id number]        Your Vitals Were     Pulse Temperature Respirations Pulse Oximetry BMI (Body Mass Index)       72 99.1  F (37.3  C) (Oral) 20 95% 31.64 kg/m2         Blood Pressure from Last 3 Encounters:   11/30/18 130/86   10/22/18 146/86   08/17/18 136/78    Weight from Last 3 Encounters:   11/30/18 196 lb (88.9 kg)   10/22/18 196 lb (88.9 kg)   08/17/18 205 lb (93 kg)                 Today's Medication Changes          These changes are accurate as of 11/30/18 12:14 PM.  If you have any questions, ask your nurse or doctor.               Start taking these medicines.        Dose/Directions    albuterol 108 (90 Base) MCG/ACT inhaler   Commonly known as:  PROAIR HFA/PROVENTIL HFA/VENTOLIN HFA   Used for:  Acute bronchitis, unspecified organism   Started by:  Tl Ortiz MD        Dose:  2 puff   Inhale 2 puffs into the lungs every 6 hours as needed for shortness of breath / dyspnea or wheezing   Quantity:  1 Inhaler   Refills:  0       azithromycin 250 MG tablet   Commonly known as:  ZITHROMAX   Used for:  Acute bronchitis, unspecified organism   Started by:  Tl Ortiz MD        Two tablets first day, then one tablet daily for four days.   Quantity:  6 tablet   Refills:  0       OPTICHAMBER ADVANTAGE Misc   Used for:  Acute bronchitis, unspecified organism   Started by:  Tl Ortiz MD        Dose:  1 Device   1 Device as needed   Quantity:  1 each   Refills:  0         These medicines have changed or have updated prescriptions.        Dose/Directions    GOODSENSE ASPIRIN LOW DOSE 81 MG EC tablet   This may have changed:  Another medication with the same name was removed. Continue taking this medication, and follow the directions you see here.   Used for:  Non-STEMI (non-ST elevated myocardial infarction) (H)   Generic drug:  aspirin   Changed by:  Tl Ortiz MD        TAKE ONE TABLET BY MOUTH EVERY DAY   Quantity:  100 tablet   Refills:  2            Where to get your medicines      These medications were sent to [facility] - [address]  [address] [address]      Phone:  [phone]     albuterol 108 (90 Base) MCG/ACT inhaler    azithromycin 250 MG tablet    [facility]                Primary Care Provider Office Phone # Fax #    Tl Jimmie Ortiz [phone] [address]  [facility] [address]        Equal Access to Services     CRISTIAN LYNN : Hadii aad ku hadasho Soomaali, waaxda luqadaha, qaybta kaalmada adeegyada, waxay idiin hayaan adeeg kharash la'aan ah. So [facility] [phone].    ATENCIÓN: Si habla español, tiene a alejandre disposición servicios gratuitos de asistencia lingüística. Llame al [phone].    We comply with applicable federal civil rights laws and [place] laws. We do not discriminate on the basis of race, color, national origin, age, disability, sex, sexual orientation, or gender identity.            Thank you!     Thank you for choosing [facility]  for your care. Our goal is always to provide you with excellent care. Hearing back from our patients is one way we can continue to improve our services. Please take a few minutes to complete the written survey that you may receive in the mail after your visit with us. Thank you!             Your Updated Medication List - Protect others around you: Learn how to safely use, store and throw away your medicines at www.disposemymeds.org.          This list is accurate as of 11/30/18 12:14 PM.  Always use your most recent med list.                   Brand Name Dispense Instructions for use Diagnosis    * ACCU-CHEK COMPLETE Kit     1 Box    1 Box 2 times daily Measure blood sugar 2x daily    Type 2 diabetes mellitus without complication (H)       * blood glucose monitoring meter device kit    NO BRAND SPECIFIED    1 kit    Use to test blood sugar 2 times daily or as directed. PT HAS NEW PLAN COVERAGE EFFECTIVE 9/1/16, COVERS CONTOUR PRODUCTS    Type 2 diabetes mellitus without complication (H)       albuterol 108 (90 Base) MCG/ACT inhaler    PROAIR  HFA/PROVENTIL HFA/VENTOLIN HFA    1 Inhaler    Inhale 2 puffs into the lungs every 6 hours as needed for shortness of breath / dyspnea or wheezing    Acute bronchitis, unspecified organism       amLODIPine 5 MG tablet    NORVASC    180 tablet    TAKE TWO TABLETS BY MOUTH EVERY DAY    Non-STEMI (non-ST elevated myocardial infarction) (H), Benign essential hypertension       atorvastatin 40 MG tablet    LIPITOR    90 tablet    Take 1 tablet (40 mg) by mouth daily    Non-STEMI (non-ST elevated myocardial infarction) (H)       azithromycin 250 MG tablet    ZITHROMAX    6 tablet    Two tablets first day, then one tablet daily for four days.    Acute bronchitis, unspecified organism       blood glucose calibration solution    NO BRAND SPECIFIED    1 each    Use to calibrate blood glucose monitor as directed.    Type 2 diabetes mellitus without complication (H)       blood glucose lancets standard    NO BRAND SPECIFIED    100 each    Use to test blood sugar 2 times daily or as directed.    Type 2 diabetes mellitus without complication, without long-term current use of insulin (H)       blood glucose monitoring test strip    NO BRAND SPECIFIED    3 Box    Use to test blood sugars 2 times daily or as directed-DISPENSE PER PT INSURANCE PER PT PREFERENCE, EFFECTIVE 9/1/16 PT HAS INSURANCE CHANGE TO [employer]ODUCTS    Type 2 diabetes mellitus without complication (H)       carvedilol 25 MG tablet    COREG    180 tablet    TAKE ONE TABLET BY MOUTH TWICE A DAY WITH MEALS    Non-STEMI (non-ST elevated myocardial infarction) (H)       gemfibrozil 600 MG tablet    LOPID    60 tablet    Take 1 tablet (600 mg) by mouth 2 times daily    Hypertriglyceridemia       GOODSENSE ASPIRIN LOW DOSE 81 MG EC tablet   Generic drug:  aspirin     100 tablet    TAKE ONE TABLET BY MOUTH EVERY DAY    Non-STEMI (non-ST elevated myocardial infarction) (H)       losartan 100 MG tablet    COZAAR    90 tablet    Take 1 tablet (100 mg) by mouth daily     Benign essential hypertension       metFORMIN 500 MG tablet    GLUCOPHAGE    360 tablet    Take 2 tablets (1,000 mg) by mouth 2 times daily (with meals)    Type 2 diabetes mellitus without complication, without long-term current use of insulin (H)       multivitamin w/minerals tablet      Take 1 tablet by mouth daily        nitroGLYcerin 0.4 MG sublingual tablet    NITROSTAT    25 tablet    Place 1 tablet (0.4 mg) under the tongue every 5 minutes as needed for chest pain if you are still having symptoms after 3 doses (15 minutes) call 911.    Non-STEMI (non-ST elevated myocardial infarction) (H)       OPTICHAMBER ADVANTAGE Misc     1 each    1 Device as needed    Acute bronchitis, unspecified organism       order for DME     1 Device    Blood pressure cuff    Benign essential hypertension       ticagrelor 90 MG tablet    BRILINTA    180 tablet    Take 1 tablet (90 mg) by mouth every 12 hours    Non-STEMI (non-ST elevated myocardial infarction) (H)       * Notice:  This list has 2 medication(s) that are the same as other medications prescribed for you. Read the directions carefully, and ask your doctor or other care provider to review them with you.

## 2018-11-30 NOTE — PROGRESS NOTES
HPI   SUBJECTIVE:   Dago Dangelo is a 58 year old male who presents to clinic today for the following health issues:    RESPIRATORY SYMPTOMS      Duration: has been at least 2 months per patient.      Description  sore throat, cough, wheezing and fatigue/malaise    Severity: moderate    Accompanying signs and symptoms: None    History (predisposing factors):  tobacco abuse    Precipitating or alleviating factors: amoxicillin helped for a little bit.     Therapies tried and outcome:  Amoxicillin helped symptoms for a little bit.  OTC cold and flu meds.  Nite quill and alkaselzer plus    Still feeling ill since being seen late October, did feel better but hasn't gone totally away.  Able to go back to work.  Not feeling terribly ill right now, but lots of coughing.  Some wheezing sometimes preceeding cough.  MInor nasal congestion.        Review of Systems   Constitutional: Negative for fever.   HENT: Positive for congestion. Negative for sore throat.    Respiratory: Positive for cough, sputum production, shortness of breath and wheezing.    Cardiovascular: Negative.    Gastrointestinal: Negative.          Physical Exam   Constitutional: He is well-developed, well-nourished, and in no distress. No distress.   HENT:   Right Ear: Tympanic membrane, external ear and ear canal normal.   Left Ear: Tympanic membrane, external ear and ear canal normal.   Mouth/Throat: Oropharynx is clear and moist. No oropharyngeal exudate.   Eyes: Conjunctivae are normal.   Neck: Neck supple.   Cardiovascular: Normal rate, regular rhythm and normal heart sounds.    Pulmonary/Chest: Effort normal and breath sounds normal.   Subtle scattered rhonchi   Lymphadenopathy:     He has no cervical adenopathy.   Skin: Skin is warm and dry. No rash noted.   Nursing note and vitals reviewed.      (J20.9) Acute bronchitis, unspecified organism  (primary encounter diagnosis)  Comment: normal XR, worried that he may be developing COPD as is a life-long  smoker.  If not impriving after 2w with check PFTs  Plan: albuterol (PROAIR HFA/PROVENTIL HFA/VENTOLIN         HFA) 108 (90 Base) MCG/ACT inhaler,         azithromycin (ZITHROMAX) 250 MG tablet,         Spacer/Aero-Holding Chambers (OPTICHAMBER         ADVANTAGE) MISC            (R05) Cough  Comment:   Plan: XR Chest 2 Views              RTC in 2w    Tl Ortiz MD

## 2018-11-30 NOTE — LETTER
[address], [address]  [facility] [address]  Phone: [phone]  Fax: [phone]    November 30, 2018        Dago Dangelo  [address] [facility] [address]          To whom it may concern:    RE: Dago Dangelo    Patient was seen and treated today at our clinic.  Patient may return to work 11/30/18 with the following:  No working or lifting restrictions    Please contact me for questions or concerns.      Sincerely,        Tl Ortiz MD

## 2018-12-14 ENCOUNTER — OFFICE VISIT (OUTPATIENT)
Dept: FAMILY MEDICINE | Facility: CLINIC | Age: 58
End: 2018-12-14
Payer: COMMERCIAL

## 2018-12-14 VITALS
WEIGHT: 199.6 LBS | OXYGEN SATURATION: 97 % | TEMPERATURE: 98.6 F | RESPIRATION RATE: 20 BRPM | HEART RATE: 68 BPM | DIASTOLIC BLOOD PRESSURE: 78 MMHG | SYSTOLIC BLOOD PRESSURE: 130 MMHG | BODY MASS INDEX: 32.22 KG/M2

## 2018-12-14 DIAGNOSIS — R05.9 COUGH: Primary | ICD-10-CM

## 2018-12-14 DIAGNOSIS — F17.200 TOBACCO USE DISORDER: ICD-10-CM

## 2018-12-14 PROCEDURE — 99213 OFFICE O/P EST LOW 20 MIN: CPT | Performed by: FAMILY MEDICINE

## 2018-12-14 ASSESSMENT — ENCOUNTER SYMPTOMS
COUGH: 1
SHORTNESS OF BREATH: 0
PALPITATIONS: 0
WHEEZING: 0
SPUTUM PRODUCTION: 1
GASTROINTESTINAL NEGATIVE: 1
CONSTITUTIONAL NEGATIVE: 1

## 2018-12-14 NOTE — NURSING NOTE
"Chief Complaint   Patient presents with     Cough     FOLLOW UP      Initial /78 (BP Location: Right arm, Patient Position: Sitting, Cuff Size: Adult Regular)   Pulse 68   Temp 98.6  F (37  C) (Oral)   Resp 20   Wt 90.5 kg (199 lb 9.6 oz)   SpO2 97%   BMI 32.22 kg/m   Estimated body mass index is 32.22 kg/m  as calculated from the following:    Height as of 2/9/18: 1.676 m (5' 6\").    Weight as of this encounter: 90.5 kg (199 lb 9.6 oz).  BP completed using cuff size regular right arm    Lisa Magill, CMA    "

## 2018-12-14 NOTE — PROGRESS NOTES
SUBJECTIVE:   Dago Dangelo is a 58 year old male who presents to clinic today for the following health issues:    FOLLOW UP FOR RESPIRATORY ISSUES:

## 2018-12-14 NOTE — PROGRESS NOTES
HPI     Sore throat resolved with use of antibiotics.  Still coughing, but less severe, able to return to work.  No nga shortness of breath, or wheezing.  No fever.  Is using albuterol three times daily - but doesn't notice much difference    Review of Systems   Constitutional: Negative.    Respiratory: Positive for cough and sputum production. Negative for shortness of breath and wheezing.    Cardiovascular: Negative for chest pain and palpitations.   Gastrointestinal: Negative.          Physical Exam   Constitutional: He is oriented to person, place, and time.   Eyes: Conjunctivae and EOM are normal.   Cardiovascular: Normal rate, regular rhythm and normal heart sounds.   Pulmonary/Chest: Effort normal and breath sounds normal.   Musculoskeletal: He exhibits no edema.   Neurological: He is alert and oriented to person, place, and time.   Skin: Skin is warm and dry.   Vitals reviewed.    (R05) Cough  (primary encounter diagnosis)  Comment: unable to complete spirometry today as was not able to load race-based standards.  Will stop meds and observe for 1m.  If cough or breathing issues persist, will return for further eval  Plan: Spirometry, Breathing Capacity: Normal Order,         Clinic Performed            (F17.200) Tobacco use disorder  Comment:   Plan: STRONGLY encouraged to quit.      RTC in 1m    Tl Ortiz MD

## 2018-12-17 ENCOUNTER — MYC REFILL (OUTPATIENT)
Dept: FAMILY MEDICINE | Facility: CLINIC | Age: 58
End: 2018-12-17

## 2018-12-17 DIAGNOSIS — E78.1 HYPERTRIGLYCERIDEMIA: ICD-10-CM

## 2018-12-17 DIAGNOSIS — E11.9 TYPE 2 DIABETES MELLITUS WITHOUT COMPLICATION, WITHOUT LONG-TERM CURRENT USE OF INSULIN (H): ICD-10-CM

## 2018-12-17 DIAGNOSIS — I21.4 NON-STEMI (NON-ST ELEVATED MYOCARDIAL INFARCTION) (H): ICD-10-CM

## 2018-12-17 DIAGNOSIS — I10 BENIGN ESSENTIAL HYPERTENSION: ICD-10-CM

## 2018-12-17 RX ORDER — AMLODIPINE BESYLATE 5 MG/1
TABLET ORAL
Qty: 180 TABLET | Refills: 1 | Status: CANCELLED | OUTPATIENT
Start: 2018-12-17

## 2018-12-17 RX ORDER — LOSARTAN POTASSIUM 100 MG/1
100 TABLET ORAL DAILY
Qty: 90 TABLET | Refills: 1 | Status: CANCELLED | OUTPATIENT
Start: 2018-12-17

## 2018-12-18 RX ORDER — GEMFIBROZIL 600 MG/1
600 TABLET, FILM COATED ORAL 2 TIMES DAILY
Qty: 60 TABLET | Refills: 3 | Status: SHIPPED | OUTPATIENT
Start: 2018-12-18 | End: 2021-01-22

## 2018-12-18 NOTE — TELEPHONE ENCOUNTER
"Requested Prescriptions   Pending Prescriptions Disp Refills     amLODIPine (NORVASC) 5 MG tablet  Last Written Prescription Date:  9/19/18  Last Fill Quantity: 180,  # refills: 1   Last Office Visit: 12/14/2018 Ortiz      Return in about 1 month (around 1/14/2019).     Future Office Visit:      180 tablet 1    Calcium Channel Blockers Protocol  Passed - 12/18/2018 10:27 AM       Passed - Blood pressure under 140/90 in past 12 months    BP Readings from Last 3 Encounters:   12/14/18 130/78   11/30/18 130/86   10/22/18 146/86                Passed - Recent (12 mo) or future (30 days) visit within the authorizing provider's specialty    Patient had office visit in the last 12 months or has a visit in the next 30 days with authorizing provider or within the authorizing provider's specialty.  See \"Patient Info\" tab in inbasket, or \"Choose Columns\" in Meds & Orders section of the refill encounter.             Passed - Patient is age 18 or older       Passed - Normal serum creatinine on file in past 12 months    Recent Labs   Lab Test 08/17/18  0815   CR 0.76        _________________________________________________________________________________________________________________________       aspirin (GOODSENSE ASPIRIN LOW DOSE) 81 MG EC tablet 100 tablet 2    Last Written Prescription Date:  11/21/18  Last Fill Quantity: 100,  # refills: 2   Last Office Visit: 12/14/2018   Future Office Visit:      Sig: Take 1 tablet (81 mg) by mouth daily    Analgesics (Non-Narcotic Tylenol and ASA Only) Passed - 12/18/2018 10:27 AM       Passed - Recent (12 mo) or future (30 days) visit within the authorizing provider's specialty    Patient had office visit in the last 12 months or has a visit in the next 30 days with authorizing provider or within the authorizing provider's specialty.  See \"Patient Info\" tab in inbasket, or \"Choose Columns\" in Meds & Orders section of the refill encounter.             Passed - Patient is age 20 years " or older    If ASA is flagged for ages under 20 years old. Forward to provider for confirmation Ryes Syndrome is not a concern.

## 2018-12-18 NOTE — TELEPHONE ENCOUNTER
"Requested Prescriptions   Pending Prescriptions Disp Refills     gemfibrozil (LOPID) 600 MG tablet 60 tablet 3    Last Written Prescription Date:  8/17/18  Last Fill Quantity: 60,  # refills: 3   Last Office Visit: 12/14/2018 Ortiz      Return in about 1 month (around 1/14/2019).     Future Office Visit:      Sig: Take 1 tablet (600 mg) by mouth 2 times daily    Fibrates Passed - 12/18/2018 10:30 AM       Passed - Lipid panel on file in past 12 months    Recent Labs   Lab Test 08/17/18  0815   CHOL 149   TRIG 379*   HDL 27*   LDL 46   NHDL 122              Passed - No abnormal creatine kinase in past 12 months    No lab results found.            Passed - Recent (12 mo) or future (30 days) visit within the authorizing provider's specialty    Patient had office visit in the last 12 months or has a visit in the next 30 days with authorizing provider or within the authorizing provider's specialty.  See \"Patient Info\" tab in inbasket, or \"Choose Columns\" in Meds & Orders section of the refill encounter.             Passed - Patient is age 18 or older   _________________________________________________________________________________________________________________________       losartan (COZAAR) 100 MG tablet 90 tablet 1    Last Written Prescription Date:  8/17/18  Last Fill Quantity: 90,  # refills: 1   Last Office Visit: 12/14/2018   Future Office Visit:      Sig: Take 1 tablet (100 mg) by mouth daily    Angiotensin-II Receptors Passed - 12/18/2018 10:30 AM       Passed - Blood pressure under 140/90 in past 12 months    BP Readings from Last 3 Encounters:   12/14/18 130/78   11/30/18 130/86   10/22/18 146/86                Passed - Recent (12 mo) or future (30 days) visit within the authorizing provider's specialty    Patient had office visit in the last 12 months or has a visit in the next 30 days with authorizing provider or within the authorizing provider's specialty.  See \"Patient Info\" tab in inbasket, or \"Choose " "Columns\" in Meds & Orders section of the refill encounter.             Passed - Patient is age 18 or older       Passed - Normal serum creatinine on file in past 12 months    Recent Labs   Lab Test 08/17/18 0815   CR 0.76            Passed - Normal serum potassium on file in past 12 months    Recent Labs   Lab Test 08/17/18 0815   POTASSIUM 4.6               _________________________________________________________________________________________________________________________       metFORMIN (GLUCOPHAGE) 500 MG tablet 360 tablet 1    Last Written Prescription Date:  8/17/18  Last Fill Quantity: 360,  # refills: 1   Last Office Visit: 12/14/2018   Future Office Visit:      Sig: Take 2 tablets (1,000 mg) by mouth 2 times daily (with meals)    Biguanide Agents Passed - 12/18/2018 10:30 AM       Passed - Blood pressure less than 140/90 in past 6 months    BP Readings from Last 3 Encounters:   12/14/18 130/78   11/30/18 130/86   10/22/18 146/86                Passed - Patient has documented LDL within the past 12 mos.    Recent Labs   Lab Test 08/17/18 0815   LDL 46            Passed - Patient has had a Microalbumin in the past 15 mos.    Recent Labs   Lab Test 02/20/18  1202   MICROL 69   UMALCR 65.09*            Passed - Patient is age 10 or older       Passed - Patient has documented A1c within the specified period of time.    If HgbA1C is 8 or greater, it needs to be on file within the past 3 months.  If less than 8, must be on file within the past 6 months.     Recent Labs   Lab Test 08/17/18 0815   A1C 7.4*            Passed - Patient's CR is NOT>1.4 OR Patient's EGFR is NOT<45 within past 12 mos.    Recent Labs   Lab Test 08/17/18 0815   GFRESTIMATED >90   GFRESTBLACK >90       Recent Labs   Lab Test 08/17/18  0815   CR 0.76            Passed - Patient does NOT have a diagnosis of CHF.       Passed - Recent (6 mo) or future (30 days) visit within the authorizing provider's specialty    Patient had office " "visit in the last 6 months or has a visit in the next 30 days with authorizing provider or within the authorizing provider's specialty.  See \"Patient Info\" tab in inbasket, or \"Choose Columns\" in Meds & Orders section of the refill encounter.            "

## 2019-01-09 ENCOUNTER — OFFICE VISIT (OUTPATIENT)
Dept: FAMILY MEDICINE | Facility: CLINIC | Age: 59
End: 2019-01-09
Payer: COMMERCIAL

## 2019-01-09 VITALS
SYSTOLIC BLOOD PRESSURE: 128 MMHG | RESPIRATION RATE: 20 BRPM | HEIGHT: 66 IN | DIASTOLIC BLOOD PRESSURE: 84 MMHG | BODY MASS INDEX: 31.66 KG/M2 | WEIGHT: 197 LBS | TEMPERATURE: 98.9 F | HEART RATE: 77 BPM | OXYGEN SATURATION: 97 %

## 2019-01-09 DIAGNOSIS — J06.9 VIRAL URI WITH COUGH: Primary | ICD-10-CM

## 2019-01-09 DIAGNOSIS — E11.9 TYPE 2 DIABETES MELLITUS WITHOUT COMPLICATION, WITHOUT LONG-TERM CURRENT USE OF INSULIN (H): ICD-10-CM

## 2019-01-09 PROCEDURE — 99213 OFFICE O/P EST LOW 20 MIN: CPT | Performed by: NURSE PRACTITIONER

## 2019-01-09 ASSESSMENT — MIFFLIN-ST. JEOR: SCORE: 1651.34

## 2019-01-09 NOTE — PROGRESS NOTES
SUBJECTIVE:   Dago Dangelo is a 58 year old male who presents to clinic today for the following health issues:      Acute Illness   Acute illness concerns: cold  Onset: x 4 days    Fever: no     Chills/Sweats: no    Headache (location?): YES- forehead    Sinus Pressure:YES- post-nasal drainage    Conjunctivitis:  no    Ear Pain: no    Rhinorrhea: YES    Congestion: YES    Sore Throat: YES- minor     Cough: YES-non-productive, productive of clear sputum    Wheeze: no     Decreased Appetite: no     Nausea: no     Vomiting: no     Diarrhea:  no     Dysuria/Freq.: no     Fatigue/Achiness: YES- tired    Sick/Strep Exposure: YES- granddaughter had a cold     Therapies Tried and outcome: OTC cold and flu    Battled cough/cold crud in November, but that was resolved.  New illness started 4 days ago.  Was around granddaughter who had similar symptoms.  No SOB or wheezing.  Continues to smoke, not ready to quit.       Problem list and histories reviewed & adjusted, as indicated.  Additional history: as documented    Current Outpatient Medications   Medication Sig Dispense Refill     amLODIPine (NORVASC) 5 MG tablet TAKE TWO TABLETS BY MOUTH EVERY  tablet 1     atorvastatin (LIPITOR) 40 MG tablet TAKE ONE TABLET BY MOUTH EVERY DAY 90 tablet 1     blood glucose (NO BRAND SPECIFIED) lancets standard Use to test blood sugar 2 times daily or as directed. 100 each 11     blood glucose calibration (NO BRAND SPECIFIED) solution Use to calibrate blood glucose monitor as directed. 1 each 0     blood glucose monitoring (NO BRAND SPECIFIED) test strip Use to test blood sugars 2 times daily or as directed-DISPENSE PER PT INSURANCE PER PT PREFERENCE, EFFECTIVE 9/1/16 PT HAS INSURANCE CHANGE TO [employer] 3 Box 0     Blood Glucose Monitoring Suppl (ACCU-CHEK COMPLETE) KIT 1 Box 2 times daily Measure blood sugar 2x daily 1 Box 0     carvedilol (COREG) 25 MG tablet TAKE ONE TABLET BY MOUTH TWICE A DAY WITH MEALS 180 tablet  2     gemfibrozil (LOPID) 600 MG tablet Take 1 tablet (600 mg) by mouth 2 times daily 60 tablet 3     GOODSENSE ASPIRIN LOW DOSE 81 MG EC tablet TAKE ONE TABLET BY MOUTH EVERY  tablet 2     losartan (COZAAR) 100 MG tablet Take 1 tablet (100 mg) by mouth daily 90 tablet 1     metFORMIN (GLUCOPHAGE) 500 MG tablet Take 2 tablets (1,000 mg) by mouth 2 times daily (with meals) 360 tablet 1     multivitamin, therapeutic with minerals (THERA-VIT-M) TABS Take 1 tablet by mouth daily       order for DME Blood pressure cuff 1 Device 0     ticagrelor (BRILINTA) 90 MG tablet Take 1 tablet (90 mg) by mouth every 12 hours 180 tablet 3     albuterol (PROAIR HFA/PROVENTIL HFA/VENTOLIN HFA) 108 (90 Base) MCG/ACT inhaler Inhale 2 puffs into the lungs every 6 hours as needed for shortness of breath / dyspnea or wheezing (Patient not taking: Reported on 1/9/2019) 1 Inhaler 0     blood glucose monitoring (NO BRAND SPECIFIED) meter device kit Use to test blood sugar 2 times daily or as directed.  PT HAS NEW PLAN COVERAGE EFFECTIVE 9/1/16, COVERS CONTOUR PRODUCTS 1 kit 0     nitroGLYcerin (NITROSTAT) 0.4 MG sublingual tablet Place 1 tablet (0.4 mg) under the tongue every 5 minutes as needed for chest pain if you are still having symptoms after 3 doses (15 minutes) call 911. (Patient not taking: Reported on 12/14/2018) 25 tablet 1     Spacer/Aero-Holding Chambers (OPTICHAMBER ADVANTAGE) MISC 1 Device as needed (Patient not taking: Reported on 1/9/2019) 1 each 0     No Known Allergies    Reviewed and updated as needed this visit by clinical staff  Tobacco  Allergies  Meds  Med Hx  Surg Hx  Fam Hx  Soc Hx      Reviewed and updated as needed this visit by Provider         ROS:  Constitutional, HEENT, cardiovascular, pulmonary, gi and gu systems are negative, except as otherwise noted.    OBJECTIVE:     /84 (BP Location: Right arm, Patient Position: Sitting, Cuff Size: Adult Regular)   Pulse 77   Temp 98.9  F (37.2  C)  "(Oral)   Resp 20   Ht 1.676 m (5' 6\")   Wt 89.4 kg (197 lb)   SpO2 97%   BMI 31.80 kg/m    Body mass index is 31.8 kg/m .  GENERAL: healthy, alert and no distress  EYES: Eyes grossly normal to inspection and conjunctivae and sclerae normal  HENT: ear canals and TM's normal, nose and mouth without ulcers or lesions  NECK: no adenopathy, no asymmetry, masses, or scars and thyroid normal to palpation  RESP: lungs clear to auscultation - no rales, rhonchi or wheezes, normal effort  CV: regular rate and rhythm, normal S1 S2, no S3 or S4, no murmur, click or rub    Diagnostic Test Results:  none     ASSESSMENT/PLAN:   1. Viral URI with cough  Continue supportive cares; rest, fluids, lozenges, OTC expectorant.  Note given for work.  If no improvement over the next week or any worsening should be seen for follow up.    2. Type 2 diabetes mellitus without complication, without long-term current use of insulin (H)  Due for follow up next month.           OCTAVIO Lockwood [facility]    "

## 2019-01-09 NOTE — PATIENT INSTRUCTIONS

## 2019-01-11 ENCOUNTER — MYC MEDICAL ADVICE (OUTPATIENT)
Dept: FAMILY MEDICINE | Facility: CLINIC | Age: 59
End: 2019-01-11

## 2019-01-11 NOTE — TELEPHONE ENCOUNTER
Letter given to excuse him today.  Should be at work when he is scheduled next after today.  Should not need more time off for viral illness.    Marleny Olvera CNP

## 2019-01-11 NOTE — TELEPHONE ENCOUNTER
Message given to patient via [employer].    Letter placed up front for patient to .    Brunilda Mansfield RN

## 2019-01-11 NOTE — LETTER
[address], [address]  [facility] [address]  Phone: [phone]  Fax: [phone]    January 11, 2019        Dago Dangelo  [address] [facility] [address]          To whom it may concern:    RE: Dago Dangelo    Please excuse Dago from work on 1/11/2019.        Sincerely,        OCTAVIO Lockwood CNP

## 2019-03-18 DIAGNOSIS — I10 BENIGN ESSENTIAL HYPERTENSION: ICD-10-CM

## 2019-03-18 DIAGNOSIS — I21.4 NON-STEMI (NON-ST ELEVATED MYOCARDIAL INFARCTION) (H): ICD-10-CM

## 2019-03-18 NOTE — TELEPHONE ENCOUNTER
"Requested Prescriptions   Pending Prescriptions Disp Refills     amLODIPine (NORVASC) 5 MG tablet [Pharmacy Med Name: AMLODIPINE BESYLATE 5MG TABS] 180 tablet 1    Last Written Prescription Date:  09/19/2018  Last Fill Quantity: 180 TABLET,  # refills: 1   Last office visit: 1/9/2019 with prescribing provider:  01/09/2019   Future Office Visit:     Sig: TAKE TWO TABLETS BY MOUTH EVERY DAY    Calcium Channel Blockers Protocol  Passed - 3/18/2019  5:18 AM       Passed - Blood pressure under 140/90 in past 12 months    BP Readings from Last 3 Encounters:   01/09/19 128/84   12/14/18 130/78   11/30/18 130/86                Passed - Recent (12 mo) or future (30 days) visit within the authorizing provider's specialty    Patient had office visit in the last 12 months or has a visit in the next 30 days with authorizing provider or within the authorizing provider's specialty.  See \"Patient Info\" tab in inbasket, or \"Choose Columns\" in Meds & Orders section of the refill encounter.             Passed - Medication is active on med list       Passed - Patient is age 18 or older       Passed - Normal serum creatinine on file in past 12 months    Recent Labs   Lab Test 08/17/18  0815   CR 0.76             Keven HOLT  "

## 2019-03-18 NOTE — LETTER
[address], [address]  [facility] [address]  Phone: [phone]  Fax: [phone]    03/19/19    Abimael Miller  [address] [facility] [address]      Dear Dago:     We recently received a call from your pharmacy requesting a refill of your medication.    A review of your chart indicates that an appointment is required with your provider for office visit and labs to monitor BP and diabetes. Please call the clinic at [phone] to schedule your appointment.    We have authorized one refill of your medication to allow time for you to schedule your appointment.    Taking care of your health is important to us, and ongoing visits with your provider are vital to your care.  We look forward to seeing you in the near future.          Sincerely,      Tl Ortiz MD/Thi PRICE RN

## 2019-03-19 RX ORDER — AMLODIPINE BESYLATE 5 MG/1
10 TABLET ORAL DAILY
Qty: 60 TABLET | Refills: 0 | Status: SHIPPED | OUTPATIENT
Start: 2019-03-19 | End: 2021-02-23

## 2019-03-19 NOTE — TELEPHONE ENCOUNTER
OV 1.9.19 Type 2 diabetes mellitus without complication, without long-term current use of insulin (H)  Due for follow up next month.  Medication is being filled for 1 time refill only due to:  due for DM f/u, letter sent     Thi Garcia RN, BS  Clinical Nurse Triage.

## 2019-04-12 ENCOUNTER — TELEPHONE (OUTPATIENT)
Dept: FAMILY MEDICINE | Facility: CLINIC | Age: 59
End: 2019-04-12

## 2019-04-12 NOTE — TELEPHONE ENCOUNTER
Panel Management Review      Patient has the following on his problem list:     Diabetes    ASA: Failed    Last A1C  Lab Results   Component Value Date    A1C 7.4 08/17/2018    A1C 7.6 02/20/2018    A1C 6.7 07/07/2017    A1C 6.8 01/24/2017    A1C 6.5 09/21/2016     A1C tested: FAILED    Last LDL:    Lab Results   Component Value Date    CHOL 149 08/17/2018     Lab Results   Component Value Date    HDL 27 08/17/2018     Lab Results   Component Value Date    LDL 46 08/17/2018     Lab Results   Component Value Date    TRIG 379 08/17/2018     No results found for: CHOLHDLRATIO  Lab Results   Component Value Date    NHDL 122 08/17/2018       Is the patient on a Statin? YES             Is the patient on Aspirin? NO    Medications     HMG CoA Reductase Inhibitors     atorvastatin (LIPITOR) 40 MG tablet       Salicylates     GOODSENSE ASPIRIN LOW DOSE 81 MG EC tablet        GOODSENSE ASPIRIN LOW DOSE 81 MG EC tablet             Last three blood pressure readings:  BP Readings from Last 3 Encounters:   01/09/19 128/84   12/14/18 130/78   11/30/18 130/86       Date of last diabetes office visit: 8/17/18     Tobacco History:     History   Smoking Status     Current Every Day Smoker     Packs/day: 1.00     Years: 30.00     Types: Cigarettes   Smokeless Tobacco     Never Used     Comment: may have one once weekly           Composite cancer screening  Chart review shows that this patient is due/due soon for the following None  Summary:    Patient is due/failing the following:   Microalbumin and A1C    Action needed:   Patient needs office visit for Diabetes & lab tests.    Type of outreach:    Sent Med fusion message.    Questions for provider review:    None                                                                                                                                    Yokasta Bhakta CMA ([facility])     Chart routed to Care Team.

## 2019-04-23 ENCOUNTER — MYC MEDICAL ADVICE (OUTPATIENT)
Dept: FAMILY MEDICINE | Facility: CLINIC | Age: 59
End: 2019-04-23

## 2019-04-23 NOTE — TELEPHONE ENCOUNTER
Is this something you would be willing to complete for this patient?  If so, would you need some type of appointment?  Phone visit?  E-visit?  Patient is currently without insurance.    Brunilda Mansfield RN

## 2019-04-25 NOTE — TELEPHONE ENCOUNTER
Returned call to patient.  He states that he doesn't think he's going to need to have the form filled out now.  If things change, he will call back to schedule a phone visit.  Yokasta Bhakta CMA ([facility])

## 2019-09-17 ENCOUNTER — TELEPHONE (OUTPATIENT)
Dept: FAMILY MEDICINE | Facility: CLINIC | Age: 59
End: 2019-09-17

## 2019-09-17 NOTE — TELEPHONE ENCOUNTER
Panel Management Review      Patient has the following on his problem list:     Diabetes    ASA: Failed    Last A1C  Lab Results   Component Value Date    A1C 7.4 08/17/2018    A1C 7.6 02/20/2018    A1C 6.7 07/07/2017    A1C 6.8 01/24/2017    A1C 6.5 09/21/2016     A1C tested: FAILED    Last LDL:    Lab Results   Component Value Date    CHOL 149 08/17/2018     Lab Results   Component Value Date    HDL 27 08/17/2018     Lab Results   Component Value Date    LDL 46 08/17/2018     Lab Results   Component Value Date    TRIG 379 08/17/2018     No results found for: CHOLHDLRATIO  Lab Results   Component Value Date    NHDL 122 08/17/2018       Is the patient on a Statin? YES             Is the patient on Aspirin? NO    Medications     HMG CoA Reductase Inhibitors     atorvastatin (LIPITOR) 40 MG tablet       Salicylates     GOODSENSE ASPIRIN LOW DOSE 81 MG EC tablet        GOODSENSE ASPIRIN LOW DOSE 81 MG EC tablet             Last three blood pressure readings:  BP Readings from Last 3 Encounters:   01/09/19 128/84   12/14/18 130/78   11/30/18 130/86       Date of last diabetes office visit: 01/09/19     Tobacco History:     History   Smoking Status     Current Every Day Smoker     Packs/day: 1.00     Years: 30.00     Types: Cigarettes   Smokeless Tobacco     Never Used     Comment: may have one once weekly         Hypertension   Last three blood pressure readings:  BP Readings from Last 3 Encounters:   01/09/19 128/84   12/14/18 130/78   11/30/18 130/86     Blood pressure: Passed    HTN Guidelines:  Less than 140/90      Composite cancer screening  Chart review shows that this patient is due/due soon for the following None  Summary:    Patient is due/failing the following:   Microalbumin, BMP, Lipid, Diabetic Foot Exam, Eye Exam and FOLLOW UP    Action needed:   Patient needs office visit for DIABETES MANAGEMENT.    Type of outreach:    Sent [facility] message.    Questions for provider review:    None                                                                                                                                     Lisa Magill, [facility]       Chart routed to Care Team .

## 2019-09-30 ENCOUNTER — HEALTH MAINTENANCE LETTER (OUTPATIENT)
Age: 59
End: 2019-09-30

## 2019-11-03 ENCOUNTER — HOSPITAL ENCOUNTER (EMERGENCY)
Facility: CLINIC | Age: 59
Discharge: HOME | End: 2019-11-03
Attending: EMERGENCY MEDICINE | Admitting: EMERGENCY MEDICINE

## 2019-11-03 ENCOUNTER — APPOINTMENT (OUTPATIENT)
Dept: GENERAL RADIOLOGY | Facility: CLINIC | Age: 59
End: 2019-11-03
Attending: EMERGENCY MEDICINE

## 2019-11-03 VITALS
TEMPERATURE: 98.9 F | BODY MASS INDEX: 33.32 KG/M2 | HEART RATE: 96 BPM | DIASTOLIC BLOOD PRESSURE: 51 MMHG | WEIGHT: 200 LBS | RESPIRATION RATE: 16 BRPM | OXYGEN SATURATION: 97 % | SYSTOLIC BLOOD PRESSURE: 138 MMHG | HEIGHT: 65 IN

## 2019-11-03 DIAGNOSIS — R07.89 CHEST WALL PAIN: ICD-10-CM
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
## 2019-11-03 PROCEDURE — 99283 EMERGENCY DEPT VISIT LOW MDM: CPT | Mod: 25

## 2019-11-03 PROCEDURE — 71101 X-RAY EXAM UNILAT RIBS/CHEST: CPT | Mod: RT

## 2019-11-03 PROCEDURE — 25000132 ZZH RX MED GY IP 250 OP 250 PS 637: Performed by: EMERGENCY MEDICINE

## 2019-11-03 RX ORDER — DIAZEPAM 5 MG
5 TABLET ORAL ONCE
Status: COMPLETED | OUTPATIENT
Start: 2019-11-03 | End: 2019-11-03

## 2019-11-03 RX ORDER — HYDROCODONE BITARTRATE AND ACETAMINOPHEN 5; 325 MG/1; MG/1
1 TABLET ORAL EVERY 6 HOURS PRN
Qty: 10 TABLET | Refills: 0 | Status: SHIPPED | OUTPATIENT
Start: 2019-11-03 | End: 2021-01-22

## 2019-11-03 RX ORDER — IBUPROFEN 600 MG/1
600 TABLET, FILM COATED ORAL ONCE
Status: COMPLETED | OUTPATIENT
Start: 2019-11-03 | End: 2019-11-03

## 2019-11-03 RX ORDER — DIAZEPAM 5 MG
5 TABLET ORAL EVERY 6 HOURS PRN
Qty: 15 TABLET | Refills: 0 | Status: SHIPPED | OUTPATIENT
Start: 2019-11-03 | End: 2021-01-22

## 2019-11-03 RX ORDER — IBUPROFEN 600 MG/1
600 TABLET, FILM COATED ORAL EVERY 6 HOURS PRN
Qty: 20 TABLET | Refills: 0 | Status: SHIPPED | OUTPATIENT
Start: 2019-11-03

## 2019-11-03 RX ADMIN — DIAZEPAM 5 MG: 5 TABLET ORAL at 18:43

## 2019-11-03 RX ADMIN — IBUPROFEN 600 MG: 600 TABLET, FILM COATED ORAL at 18:43

## 2019-11-03 ASSESSMENT — ENCOUNTER SYMPTOMS
VOMITING: 0
LIGHT-HEADEDNESS: 0
NAUSEA: 0
HEADACHES: 0
SHORTNESS OF BREATH: 0

## 2019-11-03 ASSESSMENT — MIFFLIN-ST. JEOR: SCORE: 1649.07

## 2019-11-03 NOTE — ED AVS SNAPSHOT
[facility]  [address]  [facility] [address]  Phone:  [phone]  Fax:  [phone]                                    Dago Dangelo   MRN: [id number]    Department:  [facility]   Date of Visit:  11/3/2019           After Visit Summary Signature Page    I have received my discharge instructions, and my questions have been answered. I have discussed any challenges I see with this plan with the nurse or doctor.    ..........................................................................................................................................  Patient/Patient Representative Signature      ..........................................................................................................................................  Patient Representative Print Name and Relationship to Patient    ..................................................               ................................................  Date                                   Time    ..........................................................................................................................................  Reviewed by Signature/Title    ...................................................              ..............................................  Date                                               Time          22EPIC Rev 08/18

## 2019-11-04 NOTE — ED TRIAGE NOTES
Pt arrives with sister for mechanical fall  ~2hrs ago.  Pt states he had a few beers, tripped and hit his R chest on the corner of an end table.  Denies any LOC, n/v, or SOB, just sharp pain to R pectoralis. A&Ox4, ABCs in-tact, VSS.

## 2019-11-04 NOTE — DISCHARGE INSTRUCTIONS
Return for worsening difficulty breathing, fevers, chest pain.  Recommend close PCP follow-up. Recommend incentive spirometer 10x/hour.  DO NOT TAKE NORCO with valium at same time.

## 2019-11-04 NOTE — ED PROVIDER NOTES
History     Chief Complaint:  Fall      The history is provided by the patient.      Dago Dangelo is a 58 year old male with a history of coronary artery disease, hypertension, hyperlipidemia, and diabetes who presents for evaluation of a chest wall injury in the setting of a mechanical fall. About two hours prior to arrival, the patient reports tripping which caused him to fall and hit the right side of his chest against the corner of an end table. He denies any prodromal symptoms, loss of consciousness, or head injury, however the fall was unwitnessed. Since the fall, he reports the pain has been constant in his chest which he localizes under his right breast. He did not take any pain medications prior to arrival. Here, the patient describes the pain as sharp and states it is exacerbated by movement and breathing. He denies any shortness of breath, nausea, or vomiting. Notably, the patient had 4-5 beers earlier today, but none within the past few hours prior to arrival. He presents with his sister.  No history anticoagulation.     Allergies:  No Known Allergies     Medications:    Albuterol  Amlodipine  Atorvastatin  Coreg  Lopid  Losartan  Metformin  Nitroglycerin    Past Medical History:    CAD  Diabetes  Hypertension  Hyperlipidemia  NSTEMI  Arthritis    Past Surgical History:    Coronary stent    Family History:    Brothers: CAD, cerebrovascular disease   Sisters: Diabetes x3, Hypertension x2, Hyperlipidemia, Colon cancer  Mother: Hypertension, Hyperlipidemia  Father: Hypertension, Hyperlipidemia, cerebrovascular disease    Social History:  Marital Status:  Single [1]  Sister at bedside  Positive for alcohol use. Not daily, but 4-5 beers today.   Positive for tobacco use.   Negative for drug use.      Review of Systems   Respiratory: Negative for shortness of breath.    Cardiovascular: Positive for chest pain.   Gastrointestinal: Negative for nausea and vomiting.   Neurological: Negative for syncope,  "light-headedness and headaches.   All other systems reviewed and are negative.        Physical Exam     Patient Vitals for the past 24 hrs:   BP Temp Temp src Pulse Heart Rate Resp SpO2 Height Weight   11/03/19 1833 119/86 98.9  F (37.2  C) Oral -- 99 16 98 % 1.651 m (5' 5\") 90.7 kg (200 lb)   11/03/19 1830 -- -- -- 103 -- -- 98 % -- --   11/03/19 1825 119/86 -- -- 92 -- 18 100 % -- --        Physical Exam  General: Alert. Appears uncomfortable  Head:  The scalp, face, and head appear normal without trauma  Eyes:  Sclera white; Pupils are equal and round  ENT:    External ears normal.  No hemotympanum.      External nares normal.  No septal hematoma.    Neck:  No midline tenderness or pain with full ROM.  CV:  Rate as above with regular rhythm   No murmur   2/2 radial and dorsal pedal pulses  Chest wall:  R anterior chest wall reproducible tenderness just lateral to R. Sternum, no crepitance, ecchymosis  Resp:  Breath sounds clear and equal bilaterally    Non-labored, no retractions or accessory muscle use  GI:  Abdomen soft, non-tender, non-distended    No rebound tenderness or guarding  MSK:  No midline tenderness or bony step-off    No deformity    Moves all extremities equally and symmetrically  Skin:  No rash or lesions noted. Well healing scar to R. shin  Neuro:   No apparent deficit.    Strength 5/5 x4.  Sensation intact x4.     GCS: 15. Ambulates with steady gait, no tremor.   Psych:  Normal affect.        Emergency Department Course   Imaging:  Radiographic findings were communicated with the patient and family who voiced understanding of the findings.  XR Ribs, unilateral, 3 views + PA chest, right:  The visualized heart and lungs are negative. No rib fractures, as per radiology.     Procedures:  None    Interventions:  1843 Ibuprofen, 600 mg, PO   Valium, 5 mg, PO     Emergency Department Course:  Nursing notes and vitals reviewed.   1826: I performed an exam of the patient as documented above.      The " patient was sent for a chest x-ray while in the emergency department, results above.    Medicine administered as documented above.     1919: I rechecked the patient and discussed the results of his workup thus far.     Findings and plan explained to the Patient. Patient discharged home with instructions regarding supportive care, medications, and reasons to return. The importance of close follow-up was reviewed. The patient was prescribed Valium, Norco, and ibuprofen.      I personally reviewed the imaging results with the Patient and answered all related questions prior to discharge.    Impression & Plan    Medical Decision Making:  Dago Dangelo is a 58 year old male who presents with reported chest wall pain s/p reported mechanical fall.  Patient admits to ETOH consumption today.  He is clinically sober, ambulates with steady gait.  He has no evidence of obvious trauma on exam.  CXR without focal fracture, pneumothorax or acute process.  He is hemodynamically stable.  Discussed with patient xrays can miss occult fractures and may need formal CT to make diagnosis though patient comfortable with plans for outpatient f/u.  He was given PCP on call as well as prescriptions for norco, valium and ibuprofen.  I recommended that he not take Norco and Valium together to avoid sedation.  He was educated on incentive spirometer use which I recommended to avoid possible pneumonia.  He is instructed to return to the ED for worsening pain, difficulty breathing or should symptoms worsen or change.  The remainder of his head to toe exam is without evidence of trauma.  Both patient and his sister feel comfortable with plan at this time.    Diagnosis:    ICD-10-CM    1. Chest wall pain R07.89    2. Fall, initial encounter W19.XXXA        Disposition:  discharged to home    Discharge Medications:  New Prescriptions    DIAZEPAM (VALIUM) 5 MG TABLET    Take 1 tablet (5 mg) by mouth every 6 hours as needed for muscle spasms     HYDROCODONE-ACETAMINOPHEN (NORCO) 5-325 MG TABLET    Take 1 tablet by mouth every 6 hours as needed for pain    IBUPROFEN (ADVIL/MOTRIN) 600 MG TABLET    Take 1 tablet (600 mg) by mouth every 6 hours as needed for moderate pain     Scribe Disclosure:  I, Dena Petr, am serving as a scribe on 11/3/2019 at 6:25 PM to personally document services performed by Kaye Ayala DO based on my observations and the provider's statements to me.      11/3/2019   [facility] EMERGENCY DEPARTMENT       Kaye Ayala DO  11/03/19 2008

## 2021-01-15 ENCOUNTER — HEALTH MAINTENANCE LETTER (OUTPATIENT)
Age: 61
End: 2021-01-15

## 2021-01-22 ENCOUNTER — OFFICE VISIT (OUTPATIENT)
Dept: FAMILY MEDICINE | Facility: CLINIC | Age: 61
End: 2021-01-22
Payer: COMMERCIAL

## 2021-01-22 VITALS
DIASTOLIC BLOOD PRESSURE: 94 MMHG | BODY MASS INDEX: 33.15 KG/M2 | SYSTOLIC BLOOD PRESSURE: 161 MMHG | WEIGHT: 199 LBS | OXYGEN SATURATION: 97 % | RESPIRATION RATE: 16 BRPM | HEIGHT: 65 IN | TEMPERATURE: 98.4 F | HEART RATE: 93 BPM

## 2021-01-22 DIAGNOSIS — E11.9 TYPE 2 DIABETES MELLITUS WITHOUT COMPLICATION, WITHOUT LONG-TERM CURRENT USE OF INSULIN (H): Primary | ICD-10-CM

## 2021-01-22 DIAGNOSIS — F17.200 TOBACCO USE DISORDER: ICD-10-CM

## 2021-01-22 DIAGNOSIS — I21.4 NON-STEMI (NON-ST ELEVATED MYOCARDIAL INFARCTION) (H): ICD-10-CM

## 2021-01-22 DIAGNOSIS — E78.1 HYPERTRIGLYCERIDEMIA: ICD-10-CM

## 2021-01-22 DIAGNOSIS — S46.001A ROTATOR CUFF INJURY, RIGHT, INITIAL ENCOUNTER: ICD-10-CM

## 2021-01-22 DIAGNOSIS — E78.5 HYPERLIPIDEMIA LDL GOAL <70: ICD-10-CM

## 2021-01-22 DIAGNOSIS — M75.41 IMPINGEMENT SYNDROME OF SHOULDER REGION, RIGHT: ICD-10-CM

## 2021-01-22 DIAGNOSIS — I10 BENIGN ESSENTIAL HYPERTENSION: ICD-10-CM

## 2021-01-22 LAB
ANION GAP SERPL CALCULATED.3IONS-SCNC: 5 MMOL/L (ref 3–14)
BUN SERPL-MCNC: 12 MG/DL (ref 7–30)
CALCIUM SERPL-MCNC: 9 MG/DL (ref 8.5–10.1)
CHLORIDE SERPL-SCNC: 102 MMOL/L (ref 94–109)
CHOLEST SERPL-MCNC: 298 MG/DL
CO2 SERPL-SCNC: 27 MMOL/L (ref 20–32)
CREAT SERPL-MCNC: 0.81 MG/DL (ref 0.66–1.25)
CREAT UR-MCNC: 132 MG/DL
GFR SERPL CREATININE-BSD FRML MDRD: >90 ML/MIN/{1.73_M2}
GLUCOSE SERPL-MCNC: 266 MG/DL (ref 70–99)
HBA1C MFR BLD: 9 % (ref 0–5.6)
HDLC SERPL-MCNC: 30 MG/DL
LDLC SERPL CALC-MCNC: ABNORMAL MG/DL
LDLC SERPL DIRECT ASSAY-MCNC: 125 MG/DL
MICROALBUMIN UR-MCNC: 402 MG/L
MICROALBUMIN/CREAT UR: 304.54 MG/G CR (ref 0–17)
NONHDLC SERPL-MCNC: 268 MG/DL
POTASSIUM SERPL-SCNC: 3.8 MMOL/L (ref 3.4–5.3)
SODIUM SERPL-SCNC: 134 MMOL/L (ref 133–144)
TRIGL SERPL-MCNC: 1300 MG/DL

## 2021-01-22 PROCEDURE — 36415 COLL VENOUS BLD VENIPUNCTURE: CPT | Performed by: FAMILY MEDICINE

## 2021-01-22 PROCEDURE — 83721 ASSAY OF BLOOD LIPOPROTEIN: CPT | Mod: 59 | Performed by: FAMILY MEDICINE

## 2021-01-22 PROCEDURE — 99214 OFFICE O/P EST MOD 30 MIN: CPT | Performed by: FAMILY MEDICINE

## 2021-01-22 PROCEDURE — 82043 UR ALBUMIN QUANTITATIVE: CPT | Performed by: FAMILY MEDICINE

## 2021-01-22 PROCEDURE — 83036 HEMOGLOBIN GLYCOSYLATED A1C: CPT | Performed by: FAMILY MEDICINE

## 2021-01-22 PROCEDURE — 80061 LIPID PANEL: CPT | Performed by: FAMILY MEDICINE

## 2021-01-22 PROCEDURE — 80048 BASIC METABOLIC PNL TOTAL CA: CPT | Performed by: FAMILY MEDICINE

## 2021-01-22 RX ORDER — AMLODIPINE BESYLATE 5 MG/1
10 TABLET ORAL DAILY
Qty: 60 TABLET | Refills: 0 | Status: CANCELLED | OUTPATIENT
Start: 2021-01-22

## 2021-01-22 RX ORDER — DIAZEPAM 5 MG
5 TABLET ORAL EVERY 6 HOURS PRN
Qty: 15 TABLET | Refills: 0 | Status: CANCELLED | OUTPATIENT
Start: 2021-01-22

## 2021-01-22 RX ORDER — NITROGLYCERIN 0.4 MG/1
0.4 TABLET SUBLINGUAL EVERY 5 MIN PRN
Qty: 25 TABLET | Refills: 1 | Status: SHIPPED | OUTPATIENT
Start: 2021-01-22

## 2021-01-22 RX ORDER — GEMFIBROZIL 600 MG/1
600 TABLET, FILM COATED ORAL 2 TIMES DAILY
Qty: 60 TABLET | Refills: 3 | Status: SHIPPED | OUTPATIENT
Start: 2021-01-22 | End: 2021-05-17

## 2021-01-22 RX ORDER — CARVEDILOL 25 MG/1
25 TABLET ORAL 2 TIMES DAILY WITH MEALS
Qty: 180 TABLET | Refills: 1 | Status: SHIPPED | OUTPATIENT
Start: 2021-01-22 | End: 2021-07-22

## 2021-01-22 RX ORDER — ALBUTEROL SULFATE 90 UG/1
2 AEROSOL, METERED RESPIRATORY (INHALATION) EVERY 6 HOURS PRN
Qty: 1 INHALER | Refills: 0 | Status: CANCELLED | OUTPATIENT
Start: 2021-01-22

## 2021-01-22 RX ORDER — LOSARTAN POTASSIUM 100 MG/1
100 TABLET ORAL DAILY
Qty: 90 TABLET | Refills: 1 | Status: SHIPPED | OUTPATIENT
Start: 2021-01-22 | End: 2021-07-22

## 2021-01-22 ASSESSMENT — ENCOUNTER SYMPTOMS
PALPITATIONS: 0
JOINT SWELLING: 0
EYE PAIN: 0
SORE THROAT: 0
HEADACHES: 0
SHORTNESS OF BREATH: 0
CONSTIPATION: 0
PARESTHESIAS: 0
FEVER: 0
HEARTBURN: 0
CONSTITUTIONAL NEGATIVE: 1
ABDOMINAL PAIN: 0
DYSURIA: 0
NERVOUS/ANXIOUS: 0
COUGH: 0
WEAKNESS: 0
NAUSEA: 0
MYALGIAS: 0
CHILLS: 0
HEMATOCHEZIA: 0
ARTHRALGIAS: 1
HEMATURIA: 0
DIZZINESS: 0
DIARRHEA: 0
FREQUENCY: 0

## 2021-01-22 ASSESSMENT — MIFFLIN-ST. JEOR: SCORE: 1634.54

## 2021-01-22 NOTE — PROGRESS NOTES
Assessment and Plan    (E11.9) Type 2 diabetes mellitus without complication, without long-term current use of insulin (H)  (primary encounter diagnosis)  Comment: A1c up, but has been off of meds.  Will have restart and check again in 3m  Plan: HEMOGLOBIN A1C, Albumin Random Urine         Quantitative with Creat Ratio, BASIC METABOLIC         PANEL, metFORMIN (GLUCOPHAGE) 500 MG tablet            (I21.4) Non-STEMI (non-ST elevated myocardial infarction) (H)  Comment: refilling  Plan: carvedilol (COREG) 25 MG tablet, nitroGLYcerin         (NITROSTAT) 0.4 MG sublingual tablet,         ticagrelor (BRILINTA) 90 MG tablet, aspirin         (GOODSENSE ASPIRIN LOW DOSE) 81 MG EC tablet            (I10) Benign essential hypertension  Comment: not currently on meds, 1w BP recheck w/nse  Plan: BASIC METABOLIC PANEL, losartan (COZAAR) 100 MG        tablet            (E78.1) Hypertriglyceridemia  Comment: refilling  Plan: gemfibrozil (LOPID) 600 MG tablet            (E78.5) Hyperlipidemia LDL goal <70  Comment:   Plan: Lipid panel reflex to direct LDL Fasting            (F17.200) Tobacco use disorder  Comment: not ready to quit  Plan:     (S46.001A) Rotator cuff injury, right, initial encounter  Comment:   Plan: [facility]            (M75.41) Impingement syndrome of shoulder region, right  Comment:   Plan: [facility]              RTC in 1w for BP recheck, 3m for DM f/u    Tl Ortiz MD      Kelley De La Cruz is a 60 year old who presents to clinic today for the following health issues     HPI       Musculoskeletal problem/pain  Onset/Duration: summer of 2020  Description  Location: shoulder - right  Joint Swelling: no  Redness: no  Pain: YES  Warmth: no  Intensity:  mild, severe  Progression of Symptoms:  same and intermittent  Accompanying signs and symptoms:   Fevers: no  Numbness/tingling/weakness: YES  History  Trauma to the area: no  Recent illness:   "no  Previous similar problem: no  Previous evaluation:  no  Precipitating or alleviating factors:  Aggravating factors include: none  Therapies tried and outcome: rest/inactivity, heat, ice, immobilization, acetaminophen, Ibuprofen and deep heating rub    No event or activity that he associates with shoulder pain.  Feels pain on outer side of shoulder, but notes that some biceps and mebial elbow pain.  None of his various interventions seems to help.  Heat was soothing but no lasting benefit.    No medications at all recently, so is not surprised that BP and A1c are high.    Review of Systems   Constitutional: Negative.  Negative for chills and fever.   HENT: Negative for congestion, ear pain, hearing loss and sore throat.    Eyes: Negative for pain and visual disturbance.   Respiratory: Negative for cough and shortness of breath.    Cardiovascular: Negative for chest pain, palpitations and peripheral edema.   Gastrointestinal: Negative for abdominal pain, constipation, diarrhea, heartburn, hematochezia and nausea.   Genitourinary: Negative for discharge, dysuria, frequency, genital sores, hematuria, impotence and urgency.   Musculoskeletal: Positive for arthralgias. Negative for joint swelling and myalgias.   Skin: Negative for rash.   Neurological: Negative for dizziness, weakness, headaches and paresthesias.   Psychiatric/Behavioral: Negative for mood changes. The patient is not nervous/anxious.             Objective    BP (!) 161/94 (BP Location: Right arm, Patient Position: Sitting, Cuff Size: Adult Regular)   Pulse 93   Temp 98.4  F (36.9  C) (Oral)   Resp 16   Ht 1.651 m (5' 5\")   Wt 90.3 kg (199 lb)   SpO2 97%   BMI 33.12 kg/m    Body mass index is 33.12 kg/m .  Physical Exam  Vitals signs reviewed.   Eyes:      Conjunctiva/sclera: Conjunctivae normal.   Cardiovascular:      Rate and Rhythm: Normal rate and regular rhythm.      Heart sounds: Normal heart sounds.   Pulmonary:      Effort: Pulmonary " effort is normal.      Breath sounds: Normal breath sounds.   Musculoskeletal:      Right shoulder: He exhibits decreased range of motion. He exhibits no crepitus.      Comments: Positive impingement, weak to external rotation, limited abduction   Skin:     General: Skin is warm and dry.   Neurological:      Mental Status: He is alert and oriented to person, place, and time.

## 2021-01-27 ENCOUNTER — THERAPY VISIT (OUTPATIENT)
Dept: PHYSICAL THERAPY | Facility: CLINIC | Age: 61
End: 2021-01-27
Attending: FAMILY MEDICINE
Payer: COMMERCIAL

## 2021-01-27 DIAGNOSIS — M25.511 SHOULDER PAIN, RIGHT: ICD-10-CM

## 2021-01-27 DIAGNOSIS — M75.41 IMPINGEMENT SYNDROME OF SHOULDER REGION, RIGHT: ICD-10-CM

## 2021-01-27 DIAGNOSIS — S46.001A ROTATOR CUFF INJURY, RIGHT, INITIAL ENCOUNTER: ICD-10-CM

## 2021-01-27 PROCEDURE — 97162 PT EVAL MOD COMPLEX 30 MIN: CPT | Mod: GP | Performed by: PHYSICAL THERAPIST

## 2021-01-27 PROCEDURE — 97112 NEUROMUSCULAR REEDUCATION: CPT | Mod: GP | Performed by: PHYSICAL THERAPIST

## 2021-01-27 PROCEDURE — 97110 THERAPEUTIC EXERCISES: CPT | Mod: GP | Performed by: PHYSICAL THERAPIST

## 2021-01-27 NOTE — PROGRESS NOTES
[facility] Initial Evaluation  Subjective:  The history is provided by the patient. No  was used.   Patient Health History  Dago Dangelo being seen for Shoulder injury/pain Rotator cuff .     Problem began: 1/22/2021.   Problem occurred: States about 5 years ago he had right shoulder pain that resolved and then about 2 years ago started to get pain in the shoulder again and again it resolved.  Then about 6 months ago started to get pain in the right shoulder and into upper arm and it is getting worse.   Pain is reported as 6/10 (0/10 at best at worst 10/10) on pain scale.  General health as reported by patient is good.  Pertinent medical history includes: smoking, heart problems, diabetes, high blood pressure, numbness/tingling and overweight.   Red flags:  None as reported by patient.  Medical allergies: none.   Surgeries include:  Heart surgery. Other surgery history details: 2015 stent placed for MI.    Current medications:  Cardiac medication and high blood pressure medication.    Current occupation is none.   Primary job tasks include:  Computer work, driving, lifting/carrying, operating a machine/assembly, prolonged sitting, pushing/pulling and repetitive tasks.                  Therapist Generated HPI Evaluation         Type of problem:  Right shoulder.    This is a new condition.  Condition occurred with:  Repetition/overuse.  Where condition occurred: for unknown reasons.  Patient reports pain:  Anterior, lateral, posterior and upper arm.  Pain is described as aching and sharp and is intermittent.  Pain radiates to:  Elbow, upper arm and lower arm.   Since onset symptoms are gradually worsening.  Associated symptoms:  Loss of motion/stiffness, loss of strength and numbness. Symptoms are exacerbated by lying on extremity, lifting, carrying and using arm overhead  and relieved by heat and ice (resting are across his body).      Barriers include:  None as reported  by patient.                        Objective:  Standing Alignment:    Cervical/Thoracic:  Forward head  Shoulder/UE:  Rounded shoulders                                       Shoulder Evaluation:  ROM:  AROM:    Flexion:  Left:  152    Right:  140    Abduction:  Left: 170   Right:  144      External Rotation:  Left:  70    Right:  70          Flexion/External Rotation:  Left:  T8    Right:  T12      PROM:    Flexion:  Right: 155      Abduction:  Right:  168                      Pain: pain with active flexion, abduction and ER, and passive abduction    Strength:    Flexion: Left:5/5   Pain:    Right: 4/5     Pain:   Extension:  Left: 5/5    Pain:    Right: 4+/5    Pain:  Abduction:  Left: 5/5  Pain:    Right: 4-/5      Pain:+    Internal Rotation:  Left:5/5     Pain:    Right: 4+/5     Pain:  External Rotation:   Left:5/5     Pain:   Right:4/5     Pain:              Special Tests:  Special tests assessed shoulder: + ulnar and radial nerve tests on R.    Right shoulder positive for the following special tests:Impingement and Neural Tension  Palpation:  normal                                         General     ROS    Assessment/Plan:    Patient is a 60 year old male with right side shoulder complaints.    Patient has the following significant findings with corresponding treatment plan.                Diagnosis 1:  Right shoulder pain with radicular symptoms  Pain -  hot/cold therapy, manual therapy, self management, education and home program  Decreased ROM/flexibility - manual therapy, therapeutic exercise, therapeutic activity and home program  Decreased strength - therapeutic exercise, therapeutic activities and home program  Impaired muscle performance - neuro re-education and home program  Decreased function - therapeutic activities and home program  Impaired posture - neuro re-education, therapeutic activities and home program    Therapy Evaluation Codes:   1) History comprised of:   Personal factors that impact  the plan of care:      Age, Past/current experiences and Time since onset of symptoms.    Comorbidity factors that impact the plan of care are:      Diabetes, Heart problems, High blood pressure, Numbness/tingling, Overweight and Smoking.     Medications impacting care: High blood pressure and cardiac medication.  2) Examination of Body Systems comprised of:   Body structures and functions that impact the plan of care:      Shoulder.   Activity limitations that impact the plan of care are:      Driving, Dressing, Lifting, Sleeping and Laying down.  3) Clinical presentation characteristics are:   Stable/Uncomplicated.  4) Decision-Making    Moderate complexity using standardized patient assessment instrument and/or measureable assessment of functional outcome.  Cumulative Therapy Evaluation is: Moderate complexity.    Previous and current functional limitations:  (See Goal Flow Sheet for this information)    Short term and Long term goals: (See Goal Flow Sheet for this information)     Communication ability:  Patient appears to be able to clearly communicate and understand verbal and written communication and follow directions correctly.  Treatment Explanation - The following has been discussed with the patient:   RX ordered/plan of care  Anticipated outcomes  Possible risks and side effects  This patient would benefit from PT intervention to resume normal activities.   Rehab potential is good.    Frequency:  1 X week, once daily  Duration:  for 8 weeks  Discharge Plan:  Achieve all LTG.  Independent in home treatment program.  Reach maximal therapeutic benefit.    Please refer to the daily flowsheet for treatment today, total treatment time and time spent performing 1:1 timed codes.

## 2021-02-03 ENCOUNTER — THERAPY VISIT (OUTPATIENT)
Dept: PHYSICAL THERAPY | Facility: CLINIC | Age: 61
End: 2021-02-03
Payer: COMMERCIAL

## 2021-02-03 DIAGNOSIS — M25.511 SHOULDER PAIN, RIGHT: ICD-10-CM

## 2021-02-03 PROCEDURE — 97140 MANUAL THERAPY 1/> REGIONS: CPT | Mod: GP | Performed by: PHYSICAL THERAPIST

## 2021-02-03 PROCEDURE — 97112 NEUROMUSCULAR REEDUCATION: CPT | Mod: GP | Performed by: PHYSICAL THERAPIST

## 2021-02-03 PROCEDURE — 97110 THERAPEUTIC EXERCISES: CPT | Mod: GP | Performed by: PHYSICAL THERAPIST

## 2021-02-12 NOTE — LETTER
[address], [address]  [facility] [address]  Phone: [phone]  Fax: [phone]    January 10, 2018        Daog Dangelo  [address] [place] [id number]          To whom it may concern:    RE: Dago Dangelo    Patient was seen and treated today at our clinic.  Patient may return to work 1/11/18 with the following:  No restrictions    Please contact me for questions or concerns.      Sincerely,        Tl Ortiz MD  
PROVIDER:[TOKEN:[[id number]]]

## 2021-02-17 ENCOUNTER — THERAPY VISIT (OUTPATIENT)
Dept: PHYSICAL THERAPY | Facility: CLINIC | Age: 61
End: 2021-02-17
Payer: COMMERCIAL

## 2021-02-17 DIAGNOSIS — M25.511 SHOULDER PAIN, RIGHT: ICD-10-CM

## 2021-02-17 PROCEDURE — 97112 NEUROMUSCULAR REEDUCATION: CPT | Mod: GP | Performed by: PHYSICAL THERAPIST

## 2021-02-17 PROCEDURE — 97140 MANUAL THERAPY 1/> REGIONS: CPT | Mod: GP | Performed by: PHYSICAL THERAPIST

## 2021-02-17 PROCEDURE — 97110 THERAPEUTIC EXERCISES: CPT | Mod: GP | Performed by: PHYSICAL THERAPIST

## 2021-02-23 ENCOUNTER — ALLIED HEALTH/NURSE VISIT (OUTPATIENT)
Dept: NURSING | Facility: CLINIC | Age: 61
End: 2021-02-23
Payer: COMMERCIAL

## 2021-02-23 VITALS — SYSTOLIC BLOOD PRESSURE: 142 MMHG | OXYGEN SATURATION: 96 % | DIASTOLIC BLOOD PRESSURE: 74 MMHG | HEART RATE: 81 BPM

## 2021-02-23 DIAGNOSIS — I10 BENIGN ESSENTIAL HYPERTENSION: Primary | ICD-10-CM

## 2021-02-23 PROCEDURE — 99207 PR NO CHARGE NURSE ONLY: CPT

## 2021-02-23 NOTE — PROGRESS NOTES
In for blood pressure check.    Today's reading: BP (!) 142/74   Pulse 81   SpO2 96%      History   Smoking Status     Current Every Day Smoker     Packs/day: 1.00     Years: 30.00     Types: Cigarettes   Smokeless Tobacco     Never Used     Comment: may have one once weekly       Current Outpatient Medications   Medication Sig     aspirin (GOODSENSE ASPIRIN LOW DOSE) 81 MG EC tablet Take 1 tablet (81 mg) by mouth daily     carvedilol (COREG) 25 MG tablet Take 1 tablet (25 mg) by mouth 2 times daily (with meals)     gemfibrozil (LOPID) 600 MG tablet Take 1 tablet (600 mg) by mouth 2 times daily     ibuprofen (ADVIL/MOTRIN) 600 MG tablet Take 1 tablet (600 mg) by mouth every 6 hours as needed for moderate pain     losartan (COZAAR) 100 MG tablet Take 1 tablet (100 mg) by mouth daily     metFORMIN (GLUCOPHAGE) 500 MG tablet TAKE TWO TABLETS BY MOUTH TWICE A DAY WITH MEALS     multivitamin, therapeutic with minerals (THERA-VIT-M) TABS Take 1 tablet by mouth daily     nitroGLYcerin (NITROSTAT) 0.4 MG sublingual tablet Place 1 tablet (0.4 mg) under the tongue every 5 minutes as needed for chest pain if you are still having symptoms after 3 doses (15 minutes) call 911.     order for DME Blood pressure cuff     ticagrelor (BRILINTA) 90 MG tablet Take 1 tablet (90 mg) by mouth every 12 hours     amLODIPine (NORVASC) 5 MG tablet Take 2 tablets (10 mg) by mouth daily     No current facility-administered medications for this visit.         He is having his blood pressure monitored because it has been mildly elevated and has been on medication.    Dago has had the following symptoms: none    We discussed Medication compliance

## 2021-02-24 ENCOUNTER — THERAPY VISIT (OUTPATIENT)
Dept: PHYSICAL THERAPY | Facility: CLINIC | Age: 61
End: 2021-02-24
Payer: COMMERCIAL

## 2021-02-24 DIAGNOSIS — M25.511 SHOULDER PAIN, RIGHT: ICD-10-CM

## 2021-02-24 PROCEDURE — 97110 THERAPEUTIC EXERCISES: CPT | Mod: GP | Performed by: PHYSICAL THERAPIST

## 2021-02-24 PROCEDURE — 97112 NEUROMUSCULAR REEDUCATION: CPT | Mod: GP | Performed by: PHYSICAL THERAPIST

## 2021-02-24 PROCEDURE — 97140 MANUAL THERAPY 1/> REGIONS: CPT | Mod: GP | Performed by: PHYSICAL THERAPIST

## 2021-02-24 PROCEDURE — 97035 APP MDLTY 1+ULTRASOUND EA 15: CPT | Mod: GP | Performed by: PHYSICAL THERAPIST

## 2021-03-03 ENCOUNTER — THERAPY VISIT (OUTPATIENT)
Dept: PHYSICAL THERAPY | Facility: CLINIC | Age: 61
End: 2021-03-03
Payer: COMMERCIAL

## 2021-03-03 DIAGNOSIS — M25.511 SHOULDER PAIN, RIGHT: ICD-10-CM

## 2021-03-03 PROCEDURE — 97112 NEUROMUSCULAR REEDUCATION: CPT | Mod: GP | Performed by: PHYSICAL THERAPIST

## 2021-03-03 PROCEDURE — 97110 THERAPEUTIC EXERCISES: CPT | Mod: GP | Performed by: PHYSICAL THERAPIST

## 2021-03-03 PROCEDURE — 97140 MANUAL THERAPY 1/> REGIONS: CPT | Mod: GP | Performed by: PHYSICAL THERAPIST

## 2021-03-10 ENCOUNTER — THERAPY VISIT (OUTPATIENT)
Dept: PHYSICAL THERAPY | Facility: CLINIC | Age: 61
End: 2021-03-10
Payer: COMMERCIAL

## 2021-03-10 DIAGNOSIS — M25.511 SHOULDER PAIN, RIGHT: ICD-10-CM

## 2021-03-10 PROCEDURE — 97140 MANUAL THERAPY 1/> REGIONS: CPT | Mod: GP | Performed by: PHYSICAL THERAPIST

## 2021-03-10 PROCEDURE — 97112 NEUROMUSCULAR REEDUCATION: CPT | Mod: GP | Performed by: PHYSICAL THERAPIST

## 2021-03-10 PROCEDURE — 97110 THERAPEUTIC EXERCISES: CPT | Mod: GP | Performed by: PHYSICAL THERAPIST

## 2021-03-17 ENCOUNTER — THERAPY VISIT (OUTPATIENT)
Dept: PHYSICAL THERAPY | Facility: CLINIC | Age: 61
End: 2021-03-17
Payer: COMMERCIAL

## 2021-03-17 DIAGNOSIS — M25.511 SHOULDER PAIN, RIGHT: ICD-10-CM

## 2021-03-17 PROCEDURE — 97112 NEUROMUSCULAR REEDUCATION: CPT | Mod: GP | Performed by: PHYSICAL THERAPIST

## 2021-03-17 PROCEDURE — 97140 MANUAL THERAPY 1/> REGIONS: CPT | Mod: GP | Performed by: PHYSICAL THERAPIST

## 2021-03-17 PROCEDURE — 97110 THERAPEUTIC EXERCISES: CPT | Mod: GP | Performed by: PHYSICAL THERAPIST

## 2021-03-18 ENCOUNTER — IMMUNIZATION (OUTPATIENT)
Dept: NURSING | Facility: CLINIC | Age: 61
End: 2021-03-18
Payer: COMMERCIAL

## 2021-03-18 PROCEDURE — 91301 PR COVID VAC MODERNA 100 MCG/0.5 ML IM: CPT

## 2021-03-18 PROCEDURE — 0011A PR COVID VAC MODERNA 100 MCG/0.5 ML IM: CPT

## 2021-04-14 ENCOUNTER — THERAPY VISIT (OUTPATIENT)
Dept: PHYSICAL THERAPY | Facility: CLINIC | Age: 61
End: 2021-04-14
Payer: COMMERCIAL

## 2021-04-14 DIAGNOSIS — M25.511 SHOULDER PAIN, RIGHT: ICD-10-CM

## 2021-04-14 PROCEDURE — 97110 THERAPEUTIC EXERCISES: CPT | Mod: GP | Performed by: PHYSICAL THERAPIST

## 2021-04-14 PROCEDURE — 97140 MANUAL THERAPY 1/> REGIONS: CPT | Mod: GP | Performed by: PHYSICAL THERAPIST

## 2021-04-15 ENCOUNTER — IMMUNIZATION (OUTPATIENT)
Dept: NURSING | Facility: CLINIC | Age: 61
End: 2021-04-15
Attending: INTERNAL MEDICINE
Payer: COMMERCIAL

## 2021-04-15 PROCEDURE — 91301 PR COVID VAC MODERNA 100 MCG/0.5 ML IM: CPT

## 2021-04-15 PROCEDURE — 0012A PR COVID VAC MODERNA 100 MCG/0.5 ML IM: CPT

## 2021-05-16 DIAGNOSIS — E78.1 HYPERTRIGLYCERIDEMIA: ICD-10-CM

## 2021-05-17 RX ORDER — GEMFIBROZIL 600 MG/1
TABLET, FILM COATED ORAL
Qty: 90 TABLET | Refills: 0 | Status: SHIPPED | OUTPATIENT
Start: 2021-05-17 | End: 2021-07-02

## 2021-05-17 NOTE — TELEPHONE ENCOUNTER
Prescription approved per [facility] protocol.    Meagan Bran RN on 5/17/2021 at 3:17 PM

## 2021-06-08 ENCOUNTER — OFFICE VISIT (OUTPATIENT)
Dept: FAMILY MEDICINE | Facility: CLINIC | Age: 61
End: 2021-06-08
Payer: COMMERCIAL

## 2021-06-08 VITALS
BODY MASS INDEX: 32.15 KG/M2 | WEIGHT: 193 LBS | HEART RATE: 69 BPM | RESPIRATION RATE: 18 BRPM | TEMPERATURE: 97.9 F | DIASTOLIC BLOOD PRESSURE: 84 MMHG | HEIGHT: 65 IN | OXYGEN SATURATION: 98 % | SYSTOLIC BLOOD PRESSURE: 150 MMHG

## 2021-06-08 DIAGNOSIS — E11.9 TYPE 2 DIABETES MELLITUS WITHOUT COMPLICATION, WITHOUT LONG-TERM CURRENT USE OF INSULIN (H): Primary | ICD-10-CM

## 2021-06-08 DIAGNOSIS — I10 BENIGN ESSENTIAL HYPERTENSION: ICD-10-CM

## 2021-06-08 DIAGNOSIS — E78.5 HYPERLIPIDEMIA LDL GOAL <70: ICD-10-CM

## 2021-06-08 LAB
CHOLEST SERPL-MCNC: 279 MG/DL
HBA1C MFR BLD: 9.8 % (ref 0–5.6)
HDLC SERPL-MCNC: 33 MG/DL
LDLC SERPL CALC-MCNC: ABNORMAL MG/DL
NONHDLC SERPL-MCNC: 246 MG/DL
TRIGL SERPL-MCNC: 578 MG/DL

## 2021-06-08 PROCEDURE — 36415 COLL VENOUS BLD VENIPUNCTURE: CPT | Performed by: FAMILY MEDICINE

## 2021-06-08 PROCEDURE — 83036 HEMOGLOBIN GLYCOSYLATED A1C: CPT | Performed by: FAMILY MEDICINE

## 2021-06-08 PROCEDURE — 83721 ASSAY OF BLOOD LIPOPROTEIN: CPT | Mod: 59 | Performed by: FAMILY MEDICINE

## 2021-06-08 PROCEDURE — 80061 LIPID PANEL: CPT | Performed by: FAMILY MEDICINE

## 2021-06-08 PROCEDURE — 99214 OFFICE O/P EST MOD 30 MIN: CPT | Performed by: FAMILY MEDICINE

## 2021-06-08 RX ORDER — ATORVASTATIN CALCIUM 10 MG/1
10 TABLET, FILM COATED ORAL DAILY
Qty: 90 TABLET | Refills: 3 | Status: SHIPPED | OUTPATIENT
Start: 2021-06-08 | End: 2021-06-11

## 2021-06-08 RX ORDER — CHLORTHALIDONE 25 MG/1
25 TABLET ORAL DAILY
Qty: 30 TABLET | Refills: 1 | Status: SHIPPED | OUTPATIENT
Start: 2021-06-08 | End: 2021-07-30

## 2021-06-08 RX ORDER — DAPAGLIFLOZIN 10 MG/1
10 TABLET, FILM COATED ORAL DAILY
Qty: 90 TABLET | Refills: 1 | Status: SHIPPED | OUTPATIENT
Start: 2021-06-08 | End: 2021-10-12

## 2021-06-08 ASSESSMENT — MIFFLIN-ST. JEOR: SCORE: 1607.32

## 2021-06-08 ASSESSMENT — ENCOUNTER SYMPTOMS
SHORTNESS OF BREATH: 0
HEADACHES: 0
CONSTITUTIONAL NEGATIVE: 1
PALPITATIONS: 0

## 2021-06-08 ASSESSMENT — PAIN SCALES - GENERAL: PAINLEVEL: MILD PAIN (2)

## 2021-06-08 NOTE — PROGRESS NOTES
Assessment and Plan    (E11.9) Type 2 diabetes mellitus without complication, without long-term current use of insulin (H)  (primary encounter diagnosis)  Comment: no really change, adding Farxiga  Plan: Hemoglobin A1c, dapagliflozin (FARXIGA) 10 MG         TABS tablet            (E78.5) Hyperlipidemia LDL goal <70  Comment: adding statin, is currently fasting  Plan: atorvastatin (LIPITOR) 10 MG tablet, Lipid         panel reflex to direct LDL Fasting            (I10) Benign essential hypertension  Comment: starting diuretic  Plan: chlorthalidone (HYGROTON) 25 MG tablet              RTC in     Tl Ortiz MD      Kelley De La Cruz is a 60 year old who presents for the following health issues     HPI     Diabetes Follow-up      How often are you checking your blood sugar? Not at all    What concerns do you have today about your diabetes? None     Do you have any of these symptoms? (Select all that apply)  No numbness or tingling in feet.  No redness, sores or blisters on feet.  No complaints of excessive thirst.  No reports of blurry vision.  No significant changes to weight.    Have you had a diabetic eye exam in the last 12 months? No          Hyperlipidemia Follow-Up      Are you regularly taking any medication or supplement to lower your cholesterol?   yes    Are you having muscle aches or other side effects that you think could be caused by your cholesterol lowering medication?  no    Hypertension Follow-up      Do you check your blood pressure regularly outside of the clinic? Yes     Are you following a low salt diet? Yes    Are your blood pressures ever more than 140 on the top number (systolic) OR more   than 90 on the bottom number (diastolic), for example 140/90? Yes    BP Readings from Last 2 Encounters:   06/08/21 (!) 150/84   02/23/21 (!) 142/74     Hemoglobin A1C (%)   Date Value   06/08/2021 9.8 (H)   01/22/2021 9.0 (H)     LDL Cholesterol Calculated (mg/dL)   Date Value   01/22/2021     Cannot  "estimate LDL when triglyceride exceeds 400 mg/dL   08/17/2018 46     LDL Cholesterol Direct (mg/dL)   Date Value   01/22/2021 125 (H)         How many servings of fruits and vegetables do you eat daily?  0-1    On average, how many sweetened beverages do you drink each day (Examples: soda, juice, sweet tea, etc.  Do NOT count diet or artificially sweetened beverages)?   0    How many days per week do you exercise enough to make your heart beat faster? 3 or less    How many minutes a day do you exercise enough to make your heart beat faster? 10 - 19    How many days per week do you miss taking your medication? 0        Review of Systems   Constitutional: Negative.    Eyes: Negative for visual disturbance.   Respiratory: Negative for shortness of breath.    Cardiovascular: Negative for chest pain, palpitations and peripheral edema.   Neurological: Negative for headaches.            Objective    BP (!) 150/84   Pulse 69   Temp 97.9  F (36.6  C) (Oral)   Resp 18   Ht 1.651 m (5' 5\")   Wt 87.5 kg (193 lb)   SpO2 98%   BMI 32.12 kg/m    Body mass index is 32.12 kg/m .  Physical Exam  Vitals signs and nursing note reviewed.   Constitutional:       General: He is not in acute distress.     Appearance: He is well-developed.   Cardiovascular:      Rate and Rhythm: Normal rate and regular rhythm.      Heart sounds: Normal heart sounds.   Pulmonary:      Effort: Pulmonary effort is normal.      Breath sounds: Normal breath sounds.   Skin:     General: Skin is warm and dry.   Neurological:      Mental Status: He is alert and oriented to person, place, and time.   Psychiatric:         Judgment: Judgment normal.                        "

## 2021-06-09 PROBLEM — M25.511 SHOULDER PAIN, RIGHT: Status: RESOLVED | Noted: 2021-01-27 | Resolved: 2021-06-09

## 2021-06-09 LAB — LDLC SERPL DIRECT ASSAY-MCNC: 168 MG/DL

## 2021-06-09 NOTE — PROGRESS NOTES
Subjective:  HPI  Physical Exam                    Objective:  System    Physical Exam    General     ROS    Assessment/Plan:    DISCHARGE REPORT    Progress reporting period is from 1/27/21 to 4/14/21.       SUBJECTIVE  Subjective changes noted by patient: Patient reports the arm feels better this week and he was able to do some drilling with minimal pain.  Reaching behind his back depends on the day some days he has no pain and it is easier and other days he has pain and difficulty moving the arm    Current Pain level: 0/10.     Initial Pain level: 10/10.   Changes in function:  Yes (See Goal flowsheet attached for changes in current functional level)  Adverse reaction to treatment or activity: None      ASSESSMENT/PLAN  Updated problem list and treatment plan: Diagnosis 1:  Right shoulder pain with radicular symptom    STG/LTGs have been met or progress has been made towards goals:  Yes (See Goal flow sheet completed today.)  Assessment of Progress: The patient has not returned to therapy. Current status is unknown.  Self Management Plans:  Patient is independent in a home treatment program.  Patient is independent in self management of symptoms.  I have re-evaluated this patient and find that the nature, scope, duration and intensity of the therapy is appropriate for the medical condition of the patient.  Dago continues to require the following intervention to meet STG and LTG's:  PT intervention is no longer required to meet STG/LTG.    Recommendations:  This patient is ready to be discharged from therapy and continue their home treatment program.    Please refer to the daily flowsheet for treatment today, total treatment time and time spent performing 1:1 timed codes.

## 2021-06-11 DIAGNOSIS — E78.5 HYPERLIPIDEMIA LDL GOAL <70: ICD-10-CM

## 2021-06-11 RX ORDER — ATORVASTATIN CALCIUM 40 MG/1
40 TABLET, FILM COATED ORAL DAILY
Qty: 90 TABLET | Refills: 1 | Status: SHIPPED | OUTPATIENT
Start: 2021-06-11 | End: 2021-06-21

## 2021-07-01 ENCOUNTER — DOCUMENTATION ONLY (OUTPATIENT)
Dept: LAB | Facility: CLINIC | Age: 61
End: 2021-07-01

## 2021-07-01 NOTE — PROGRESS NOTES
This patient has made a lab appt for 7-8-21. Please place any orders you would like as future orders, thank  You Floresita in lab.

## 2021-07-02 ENCOUNTER — MYC MEDICAL ADVICE (OUTPATIENT)
Dept: FAMILY MEDICINE | Facility: CLINIC | Age: 61
End: 2021-07-02

## 2021-07-02 DIAGNOSIS — E78.1 HYPERTRIGLYCERIDEMIA: ICD-10-CM

## 2021-07-02 DIAGNOSIS — E11.9 TYPE 2 DIABETES MELLITUS WITHOUT COMPLICATION, WITHOUT LONG-TERM CURRENT USE OF INSULIN (H): Primary | ICD-10-CM

## 2021-07-02 RX ORDER — GEMFIBROZIL 600 MG/1
600 TABLET, FILM COATED ORAL 2 TIMES DAILY
Qty: 180 TABLET | Refills: 0 | Status: SHIPPED | OUTPATIENT
Start: 2021-07-02 | End: 2021-10-08

## 2021-07-02 NOTE — TELEPHONE ENCOUNTER
Lab Results   Component Value Date    A1C 9.8 06/08/2021    A1C 9.0 01/22/2021    A1C 7.4 08/17/2018    A1C 7.6 02/20/2018    A1C 6.7 07/07/2017       Did you want patient to come in for another A1c?

## 2021-07-02 NOTE — TELEPHONE ENCOUNTER
Prescription approved per [facility] protocol.    Meagan Bran RN on 7/2/2021 at 3:54 PM

## 2021-07-02 NOTE — PROGRESS NOTES
LM asking patient if he was bringing lab orders from outside provider or if these were in follow up to his most recent visit.  -Dena Abdalla

## 2021-07-02 NOTE — PROGRESS NOTES
I don't have plan for Jono to get labs for me.  Can we check with him and see why he's coming in.  Does have lab orders from another dr?    Tl Ortiz MD

## 2021-07-08 ENCOUNTER — ALLIED HEALTH/NURSE VISIT (OUTPATIENT)
Dept: NURSING | Facility: CLINIC | Age: 61
End: 2021-07-08
Payer: COMMERCIAL

## 2021-07-08 VITALS — SYSTOLIC BLOOD PRESSURE: 140 MMHG | HEART RATE: 76 BPM | DIASTOLIC BLOOD PRESSURE: 80 MMHG

## 2021-07-08 DIAGNOSIS — E11.9 TYPE 2 DIABETES MELLITUS WITHOUT COMPLICATION, WITHOUT LONG-TERM CURRENT USE OF INSULIN (H): ICD-10-CM

## 2021-07-08 DIAGNOSIS — Z01.30 BLOOD PRESSURE CHECK: Primary | ICD-10-CM

## 2021-07-08 LAB — HBA1C MFR BLD: 8.3 % (ref 0–5.6)

## 2021-07-08 PROCEDURE — 99207 PR DROP WITH A PROCEDURE: CPT | Mod: 25

## 2021-07-08 PROCEDURE — 36415 COLL VENOUS BLD VENIPUNCTURE: CPT | Performed by: FAMILY MEDICINE

## 2021-07-08 PROCEDURE — 83036 HEMOGLOBIN GLYCOSYLATED A1C: CPT | Performed by: FAMILY MEDICINE

## 2021-07-08 NOTE — PROGRESS NOTES
Dago Dangelo is a 60 year old patient who comes in today for a Blood Pressure check.  Initial BP:  BP (!) 140/80   Pulse 76      76  Julieta Cantu LPN

## 2021-07-21 DIAGNOSIS — I21.4 NON-STEMI (NON-ST ELEVATED MYOCARDIAL INFARCTION) (H): ICD-10-CM

## 2021-07-21 DIAGNOSIS — I10 BENIGN ESSENTIAL HYPERTENSION: ICD-10-CM

## 2021-07-21 DIAGNOSIS — E11.9 TYPE 2 DIABETES MELLITUS WITHOUT COMPLICATION, WITHOUT LONG-TERM CURRENT USE OF INSULIN (H): ICD-10-CM

## 2021-07-22 RX ORDER — LOSARTAN POTASSIUM 100 MG/1
TABLET ORAL
Qty: 90 TABLET | Refills: 1 | Status: SHIPPED | OUTPATIENT
Start: 2021-07-22 | End: 2022-01-20

## 2021-07-22 RX ORDER — CARVEDILOL 25 MG/1
TABLET ORAL
Qty: 180 TABLET | Refills: 1 | Status: SHIPPED | OUTPATIENT
Start: 2021-07-22 | End: 2022-01-26

## 2021-07-22 NOTE — TELEPHONE ENCOUNTER
Prescription metformin approved per [facility] Refill Protocol.    Next 5 appointments (look out 90 days)    Sep 03, 2021  9:00 AM  Office Visit with Tl Ortiz MD  [facility] ([facility] [place] ) [address] [facility] [address] [phone]        Anitra Franklin RN

## 2021-07-22 NOTE — TELEPHONE ENCOUNTER
Routing refill request to provider for review/approval because:  Elevated BP. Request for 3 month refill.    BP Readings from Last 3 Encounters:   07/08/21 (!) 140/80   06/08/21 (!) 150/84   02/23/21 (!) 142/74      Next 5 appointments (look out 90 days)      Sep 03, 2021  9:00 AM  Office Visit with Tl Ortiz MD  [facility] ([facility] [place] ) [address] [facility] [address] [phone]          Anitra Franklin RN

## 2021-07-30 DIAGNOSIS — I10 BENIGN ESSENTIAL HYPERTENSION: ICD-10-CM

## 2021-07-30 RX ORDER — CHLORTHALIDONE 25 MG/1
25 TABLET ORAL DAILY
Qty: 30 TABLET | Refills: 1 | Status: SHIPPED | OUTPATIENT
Start: 2021-07-30 | End: 2021-10-08

## 2021-07-30 NOTE — TELEPHONE ENCOUNTER
chlorthalidone (HYGROTON) 25 MG tablet    Last Written Prescription Date:  6/8/21  Last Fill Quantity: 30,   # refills: 1  Last Office Visit: 6/8/21  Future Office visit:    Next 5 appointments (look out 90 days)    Sep 03, 2021  9:00 AM  Office Visit with Tl Ortiz MD  [facility] ([facility] [place] ) [address] [place] [id number]  [phone]           Routing refill request to provider for review/approval because:  Blood pressure out of range     Meagan Bran RN on 7/30/2021 at 1:02 PM

## 2021-10-08 DIAGNOSIS — I10 BENIGN ESSENTIAL HYPERTENSION: ICD-10-CM

## 2021-10-08 DIAGNOSIS — E11.9 TYPE 2 DIABETES MELLITUS WITHOUT COMPLICATION, WITHOUT LONG-TERM CURRENT USE OF INSULIN (H): ICD-10-CM

## 2021-10-08 DIAGNOSIS — E78.1 HYPERTRIGLYCERIDEMIA: ICD-10-CM

## 2021-10-08 RX ORDER — CHLORTHALIDONE 25 MG/1
25 TABLET ORAL DAILY
Qty: 30 TABLET | Refills: 1 | Status: SHIPPED | OUTPATIENT
Start: 2021-10-08 | End: 2021-10-12

## 2021-10-08 RX ORDER — GEMFIBROZIL 600 MG/1
TABLET, FILM COATED ORAL
Qty: 180 TABLET | Refills: 0 | Status: SHIPPED | OUTPATIENT
Start: 2021-10-08 | End: 2021-10-12

## 2021-10-08 NOTE — TELEPHONE ENCOUNTER
Last Office Visit: 6/8/21  Future Office visit:    Next 5 appointments (look out 90 days)    Oct 12, 2021  8:00 AM  Office Visit with Tl Ortiz MD  [facility] ([facility] [place] ) [address] [facility] [address] [phone]           Routing refill request to provider for review/approval because:  Blood pressure out of range    Patient has documented A1c within the specified period of time.       Meagan Bran RN on 10/8/2021 at 3:32 PM

## 2021-10-12 ENCOUNTER — OFFICE VISIT (OUTPATIENT)
Dept: FAMILY MEDICINE | Facility: CLINIC | Age: 61
End: 2021-10-12
Payer: COMMERCIAL

## 2021-10-12 VITALS
HEART RATE: 76 BPM | SYSTOLIC BLOOD PRESSURE: 130 MMHG | RESPIRATION RATE: 16 BRPM | TEMPERATURE: 98.4 F | WEIGHT: 182.7 LBS | OXYGEN SATURATION: 97 % | DIASTOLIC BLOOD PRESSURE: 70 MMHG | BODY MASS INDEX: 30.4 KG/M2

## 2021-10-12 DIAGNOSIS — E78.5 HYPERLIPIDEMIA LDL GOAL <70: ICD-10-CM

## 2021-10-12 DIAGNOSIS — E78.1 HYPERTRIGLYCERIDEMIA: ICD-10-CM

## 2021-10-12 DIAGNOSIS — I10 BENIGN ESSENTIAL HYPERTENSION: ICD-10-CM

## 2021-10-12 DIAGNOSIS — E11.9 TYPE 2 DIABETES MELLITUS WITHOUT COMPLICATION, WITHOUT LONG-TERM CURRENT USE OF INSULIN (H): Primary | ICD-10-CM

## 2021-10-12 LAB
ALT SERPL W P-5'-P-CCNC: 21 U/L (ref 0–70)
CHOLEST SERPL-MCNC: 199 MG/DL
FASTING STATUS PATIENT QL REPORTED: YES
HBA1C MFR BLD: 7.2 % (ref 0–5.6)
HDLC SERPL-MCNC: 35 MG/DL
LDLC SERPL CALC-MCNC: 92 MG/DL
NONHDLC SERPL-MCNC: 164 MG/DL
TRIGL SERPL-MCNC: 359 MG/DL

## 2021-10-12 PROCEDURE — 36415 COLL VENOUS BLD VENIPUNCTURE: CPT | Performed by: FAMILY MEDICINE

## 2021-10-12 PROCEDURE — 84460 ALANINE AMINO (ALT) (SGPT): CPT | Performed by: FAMILY MEDICINE

## 2021-10-12 PROCEDURE — 90471 IMMUNIZATION ADMIN: CPT | Performed by: FAMILY MEDICINE

## 2021-10-12 PROCEDURE — 90682 RIV4 VACC RECOMBINANT DNA IM: CPT | Performed by: FAMILY MEDICINE

## 2021-10-12 PROCEDURE — 80061 LIPID PANEL: CPT | Performed by: FAMILY MEDICINE

## 2021-10-12 PROCEDURE — 83036 HEMOGLOBIN GLYCOSYLATED A1C: CPT | Performed by: FAMILY MEDICINE

## 2021-10-12 PROCEDURE — 99214 OFFICE O/P EST MOD 30 MIN: CPT | Mod: 25 | Performed by: FAMILY MEDICINE

## 2021-10-12 RX ORDER — DAPAGLIFLOZIN 10 MG/1
10 TABLET, FILM COATED ORAL DAILY
Qty: 90 TABLET | Refills: 1 | Status: SHIPPED | OUTPATIENT
Start: 2021-10-12 | End: 2022-02-11

## 2021-10-12 RX ORDER — ATORVASTATIN CALCIUM 40 MG/1
40 TABLET, FILM COATED ORAL DAILY
Qty: 90 TABLET | Refills: 1 | Status: SHIPPED | OUTPATIENT
Start: 2021-10-12 | End: 2022-02-11

## 2021-10-12 RX ORDER — GEMFIBROZIL 600 MG/1
600 TABLET, FILM COATED ORAL 2 TIMES DAILY
Qty: 180 TABLET | Refills: 1 | Status: SHIPPED | OUTPATIENT
Start: 2021-10-12 | End: 2022-02-11

## 2021-10-12 RX ORDER — CHLORTHALIDONE 25 MG/1
25 TABLET ORAL DAILY
Qty: 30 TABLET | Refills: 1 | Status: SHIPPED | OUTPATIENT
Start: 2021-10-12 | End: 2022-01-27

## 2021-10-12 ASSESSMENT — PAIN SCALES - GENERAL: PAINLEVEL: NO PAIN (0)

## 2021-10-12 ASSESSMENT — ENCOUNTER SYMPTOMS
PARESTHESIAS: 0
PALPITATIONS: 0
SHORTNESS OF BREATH: 0
HEADACHES: 0
CONSTITUTIONAL NEGATIVE: 1

## 2021-10-12 NOTE — PROGRESS NOTES
Answers for HPI/ROS submitted by the patient on 10/12/2021  Frequency of checking blood sugars:: not at all  Diabetic concerns:: none  Paraesthesia present:: none of these symptoms  Have you had a diabetic eye exam within the last year?: No        Assessment and Plan    (E11.9) Type 2 diabetes mellitus without complication, without long-term current use of insulin (H)  (primary encounter diagnosis)  Comment: nearly at goal, adding dapaglifolozin  Plan: OPTOMETRY REFERRAL, Hemoglobin A1c, metFORMIN         (GLUCOPHAGE) 500 MG tablet, dapagliflozin         (FARXIGA) 10 MG TABS tablet            (E78.5) Hyperlipidemia LDL goal <70  Comment: fasting labs after restart of meds  Plan: Lipid panel reflex to direct LDL Fasting,         atorvastatin (LIPITOR) 40 MG tablet, ALT            (E78.1) Hypertriglyceridemia  Comment:   Plan: gemfibrozil (LOPID) 600 MG tablet, ALT            (I10) Benign essential hypertension  Comment:   Plan: chlorthalidone (HYGROTON) 25 MG tablet              RTC in  for DM check    Tl Ortiz MD      Kelley De La Cruz is a 60 year old who presents for the following health issues     History of Present Illness       Diabetes:   He presents for follow up of diabetes.  He is not checking blood glucose. He has no concerns regarding his diabetes at this time.  He is not experiencing numbness or burning in feet, excessive thirst, blurry vision, weight changes or redness, sores or blisters on feet. The patient has not had a diabetic eye exam in the last 12 months.         Feeling well on the whole.  NO issues with medication apart from some easy bruising due to Brilinta.  Denies CP, palpitations, edema, dyspnea, HA, vision changes.    Doesn't really eat well.  Doesn't really exercise.    Diabetes Follow-up      How often are you checking your blood sugar? Not at all    What concerns do you have today about your diabetes? None     Do you have any of these symptoms? (Select all that apply)  No  numbness or tingling in feet.  No redness, sores or blisters on feet.  No complaints of excessive thirst.  No reports of blurry vision.  No significant changes to weight.    Have you had a diabetic eye exam in the last 12 months? No        BP Readings from Last 2 Encounters:   10/12/21 130/70   07/08/21 (!) 140/80     Hemoglobin A1C (%)   Date Value   10/12/2021 7.2 (H)   07/08/2021 8.3 (H)   06/08/2021 9.8 (H)     LDL Cholesterol Calculated (mg/dL)   Date Value   06/08/2021     Cannot estimate LDL when triglyceride exceeds 400 mg/dL   01/22/2021     Cannot estimate LDL when triglyceride exceeds 400 mg/dL     LDL Cholesterol Direct (mg/dL)   Date Value   06/08/2021 168 (H)   01/22/2021 125 (H)                 How many servings of fruits and vegetables do you eat daily?  0-1    On average, how many sweetened beverages do you drink each day (Examples: soda, juice, sweet tea, etc.  Do NOT count diet or artificially sweetened beverages)?   0    How many days per week do you exercise enough to make your heart beat faster? 3 or less    How many minutes a day do you exercise enough to make your heart beat faster? 9 or less    How many days per week do you miss taking your medication? 0        Review of Systems   Constitutional: Negative.    Eyes: Negative for visual disturbance.   Respiratory: Negative for shortness of breath.    Cardiovascular: Negative for chest pain, palpitations and peripheral edema.   Neurological: Negative for headaches and paresthesias.            Objective    /70 (BP Location: Right arm, Patient Position: Sitting, Cuff Size: Adult Large)   Pulse 76   Temp 98.4  F (36.9  C) (Oral)   Resp 16   Wt 82.9 kg (182 lb 11.2 oz)   SpO2 97%   BMI 30.40 kg/m    Body mass index is 30.4 kg/m .  Physical Exam  Vitals reviewed.   Eyes:      Conjunctiva/sclera: Conjunctivae normal.   Cardiovascular:      Rate and Rhythm: Normal rate and regular rhythm.      Heart sounds: Normal heart sounds.    Pulmonary:      Effort: Pulmonary effort is normal.      Breath sounds: Normal breath sounds.   Skin:     General: Skin is warm and dry.   Neurological:      Mental Status: He is alert and oriented to person, place, and time.

## 2021-11-19 DIAGNOSIS — I21.4 NON-STEMI (NON-ST ELEVATED MYOCARDIAL INFARCTION) (H): ICD-10-CM

## 2021-11-19 RX ORDER — ASPIRIN 81 MG/1
TABLET, COATED ORAL
Qty: 30 TABLET | Refills: 10 | Status: SHIPPED | OUTPATIENT
Start: 2021-11-19 | End: 2022-11-10

## 2022-01-23 DIAGNOSIS — I21.4 NON-STEMI (NON-ST ELEVATED MYOCARDIAL INFARCTION) (H): ICD-10-CM

## 2022-01-26 RX ORDER — CARVEDILOL 25 MG/1
TABLET ORAL
Qty: 180 TABLET | Refills: 2 | Status: SHIPPED | OUTPATIENT
Start: 2022-01-26 | End: 2022-11-10

## 2022-01-26 NOTE — TELEPHONE ENCOUNTER
Routing refill request to provider for review/approval because:  Labs not current:  hgb, AST, PLT not done since 2018  Krishna ARIAS RN, BSN

## 2022-01-27 ENCOUNTER — MYC MEDICAL ADVICE (OUTPATIENT)
Dept: FAMILY MEDICINE | Facility: CLINIC | Age: 62
End: 2022-01-27
Payer: COMMERCIAL

## 2022-01-27 DIAGNOSIS — I10 BENIGN ESSENTIAL HYPERTENSION: ICD-10-CM

## 2022-01-27 RX ORDER — CHLORTHALIDONE 25 MG/1
TABLET ORAL
Qty: 30 TABLET | Refills: 1 | Status: SHIPPED | OUTPATIENT
Start: 2022-01-27 | End: 2022-02-11

## 2022-01-27 RX ORDER — TICAGRELOR 90 MG/1
TABLET ORAL
Qty: 60 TABLET | Refills: 0 | Status: SHIPPED | OUTPATIENT
Start: 2022-01-27 | End: 2022-02-11

## 2022-01-27 RX ORDER — LOSARTAN POTASSIUM 100 MG/1
TABLET ORAL
Qty: 90 TABLET | Refills: 1 | Status: SHIPPED | OUTPATIENT
Start: 2022-01-27 | End: 2022-10-17

## 2022-02-11 ENCOUNTER — OFFICE VISIT (OUTPATIENT)
Dept: FAMILY MEDICINE | Facility: CLINIC | Age: 62
End: 2022-02-11
Payer: COMMERCIAL

## 2022-02-11 VITALS
RESPIRATION RATE: 16 BRPM | HEART RATE: 76 BPM | WEIGHT: 179 LBS | OXYGEN SATURATION: 98 % | SYSTOLIC BLOOD PRESSURE: 112 MMHG | BODY MASS INDEX: 29.79 KG/M2 | DIASTOLIC BLOOD PRESSURE: 70 MMHG | TEMPERATURE: 98.2 F

## 2022-02-11 DIAGNOSIS — E78.1 HYPERTRIGLYCERIDEMIA: ICD-10-CM

## 2022-02-11 DIAGNOSIS — E11.9 TYPE 2 DIABETES MELLITUS WITHOUT COMPLICATION, WITHOUT LONG-TERM CURRENT USE OF INSULIN (H): Primary | ICD-10-CM

## 2022-02-11 DIAGNOSIS — E78.5 HYPERLIPIDEMIA LDL GOAL <70: ICD-10-CM

## 2022-02-11 DIAGNOSIS — I21.4 NON-STEMI (NON-ST ELEVATED MYOCARDIAL INFARCTION) (H): ICD-10-CM

## 2022-02-11 DIAGNOSIS — I10 BENIGN ESSENTIAL HYPERTENSION: ICD-10-CM

## 2022-02-11 LAB
ANION GAP SERPL CALCULATED.3IONS-SCNC: 7 MMOL/L (ref 3–14)
BUN SERPL-MCNC: 17 MG/DL (ref 7–30)
CALCIUM SERPL-MCNC: 9.4 MG/DL (ref 8.5–10.1)
CHLORIDE BLD-SCNC: 103 MMOL/L (ref 94–109)
CHOLEST SERPL-MCNC: 194 MG/DL
CO2 SERPL-SCNC: 25 MMOL/L (ref 20–32)
CREAT SERPL-MCNC: 0.8 MG/DL (ref 0.66–1.25)
CREAT UR-MCNC: 107 MG/DL
GFR SERPL CREATININE-BSD FRML MDRD: >90 ML/MIN/1.73M2
GLUCOSE BLD-MCNC: 171 MG/DL (ref 70–99)
HBA1C MFR BLD: 6.6 % (ref 0–5.6)
HDLC SERPL-MCNC: 36 MG/DL
LDLC SERPL CALC-MCNC: 110 MG/DL
MICROALBUMIN UR-MCNC: 49 MG/L
MICROALBUMIN/CREAT UR: 45.79 MG/G CR (ref 0–17)
NONHDLC SERPL-MCNC: 158 MG/DL
POTASSIUM BLD-SCNC: 4 MMOL/L (ref 3.4–5.3)
SODIUM SERPL-SCNC: 135 MMOL/L (ref 133–144)
TRIGL SERPL-MCNC: 241 MG/DL

## 2022-02-11 PROCEDURE — 82043 UR ALBUMIN QUANTITATIVE: CPT | Performed by: FAMILY MEDICINE

## 2022-02-11 PROCEDURE — 80048 BASIC METABOLIC PNL TOTAL CA: CPT | Performed by: FAMILY MEDICINE

## 2022-02-11 PROCEDURE — 99214 OFFICE O/P EST MOD 30 MIN: CPT | Performed by: FAMILY MEDICINE

## 2022-02-11 PROCEDURE — 99207 PR FOOT EXAM NO CHARGE: CPT | Mod: 25 | Performed by: FAMILY MEDICINE

## 2022-02-11 PROCEDURE — 36415 COLL VENOUS BLD VENIPUNCTURE: CPT | Performed by: FAMILY MEDICINE

## 2022-02-11 PROCEDURE — 83036 HEMOGLOBIN GLYCOSYLATED A1C: CPT | Performed by: FAMILY MEDICINE

## 2022-02-11 PROCEDURE — 80061 LIPID PANEL: CPT | Performed by: FAMILY MEDICINE

## 2022-02-11 RX ORDER — ATORVASTATIN CALCIUM 40 MG/1
40 TABLET, FILM COATED ORAL DAILY
Qty: 90 TABLET | Refills: 3 | Status: SHIPPED | OUTPATIENT
Start: 2022-02-11 | End: 2023-05-05

## 2022-02-11 RX ORDER — DAPAGLIFLOZIN 10 MG/1
10 TABLET, FILM COATED ORAL DAILY
Qty: 90 TABLET | Refills: 1 | Status: SHIPPED | OUTPATIENT
Start: 2022-02-11 | End: 2022-09-12

## 2022-02-11 RX ORDER — GEMFIBROZIL 600 MG/1
600 TABLET, FILM COATED ORAL 2 TIMES DAILY
Qty: 180 TABLET | Refills: 3 | Status: SHIPPED | OUTPATIENT
Start: 2022-02-11 | End: 2023-03-02

## 2022-02-11 RX ORDER — CHLORTHALIDONE 25 MG/1
25 TABLET ORAL DAILY
Qty: 90 TABLET | Refills: 1 | Status: SHIPPED | OUTPATIENT
Start: 2022-02-11 | End: 2022-10-17

## 2022-02-11 ASSESSMENT — ENCOUNTER SYMPTOMS
SHORTNESS OF BREATH: 0
CONSTITUTIONAL NEGATIVE: 1
HEADACHES: 0
PALPITATIONS: 0
PARESTHESIAS: 0
NUMBNESS: 0

## 2022-02-11 ASSESSMENT — PAIN SCALES - GENERAL: PAINLEVEL: NO PAIN (0)

## 2022-02-11 NOTE — PROGRESS NOTES
Assessment and Plan    (E11.9) Type 2 diabetes mellitus without complication, without long-term current use of insulin (H)  (primary encounter diagnosis)  Comment: will be getting eye exam soon  Plan: Hemoglobin A1c, Albumin Random Urine         Quantitative with Creat Ratio, Basic metabolic         panel  (Ca, Cl, CO2, Creat, Gluc, K, Na, BUN),         dapagliflozin (FARXIGA) 10 MG TABS tablet,         metFORMIN (GLUCOPHAGE) 500 MG tablet, FOOT EXAM            (I10) Benign essential hypertension  Comment: well controlled  Plan: Basic metabolic panel  (Ca, Cl, CO2, Creat,         Gluc, K, Na, BUN), chlorthalidone (HYGROTON) 25        MG tablet            (E78.5) Hyperlipidemia LDL goal <70  Comment: refilling  Plan: atorvastatin (LIPITOR) 40 MG tablet            (I21.4) Non-STEMI (non-ST elevated myocardial infarction) (H)  Comment:   Plan: ticagrelor (BRILINTA) 90 MG tablet            (E78.1) Hypertriglyceridemia  Comment: refilling  Plan: gemfibrozil (LOPID) 600 MG tablet              RTC in     Tl Ortiz MD      Kelley De La Cruz is a 61 year old who presents for the following health issues     History of Present Illness       Diabetes:   He presents for follow up of diabetes.  He is not checking blood glucose. He has no concerns regarding his diabetes at this time.  He is not experiencing numbness or burning in feet, excessive thirst, blurry vision, weight changes or redness, sores or blisters on feet. The patient has not had a diabetic eye exam in the last 12 months.         Hyperlipidemia:  He presents for follow up of hyperlipidemia.  He is taking medication to lower cholesterol. He is not having myalgia or other side effects to statin medications.    Hypertension: He presents for follow up of hypertension.  He does check blood pressure  regularly outside of the clinic. Outside blood pressures have been over 140/90. He follows a low salt diet.     Vascular Disease:  He presents for follow up of  vascular disease.  He takes daily aspirin.    He eats 0-1 servings of fruits and vegetables daily.He consumes 1 sweetened beverage(s) daily.He exercises with enough effort to increase his heart rate 9 or less minutes per day.  He exercises with enough effort to increase his heart rate 3 or less days per week. He is missing 1 dose(s) of medications per week.  He is not taking prescribed medications regularly due to remembering to take.     Has been having a bit of GERD, good relief with TUMs.  Denies CP, palpitations, edema, dyspnea, HA, vision changes.    Is working to set up ophtho appt soon.    Did have COVID booster in 12/21.    Review of Systems   Constitutional: Negative.    Eyes: Negative for visual disturbance.   Respiratory: Negative for shortness of breath.    Cardiovascular: Negative for chest pain, palpitations and peripheral edema.   Neurological: Negative for numbness, headaches and paresthesias.            Objective    /70 (BP Location: Right arm, Patient Position: Sitting, Cuff Size: Adult Regular)   Pulse 76   Temp 98.2  F (36.8  C) (Oral)   Resp 16   Wt 81.2 kg (179 lb)   SpO2 98%   BMI 29.79 kg/m    Body mass index is 29.79 kg/m .  Physical Exam  Vitals reviewed.   Eyes:      Conjunctiva/sclera: Conjunctivae normal.   Cardiovascular:      Rate and Rhythm: Normal rate and regular rhythm.      Pulses:           Dorsalis pedis pulses are 2+ on the right side and 2+ on the left side.      Heart sounds: Normal heart sounds.   Pulmonary:      Effort: Pulmonary effort is normal.      Breath sounds: Normal breath sounds.   Skin:     General: Skin is warm and dry.      Comments: Skin on feet healthy.  No wounds, callous, onychomycosis.   Neurological:      Mental Status: He is alert and oriented to person, place, and time.      Gait: Gait normal.      Deep Tendon Reflexes:      Reflex Scores:       Patellar reflexes are 2+ on the right side and 2+ on the left side.       Achilles reflexes are 2+  on the right side and 2+ on the left side.     Comments: Nl filament and proprioception in feet JOSIE

## 2022-02-13 ENCOUNTER — HEALTH MAINTENANCE LETTER (OUTPATIENT)
Age: 62
End: 2022-02-13

## 2022-04-25 ENCOUNTER — OFFICE VISIT (OUTPATIENT)
Dept: OPTOMETRY | Facility: CLINIC | Age: 62
End: 2022-04-25
Payer: COMMERCIAL

## 2022-04-25 DIAGNOSIS — E11.9 DIABETES MELLITUS WITHOUT OPHTHALMIC MANIFESTATIONS (H): ICD-10-CM

## 2022-04-25 DIAGNOSIS — E11.9 TYPE 2 DIABETES MELLITUS WITHOUT COMPLICATION, WITHOUT LONG-TERM CURRENT USE OF INSULIN (H): ICD-10-CM

## 2022-04-25 DIAGNOSIS — H25.13 NUCLEAR SCLEROSIS OF BOTH EYES: ICD-10-CM

## 2022-04-25 DIAGNOSIS — H52.03 HYPEROPIA OF BOTH EYES WITH ASTIGMATISM: Primary | ICD-10-CM

## 2022-04-25 DIAGNOSIS — H52.203 HYPEROPIA OF BOTH EYES WITH ASTIGMATISM: Primary | ICD-10-CM

## 2022-04-25 PROCEDURE — 92004 COMPRE OPH EXAM NEW PT 1/>: CPT | Performed by: OPTOMETRIST

## 2022-04-25 PROCEDURE — 92015 DETERMINE REFRACTIVE STATE: CPT | Performed by: OPTOMETRIST

## 2022-04-25 ASSESSMENT — VISUAL ACUITY
OS_SC: 20/40
OD_SC: 20/80
OS_SC: 20/25
METHOD: SNELLEN - LINEAR
OD_SC: 20/20
OS_SC+: -2

## 2022-04-25 ASSESSMENT — REFRACTION_MANIFEST
OS_CYLINDER: +0.75
OS_SPHERE: -0.25
OS_CYLINDER: +1.00
METHOD_AUTOREFRACTION: 1
OD_CYLINDER: +0.75
OD_CYLINDER: +0.75
OS_AXIS: 170
OD_ADD: +2.25
OD_SPHERE: +0.00
OD_AXIS: 016
OD_SPHERE: -0.25
OD_AXIS: 007
OS_SPHERE: PLANO
OS_ADD: +2.25
OS_AXIS: 162

## 2022-04-25 ASSESSMENT — KERATOMETRY
OS_K1POWER_DIOPTERS: 46.25
OD_AXISANGLE2_DEGREES: 126
OS_K2POWER_DIOPTERS: 46.75
OS_AXISANGLE2_DEGREES: 121
METHOD_AUTO_MANUAL: AUTOMATED
OD_K1POWER_DIOPTERS: 46
OD_AXISANGLE_DEGREES: 36
OD_K2POWER_DIOPTERS: 46.87
OS_AXISANGLE_DEGREES: 31

## 2022-04-25 ASSESSMENT — EXTERNAL EXAM - LEFT EYE: OS_EXAM: NORMAL

## 2022-04-25 ASSESSMENT — CONF VISUAL FIELD
OS_NORMAL: 1
METHOD: COUNTING FINGERS
OD_NORMAL: 1

## 2022-04-25 ASSESSMENT — SLIT LAMP EXAM - LIDS
COMMENTS: MEIBOMIAN GLAND DYSFUNCTION
COMMENTS: MEIBOMIAN GLAND DYSFUNCTION

## 2022-04-25 ASSESSMENT — EXTERNAL EXAM - RIGHT EYE: OD_EXAM: NORMAL

## 2022-04-25 ASSESSMENT — TONOMETRY
IOP_METHOD: APPLANATION
OD_IOP_MMHG: 19
OS_IOP_MMHG: 19

## 2022-04-25 ASSESSMENT — CUP TO DISC RATIO
OS_RATIO: 0.3
OD_RATIO: 0.35

## 2022-04-25 NOTE — LETTER
4/25/2022         RE: Dago Dangelo  [address]  [place] [address]        Dear Colleague,    Thank you for referring your patient, Dago Dangelo, to the [facility]. Please see a copy of my visit note below.    Chief Complaint   Patient presents with     Diabetic Eye Exam       Chief Complaint(s) and History of Present Illness(es)     Diabetic Eye Exam     Vision: is stable    Diabetes Type: Type 2 and taking oral medications    Blood Sugars: is controlled               Lab Results   Component Value Date    A1C 6.6 02/11/2022    A1C 7.2 10/12/2021    A1C 8.3 07/08/2021    A1C 9.8 06/08/2021    A1C 9.0 01/22/2021    A1C 7.4 08/17/2018    A1C 7.6 02/20/2018            Last Eye Exam: 2018  Dilated Previously: Yes, side effects of dilation explained today    What are you currently using to see?  readers    Distance Vision Acuity: Satisfied with vision    Near Vision Acuity: Satisfied with vision while reading and using computer with readers    Eye Comfort: good  Do you use eye drops? : No  Occupation or Hobbies: Retired    Abida Plasencia - Optometric Assistant      Medical, surgical and family histories reviewed and updated 4/25/2022.       OBJECTIVE: See Ophthalmology exam    ASSESSMENT:    ICD-10-CM    1. Type 2 diabetes mellitus without complication, without long-term current use of insulin (H)  E11.9 OPTOMETRY REFERRAL       PLAN:  Content w/ readers  Glucose control to prevent retinopathy   Dago Dangelo aware  eye exam results will be sent to Tl Ortiz.    Keara Stanton OD           Again, thank you for allowing me to participate in the care of your patient.        Sincerely,        Keara Stanton, OD

## 2022-04-25 NOTE — PROGRESS NOTES
Chief Complaint   Patient presents with     Diabetic Eye Exam       Chief Complaint(s) and History of Present Illness(es)     Diabetic Eye Exam     Vision: is stable    Diabetes Type: Type 2 and taking oral medications    Blood Sugars: is controlled               Lab Results   Component Value Date    A1C 6.6 02/11/2022    A1C 7.2 10/12/2021    A1C 8.3 07/08/2021    A1C 9.8 06/08/2021    A1C 9.0 01/22/2021    A1C 7.4 08/17/2018    A1C 7.6 02/20/2018            Last Eye Exam: 2018  Dilated Previously: Yes, side effects of dilation explained today    What are you currently using to see?  readers    Distance Vision Acuity: Satisfied with vision    Near Vision Acuity: Satisfied with vision while reading and using computer with readers    Eye Comfort: good  Do you use eye drops? : No  Occupation or Hobbies: Retired    Abida Plasencia - Optometric Assistant      Medical, surgical and family histories reviewed and updated 4/25/2022.       OBJECTIVE: See Ophthalmology exam    ASSESSMENT:    ICD-10-CM    1. Type 2 diabetes mellitus without complication, without long-term current use of insulin (H)  E11.9 OPTOMETRY REFERRAL       PLAN:  Content w/ readers  Glucose control to prevent retinopathy   Dago Dangelo aware  eye exam results will be sent to Tl Ortiz.    Keara Stanton OD

## 2022-04-25 NOTE — PATIENT INSTRUCTIONS
Patient Education   Diabetes weakens the blood vessels all over the body, including the eyes. Damage to the blood vessels in the eyes can cause swelling or bleeding into part of the eye (called the retina). This is called diabetic retinopathy (FAUSTINO-tin--pu-thee). If not treated, this disease can cause vision loss or blindness.   Symptoms may include blurred or distorted vision, but many people have no symptoms. It's important to see your eye doctor regularly to check for problems.   Early treatment and good control can help protect your vision. Here are the things you can do to help prevent vision loss:      1. Keep your blood sugar levels under tight control.      2. Bring high blood pressure under control.      3. No smoking.      4. Have yearly dilated eye exams.    Okay to continue w/ readers, you do not need distance prescription correction     Early start of cataracts

## 2022-05-12 ENCOUNTER — OFFICE VISIT (OUTPATIENT)
Dept: FAMILY MEDICINE | Facility: CLINIC | Age: 62
End: 2022-05-12
Payer: COMMERCIAL

## 2022-05-12 VITALS
HEART RATE: 64 BPM | DIASTOLIC BLOOD PRESSURE: 68 MMHG | WEIGHT: 180 LBS | BODY MASS INDEX: 29.99 KG/M2 | RESPIRATION RATE: 18 BRPM | TEMPERATURE: 97.7 F | HEIGHT: 65 IN | SYSTOLIC BLOOD PRESSURE: 114 MMHG | OXYGEN SATURATION: 97 %

## 2022-05-12 DIAGNOSIS — E11.29 TYPE 2 DIABETES MELLITUS WITH MICROALBUMINURIA, WITHOUT LONG-TERM CURRENT USE OF INSULIN (H): Primary | ICD-10-CM

## 2022-05-12 DIAGNOSIS — N18.1 CHRONIC KIDNEY DISEASE, STAGE 1: ICD-10-CM

## 2022-05-12 DIAGNOSIS — R80.9 TYPE 2 DIABETES MELLITUS WITH MICROALBUMINURIA, WITHOUT LONG-TERM CURRENT USE OF INSULIN (H): Primary | ICD-10-CM

## 2022-05-12 LAB — HBA1C MFR BLD: 6.9 % (ref 0–5.6)

## 2022-05-12 PROCEDURE — 90670 PCV13 VACCINE IM: CPT | Performed by: FAMILY MEDICINE

## 2022-05-12 PROCEDURE — 83036 HEMOGLOBIN GLYCOSYLATED A1C: CPT | Performed by: FAMILY MEDICINE

## 2022-05-12 PROCEDURE — 99213 OFFICE O/P EST LOW 20 MIN: CPT | Mod: 25 | Performed by: FAMILY MEDICINE

## 2022-05-12 PROCEDURE — 36415 COLL VENOUS BLD VENIPUNCTURE: CPT | Performed by: FAMILY MEDICINE

## 2022-05-12 PROCEDURE — 90471 IMMUNIZATION ADMIN: CPT | Performed by: FAMILY MEDICINE

## 2022-05-12 ASSESSMENT — ENCOUNTER SYMPTOMS
PARESTHESIAS: 0
PALPITATIONS: 0
NUMBNESS: 0
CONSTITUTIONAL NEGATIVE: 1
HEADACHES: 0
SHORTNESS OF BREATH: 0

## 2022-05-12 ASSESSMENT — PAIN SCALES - GENERAL: PAINLEVEL: NO PAIN (0)

## 2022-05-12 NOTE — PROGRESS NOTES
Assessment and Plan    (E11.29,  R80.9) Type 2 diabetes mellitus with microalbuminuria, without long-term current use of insulin (H)  (primary encounter diagnosis)  Comment: Trending at goal well arrange for lab visit in 3m  Plan: **A1C FUTURE 3mo            (N18.1) Chronic kidney disease, stage 1  Comment: notig for PL, microalbuminuria  Plan:       RTC in 6m for CPE    Tl Ortiz MD      Kelley De La Cruz is a 61 year old who presents for the following health issues     History of Present Illness       Diabetes:   He presents for follow up of diabetes.  He is not checking blood glucose. He has no concerns regarding his diabetes at this time.  He is not experiencing numbness or burning in feet, excessive thirst, blurry vision, weight changes or redness, sores or blisters on feet.         Heart Failure:  He presents for follow up of heart failure. He is not experiencing shortness of breath at night, with rest or with activity He is not experiencing any lower extremity edema.   He denies orthopenea and coughs at night. Patient is not checking weight daily. He has recently had a None. He has no side effects from medications.  He has had no other medical visits for heart failure since the last visit.    Hyperlipidemia:  He presents for follow up of hyperlipidemia.  He is taking medication to lower cholesterol. He is not having myalgia or other side effects to statin medications.    Hypertension: He presents for follow up of hypertension.  He does check blood pressure  regularly outside of the clinic. Outside blood pressures have been over 140/90. He follows a low salt diet.     He eats 0-1 servings of fruits and vegetables daily.He consumes 0 sweetened beverage(s) daily.He exercises with enough effort to increase his heart rate 9 or less minutes per day.  He exercises with enough effort to increase his heart rate 3 or less days per week.   He is taking medications regularly.     Did recently have eye exam,  "everything there looked good.  Feeling well, no acute concerns.    Denies CP, palpitations, edema, dyspnea, HA, vision changes.    Has had 3rd COVID shoot.    Review of Systems   Constitutional: Negative.    Eyes: Negative for visual disturbance.   Respiratory: Negative for shortness of breath.    Cardiovascular: Negative for chest pain, palpitations and peripheral edema.   Neurological: Negative for numbness, headaches and paresthesias.            Objective    /68   Pulse 64   Temp 97.7  F (36.5  C) (Oral)   Resp 18   Ht 1.651 m (5' 5\")   Wt 81.6 kg (180 lb)   SpO2 97%   BMI 29.95 kg/m    Body mass index is 29.95 kg/m .  Physical Exam  Vitals and nursing note reviewed.   HENT:      Head: Normocephalic.   Eyes:      Conjunctiva/sclera: Conjunctivae normal.   Cardiovascular:      Rate and Rhythm: Normal rate and regular rhythm.      Heart sounds: Normal heart sounds.   Pulmonary:      Effort: Pulmonary effort is normal.      Breath sounds: Normal breath sounds.   Skin:     General: Skin is warm and dry.   Neurological:      General: No focal deficit present.      Mental Status: He is alert and oriented to person, place, and time.   Psychiatric:         Mood and Affect: Mood normal.         Behavior: Behavior normal.                        "

## 2022-08-12 ENCOUNTER — LAB (OUTPATIENT)
Dept: LAB | Facility: CLINIC | Age: 62
End: 2022-08-12
Payer: COMMERCIAL

## 2022-08-12 DIAGNOSIS — E11.29 TYPE 2 DIABETES MELLITUS WITH MICROALBUMINURIA, WITHOUT LONG-TERM CURRENT USE OF INSULIN (H): ICD-10-CM

## 2022-08-12 DIAGNOSIS — R80.9 TYPE 2 DIABETES MELLITUS WITH MICROALBUMINURIA, WITHOUT LONG-TERM CURRENT USE OF INSULIN (H): ICD-10-CM

## 2022-08-12 LAB — HBA1C MFR BLD: 6.7 % (ref 0–5.6)

## 2022-08-12 PROCEDURE — 83036 HEMOGLOBIN GLYCOSYLATED A1C: CPT

## 2022-08-12 PROCEDURE — 36415 COLL VENOUS BLD VENIPUNCTURE: CPT

## 2022-10-13 DIAGNOSIS — I10 BENIGN ESSENTIAL HYPERTENSION: ICD-10-CM

## 2022-10-13 DIAGNOSIS — E11.9 TYPE 2 DIABETES MELLITUS WITHOUT COMPLICATION, WITHOUT LONG-TERM CURRENT USE OF INSULIN (H): ICD-10-CM

## 2022-10-15 ENCOUNTER — HEALTH MAINTENANCE LETTER (OUTPATIENT)
Age: 62
End: 2022-10-15

## 2022-10-17 RX ORDER — CHLORTHALIDONE 25 MG/1
TABLET ORAL
Qty: 90 TABLET | Refills: 0 | Status: SHIPPED | OUTPATIENT
Start: 2022-10-17 | End: 2022-12-01

## 2022-10-17 RX ORDER — LOSARTAN POTASSIUM 100 MG/1
TABLET ORAL
Qty: 90 TABLET | Refills: 0 | Status: SHIPPED | OUTPATIENT
Start: 2022-10-17 | End: 2022-12-01

## 2022-10-17 NOTE — TELEPHONE ENCOUNTER
Prescription approved per [facility] Refill Protocol.  Dena Eldrdige, RN, BSN, PHN  [facility]

## 2022-11-09 DIAGNOSIS — I21.4 NON-STEMI (NON-ST ELEVATED MYOCARDIAL INFARCTION) (H): ICD-10-CM

## 2022-11-10 RX ORDER — CARVEDILOL 25 MG/1
TABLET ORAL
Qty: 180 TABLET | Refills: 0 | Status: SHIPPED | OUTPATIENT
Start: 2022-11-10 | End: 2022-12-01

## 2022-11-10 RX ORDER — ASPIRIN 81 MG/1
TABLET, COATED ORAL
Qty: 90 TABLET | Refills: 0 | Status: SHIPPED | OUTPATIENT
Start: 2022-11-10 | End: 2023-02-27

## 2022-11-10 NOTE — TELEPHONE ENCOUNTER
Prescription approved per [facility] Refill Protocol.  Next 5 appointments (look out 90 days)    Dec 01, 2022  1:00 PM  (Arrive by 12:40 PM)  Adult Preventative Visit with Tl Ortiz MD  [facility] ([facility] ) [id number] [place] [id number] [phone]

## 2022-12-01 ENCOUNTER — OFFICE VISIT (OUTPATIENT)
Dept: FAMILY MEDICINE | Facility: CLINIC | Age: 62
End: 2022-12-01
Payer: COMMERCIAL

## 2022-12-01 VITALS
HEART RATE: 69 BPM | SYSTOLIC BLOOD PRESSURE: 118 MMHG | BODY MASS INDEX: 29.89 KG/M2 | RESPIRATION RATE: 18 BRPM | WEIGHT: 179.4 LBS | TEMPERATURE: 98.2 F | DIASTOLIC BLOOD PRESSURE: 72 MMHG | HEIGHT: 65 IN | OXYGEN SATURATION: 96 %

## 2022-12-01 DIAGNOSIS — R80.9 TYPE 2 DIABETES MELLITUS WITH MICROALBUMINURIA, WITHOUT LONG-TERM CURRENT USE OF INSULIN (H): ICD-10-CM

## 2022-12-01 DIAGNOSIS — E11.29 TYPE 2 DIABETES MELLITUS WITH MICROALBUMINURIA, WITHOUT LONG-TERM CURRENT USE OF INSULIN (H): ICD-10-CM

## 2022-12-01 DIAGNOSIS — E11.9 TYPE 2 DIABETES MELLITUS WITHOUT COMPLICATION, WITHOUT LONG-TERM CURRENT USE OF INSULIN (H): Primary | ICD-10-CM

## 2022-12-01 DIAGNOSIS — I10 BENIGN ESSENTIAL HYPERTENSION: ICD-10-CM

## 2022-12-01 LAB — HBA1C MFR BLD: 6.7 % (ref 0–5.6)

## 2022-12-01 PROCEDURE — 0124A COVID-19 VACCINE BIVALENT BOOSTER 12+ (PFIZER): CPT | Performed by: FAMILY MEDICINE

## 2022-12-01 PROCEDURE — 99214 OFFICE O/P EST MOD 30 MIN: CPT | Performed by: FAMILY MEDICINE

## 2022-12-01 PROCEDURE — 36415 COLL VENOUS BLD VENIPUNCTURE: CPT | Performed by: FAMILY MEDICINE

## 2022-12-01 PROCEDURE — 91312 COVID-19 VACCINE BIVALENT BOOSTER 12+ (PFIZER): CPT | Performed by: FAMILY MEDICINE

## 2022-12-01 PROCEDURE — 83036 HEMOGLOBIN GLYCOSYLATED A1C: CPT | Performed by: FAMILY MEDICINE

## 2022-12-01 RX ORDER — LOSARTAN POTASSIUM 100 MG/1
100 TABLET ORAL DAILY
Qty: 90 TABLET | Refills: 0 | Status: SHIPPED | OUTPATIENT
Start: 2022-12-01 | End: 2023-03-02

## 2022-12-01 RX ORDER — CHLORTHALIDONE 25 MG/1
25 TABLET ORAL DAILY
Qty: 90 TABLET | Refills: 0 | Status: SHIPPED | OUTPATIENT
Start: 2022-12-01 | End: 2023-03-02

## 2022-12-01 RX ORDER — CARVEDILOL 25 MG/1
25 TABLET ORAL 2 TIMES DAILY WITH MEALS
Qty: 180 TABLET | Refills: 0 | Status: SHIPPED | OUTPATIENT
Start: 2022-12-01 | End: 2023-03-02

## 2022-12-01 ASSESSMENT — ENCOUNTER SYMPTOMS
FREQUENCY: 1
DIARRHEA: 0
SORE THROAT: 0
ABDOMINAL PAIN: 0
FEVER: 0
CONSTIPATION: 0
NERVOUS/ANXIOUS: 0
CHILLS: 0
EYE PAIN: 0
JOINT SWELLING: 0
NAUSEA: 0
ARTHRALGIAS: 0
WEAKNESS: 0
DYSURIA: 0
PARESTHESIAS: 0
HEMATURIA: 0
SHORTNESS OF BREATH: 0
HEARTBURN: 0
HEADACHES: 0
COUGH: 1
PALPITATIONS: 0
DIZZINESS: 0
HEMATOCHEZIA: 0
MYALGIAS: 0

## 2022-12-01 NOTE — PROGRESS NOTES
SUBJECTIVE:   CC: Jono is an 62 year old who presents for preventative health visit.   Patient has been advised of split billing requirements and indicates understanding: Yes  Healthy Habits:     Getting at least 3 servings of Calcium per day:  NO    Bi-annual eye exam:  Yes    Dental care twice a year:  NO    Sleep apnea or symptoms of sleep apnea:  None    Diet:  Low salt and Low fat/cholesterol    Frequency of exercise:  None    Taking medications regularly:  Yes    Medication side effects:  None    PHQ-2 Total Score: 0    Additional concerns today:  No      Diabetes Follow-up      How often are you checking your blood sugar? Not at all    What concerns do you have today about your diabetes? None     Do you have any of these symptoms? (Select all that apply)  No numbness or tingling in feet.  No redness, sores or blisters on feet.  No complaints of excessive thirst.  No reports of blurry vision.  No significant changes to weight.      BP Readings from Last 2 Encounters:   12/01/22 118/72   05/12/22 114/68     Hemoglobin A1C (%)   Date Value   12/01/2022 6.7 (H)   08/12/2022 6.7 (H)   07/08/2021 8.3 (H)   06/08/2021 9.8 (H)     LDL Cholesterol Calculated (mg/dL)   Date Value   02/11/2022 110 (H)   10/12/2021 92   06/08/2021     Cannot estimate LDL when triglyceride exceeds 400 mg/dL   01/22/2021     Cannot estimate LDL when triglyceride exceeds 400 mg/dL     LDL Cholesterol Direct (mg/dL)   Date Value   06/08/2021 168 (H)   01/22/2021 125 (H)           Today's PHQ-2 Score:   PHQ-2 ( 1999 [employer]) 12/1/2022   Q1: Little interest or pleasure in doing things 0   Q2: Feeling down, depressed or hopeless 0   PHQ-2 Score 0   PHQ-2 Total Score (12-17 Years)- Positive if 3 or more points; Administer PHQ-A if positive -   Q1: Little interest or pleasure in doing things Not at all   Q2: Feeling down, depressed or hopeless Not at all   PHQ-2 Score 0           Social History     Tobacco Use     Smoking status: Every Day      "Packs/day: 1.00     Years: 30.00     Pack years: 30.00     Types: Cigarettes     Smokeless tobacco: Never     Tobacco comments:     may have one once weekly   Substance Use Topics     Alcohol use: Yes     Comment: rare, 1 monthly     If you drink alcohol do you typically have >3 drinks per day or >7 drinks per week? No    Alcohol Use 12/1/2022   Prescreen: >3 drinks/day or >7 drinks/week? Yes   Prescreen: >3 drinks/day or >7 drinks/week? -   AUDIT SCORE  8       Last PSA: No results found for: PSA    Reviewed orders with patient. Reviewed health maintenance and updated orders accordingly - Yes  Lab work is in process  Labs reviewed in EPIC    Reviewed and updated as needed this visit by clinical staff   Tobacco  Allergies  Meds              Reviewed and updated as needed this visit by Provider                 Feeling well, no specific concerns.    Review of Systems   Constitutional: Negative for chills and fever.   HENT: Positive for congestion. Negative for ear pain, hearing loss and sore throat.    Eyes: Negative for pain and visual disturbance.   Respiratory: Positive for cough. Negative for shortness of breath.    Cardiovascular: Negative for chest pain, palpitations and peripheral edema.   Gastrointestinal: Negative for abdominal pain, constipation, diarrhea, heartburn, hematochezia and nausea.   Genitourinary: Positive for frequency. Negative for dysuria, genital sores, hematuria, impotence, penile discharge and urgency.   Musculoskeletal: Negative for arthralgias, joint swelling and myalgias.   Skin: Negative for rash.   Neurological: Negative for dizziness, weakness, headaches and paresthesias.   Psychiatric/Behavioral: Negative for mood changes. The patient is not nervous/anxious.          OBJECTIVE:   /72 (BP Location: Right arm, Patient Position: Sitting, Cuff Size: Adult Regular)   Pulse 69   Temp 98.2  F (36.8  C) (Tympanic)   Resp 18   Ht 1.651 m (5' 5\")   Wt 81.4 kg (179 lb 6.4 oz)   " "SpO2 96%   BMI 29.85 kg/m      Physical Exam  Vitals and nursing note reviewed.   Constitutional:       General: He is not in acute distress.     Appearance: He is well-developed and well-nourished.   Cardiovascular:      Rate and Rhythm: Normal rate and regular rhythm.      Heart sounds: Normal heart sounds.   Pulmonary:      Effort: Pulmonary effort is normal.      Breath sounds: Normal breath sounds.   Skin:     General: Skin is warm and dry.   Neurological:      Mental Status: He is alert and oriented to person, place, and time.   Psychiatric:         Mood and Affect: Mood and affect normal.         Judgment: Judgment normal.           Diagnostic Test Results:  Labs reviewed in Epic    ASSESSMENT/PLAN:     (E11.29,  R80.9) Type 2 diabetes mellitus with microalbuminuria, without long-term current use of insulin (H)  Comment: Well controlled over all  Plan: Hemoglobin A1c        Metformin    (I10) Benign essential hypertension  Comment:   Plan: carvedilol (COREG) 25 MG tablet, chlorthalidone        (HYGROTON) 25 MG tablet, losartan (COZAAR) 100         MG tablet                    COUNSELING:   Reviewed preventive health counseling, as reflected in patient instructions      BMI:   Estimated body mass index is 29.85 kg/m  as calculated from the following:    Height as of this encounter: 1.651 m (5' 5\").    Weight as of this encounter: 81.4 kg (179 lb 6.4 oz).   Weight management plan: Discussed healthy diet and exercise guidelines      He reports that he has been smoking cigarettes. He has a 30.00 pack-year smoking history. He has never used smokeless tobacco.  Nicotine/Tobacco Cessation Plan:   Information offered: Patient not interested at this time        Tl Ortiz MD  [facility]  "

## 2023-01-19 ENCOUNTER — APPOINTMENT (OUTPATIENT)
Dept: GENERAL RADIOLOGY | Facility: CLINIC | Age: 63
End: 2023-01-19
Attending: EMERGENCY MEDICINE
Payer: COMMERCIAL

## 2023-01-19 ENCOUNTER — HOSPITAL ENCOUNTER (EMERGENCY)
Facility: CLINIC | Age: 63
Discharge: HOME | End: 2023-01-20
Attending: EMERGENCY MEDICINE | Admitting: EMERGENCY MEDICINE
Payer: COMMERCIAL

## 2023-01-19 ENCOUNTER — APPOINTMENT (OUTPATIENT)
Dept: GENERAL RADIOLOGY | Facility: CLINIC | Age: 63
End: 2023-01-19
Attending: PHYSICIAN ASSISTANT
Payer: COMMERCIAL
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
## 2023-01-19 DIAGNOSIS — S42.401A CLOSED FRACTURE DISLOCATION OF RIGHT ELBOW, INITIAL ENCOUNTER: ICD-10-CM

## 2023-01-19 PROCEDURE — 999N000157 HC STATISTIC RCP TIME EA 10 MIN

## 2023-01-19 PROCEDURE — 24600 TX CLSD ELBOW DISLC W/O ANES: CPT | Mod: RT

## 2023-01-19 PROCEDURE — 999N000055 HC STATISTIC END TITIAL CO2 MONITORING

## 2023-01-19 PROCEDURE — 96375 TX/PRO/DX INJ NEW DRUG ADDON: CPT | Mod: 59

## 2023-01-19 PROCEDURE — 73080 X-RAY EXAM OF ELBOW: CPT | Mod: RT

## 2023-01-19 PROCEDURE — 96374 THER/PROPH/DIAG INJ IV PUSH: CPT | Mod: 59

## 2023-01-19 PROCEDURE — 250N000011 HC RX IP 250 OP 636: Performed by: EMERGENCY MEDICINE

## 2023-01-19 PROCEDURE — 999N000065 XR ELBOW RIGHT 2 VIEWS: Mod: RT

## 2023-01-19 PROCEDURE — 73060 X-RAY EXAM OF HUMERUS: CPT | Mod: RT

## 2023-01-19 PROCEDURE — 99285 EMERGENCY DEPT VISIT HI MDM: CPT | Mod: 25

## 2023-01-19 PROCEDURE — 96376 TX/PRO/DX INJ SAME DRUG ADON: CPT | Mod: 59

## 2023-01-19 PROCEDURE — 999N000065 XR FOREARM RIGHT 2 VIEWS: Mod: RT

## 2023-01-19 RX ORDER — PROPOFOL 10 MG/ML
20 INJECTION, EMULSION INTRAVENOUS ONCE
Status: COMPLETED | OUTPATIENT
Start: 2023-01-19 | End: 2023-01-19

## 2023-01-19 RX ORDER — PROPOFOL 10 MG/ML
100 INJECTION, EMULSION INTRAVENOUS ONCE
Status: COMPLETED | OUTPATIENT
Start: 2023-01-19 | End: 2023-01-19

## 2023-01-19 RX ORDER — OXYCODONE HYDROCHLORIDE 5 MG/1
5 TABLET ORAL ONCE
Status: COMPLETED | OUTPATIENT
Start: 2023-01-19 | End: 2023-01-19

## 2023-01-19 RX ORDER — HYDROMORPHONE HYDROCHLORIDE 1 MG/ML
0.5 INJECTION, SOLUTION INTRAMUSCULAR; INTRAVENOUS; SUBCUTANEOUS ONCE
Status: COMPLETED | OUTPATIENT
Start: 2023-01-19 | End: 2023-01-19

## 2023-01-19 RX ADMIN — HYDROMORPHONE HYDROCHLORIDE 0.5 MG: 1 INJECTION, SOLUTION INTRAMUSCULAR; INTRAVENOUS; SUBCUTANEOUS at 21:16

## 2023-01-19 RX ADMIN — PROPOFOL 20 MG: 10 INJECTION, EMULSION INTRAVENOUS at 21:49

## 2023-01-19 RX ADMIN — PROPOFOL 80 MG: 10 INJECTION, EMULSION INTRAVENOUS at 21:47

## 2023-01-19 RX ADMIN — HYDROMORPHONE HYDROCHLORIDE 0.5 MG: 1 INJECTION, SOLUTION INTRAMUSCULAR; INTRAVENOUS; SUBCUTANEOUS at 22:37

## 2023-01-19 RX ADMIN — PROPOFOL 100 MG: 10 INJECTION, EMULSION INTRAVENOUS at 23:04

## 2023-01-19 RX ADMIN — PROPOFOL 100 MG: 10 INJECTION, EMULSION INTRAVENOUS at 23:08

## 2023-01-19 ASSESSMENT — ACTIVITIES OF DAILY LIVING (ADL)
ADLS_ACUITY_SCORE: 35
ADLS_ACUITY_SCORE: 33

## 2023-01-20 ENCOUNTER — APPOINTMENT (OUTPATIENT)
Dept: CT IMAGING | Facility: CLINIC | Age: 63
End: 2023-01-20
Attending: PHYSICIAN ASSISTANT
Payer: COMMERCIAL

## 2023-01-20 VITALS
HEART RATE: 76 BPM | OXYGEN SATURATION: 99 % | SYSTOLIC BLOOD PRESSURE: 142 MMHG | RESPIRATION RATE: 18 BRPM | TEMPERATURE: 98.1 F | DIASTOLIC BLOOD PRESSURE: 76 MMHG

## 2023-01-20 PROCEDURE — 96376 TX/PRO/DX INJ SAME DRUG ADON: CPT | Mod: 59

## 2023-01-20 PROCEDURE — 73200 CT UPPER EXTREMITY W/O DYE: CPT | Mod: RT

## 2023-01-20 PROCEDURE — 250N000011 HC RX IP 250 OP 636: Performed by: PHYSICIAN ASSISTANT

## 2023-01-20 RX ORDER — OXYCODONE AND ACETAMINOPHEN 5; 325 MG/1; MG/1
1 TABLET ORAL EVERY 6 HOURS PRN
Qty: 10 TABLET | Refills: 0 | Status: SHIPPED | OUTPATIENT
Start: 2023-01-20 | End: 2023-01-23

## 2023-01-20 RX ORDER — HYDROMORPHONE HYDROCHLORIDE 1 MG/ML
0.5 INJECTION, SOLUTION INTRAMUSCULAR; INTRAVENOUS; SUBCUTANEOUS ONCE
Status: COMPLETED | OUTPATIENT
Start: 2023-01-20 | End: 2023-01-20

## 2023-01-20 RX ADMIN — HYDROMORPHONE HYDROCHLORIDE 0.5 MG: 1 INJECTION, SOLUTION INTRAMUSCULAR; INTRAVENOUS; SUBCUTANEOUS at 00:10

## 2023-01-20 ASSESSMENT — ENCOUNTER SYMPTOMS
NECK PAIN: 0
NUMBNESS: 0
HEADACHES: 0
VOMITING: 0
ARTHRALGIAS: 1
MYALGIAS: 1
JOINT SWELLING: 1
SHORTNESS OF BREATH: 0

## 2023-01-20 ASSESSMENT — ACTIVITIES OF DAILY LIVING (ADL): ADLS_ACUITY_SCORE: 35

## 2023-01-20 NOTE — ED PROVIDER NOTES
History     Chief Complaint:  Elbow Injury       The history is provided by the patient.      Dago Dangleo is a 62 year old right-handed male who presents with elbow injury. The patient reports that he was using his snow-blower when he slipped on ice and fell sideways directly onto his right elbow. He states that this occurred today around 1230.  He took 4 ibuprofen prior to arrival. Patient's right elbow appears deformed and swollen. He denies numbness or tingling in fingers or hand. His right hand and fingers are also swollen. He reports pain in his right elbow with any movement of the forearm or wrist. He denies hitting his head. He denies headache, loss of consciousness, neck pain, vomiting, chest pain, shortness of breath, or any wounds. He endorses use of Brilinta due to prior MI.     Independent Historian: The patient was the sole provider of history.      ROS:  Review of Systems   Respiratory: Negative for shortness of breath.    Cardiovascular: Negative for chest pain.   Gastrointestinal: Negative for vomiting.   Musculoskeletal: Positive for arthralgias, joint swelling and myalgias. Negative for neck pain.   Neurological: Negative for numbness and headaches.   All other systems reviewed and are negative.    Allergies:  No known drug allergies    Medications:    Aspirin 81 mg  Lipitor  Coreg  Brilinta  Hygroton  Farxiga  Lopid  Cozaar  Metformin  Nitroglycerin    Past Medical History:    Arthritis  CAD  Diabetes mellitus  Hypertension  Hyperlipidemia  NSTEMI  Tobacco dependence    Past Surgical History:    Colonoscopy  Stent, coronary, KIARRA=OM1, Mid L Cx     Family History:    Father: Cerebrovascular disease, hyperlipidemia, hypertension  Mother: Glaucoma, hyperlipidemia, hypertension  Sister: Colon cancer, diabetes, hypertension, other cancer  Brother: Cerebrovascular disease, CAD    Social History:  The patient presents to the ED with his sister. They arrived via private vehicle. Records report that  he has been smoking cigarettes. He has a 30.00 pack-year smoking history. He has never used smokeless tobacco. He reports current alcohol use. He reports that he does not use drugs.  PCP: Tl Ortiz     Physical Exam     Patient Vitals for the past 24 hrs:   BP Temp Temp src Pulse Resp SpO2   01/20/23 0148 (!) 142/76 -- -- 76 18 99 %   01/19/23 2350 (!) 150/78 -- -- 80 13 100 %   01/19/23 2345 (!) 151/83 -- -- 82 18 100 %   01/19/23 2340 (!) 151/77 -- -- 79 16 100 %   01/19/23 2315 -- -- -- 82 11 97 %   01/19/23 2310 131/79 -- -- -- -- --   01/19/23 2304 (!) 154/85 -- -- 82 12 97 %   01/19/23 2205 -- -- -- 73 11 97 %   01/19/23 2200 130/81 -- -- 72 15 95 %   01/19/23 2155 132/75 -- -- 69 15 99 %   01/19/23 2152 -- -- -- -- 20 --   01/19/23 2151 137/79 -- -- -- -- --   01/19/23 2150 -- -- -- 69 18 99 %   01/19/23 2144 -- -- -- 78 12 99 %   01/19/23 2140 (!) 149/84 -- -- -- -- --   01/19/23 2135 (!) 153/88 98.1  F (36.7  C) Oral 78 18 94 %   01/19/23 1954 (!) 151/76 98.1  F (36.7  C) Oral 84 18 97 %        Physical Exam  Constitutional: Alert, attentive, GCS 15  HENT:    Nose: Nose normal.    Mouth/Throat: Oropharynx is clear, mucous membranes are moist   Eyes: EOM are normal.   CV: regular rate and rhythm; no murmurs, rubs or gallups  Chest: Effort normal and breath sounds normal.   GI:  There is no tenderness. No distension. Normal bowel sounds  MSK: Swollen and deformed right elbow with restricted range of motion secondary to pain. Limited flexion and extension of fingers due to dependent swelling. Radial pulse intact and appropriate.   Neurological: Alert, attentive  Skin: Skin is warm and dry.      Emergency Department Course     Imaging:  CT Elbow Right w/o Contrast   Final Result   IMPRESSION:   1.  Comminuted fracture of the coronoid process. Questionable minimally displaced fracture of the capitellum. Elbow joint well aligned.         Radius/Ulna XR, PA & LAT, right   Final Result   IMPRESSION:  There is a fracture dislocation of the right elbow, incompletely imaged and evaluated. Correlation with earlier elbow radiographs is recommended.      No additional sites of fracture identified. The distal radioulnar joint appears intact. Minimal osteoarthritic changes are present.      XR Elbow Right 2 Views   Final Result   IMPRESSION: Limited osseous detail, due to overlying splint material.      Incomplete reduction of the right elbow; an obstructing fracture fragment cannot be excluded. Correlation with CT imaging is recommended.      There is a fracture of the posterior capitellum, with largest fragment located at the posterior joint space. Likely additional fracture of the coronoid process, incompletely evaluated. Possible impacted fracture of the lateral humeral epicondyle.    Attention on CT imaging follow-up.      Elbow joint effusion is present.      Elbow XR, G/E 3 views, right   Final Result   IMPRESSION: There is a posterior dislocation of the elbow joint with fracture of the coronoid process. The fracture is mildly comminuted and the fracture fragments appear likely loose within the joint. Recommend repeat films following reduction. The    humerus is otherwise negative. Elbow joint effusion. Soft tissue swelling about the elbow. The glenohumeral joint is negative for fracture or dislocation. There is hypertrophic change at the AC joint.      Humerus XR, G/E 2 views, right   Final Result   IMPRESSION: There is a posterior dislocation of the elbow joint with fracture of the coronoid process. The fracture is mildly comminuted and the fracture fragments appear likely loose within the joint. Recommend repeat films following reduction. The    humerus is otherwise negative. Elbow joint effusion. Soft tissue swelling about the elbow. The glenohumeral joint is negative for fracture or dislocation. There is hypertrophic change at the AC joint.         Report per radiology    Laboratory:  Labs Ordered and  Resulted from Time of ED Arrival to Time of ED Departure - No data to display     [facility]    -Fracture    Date/Time: 1/19/2023 9:09 PM  Performed by: Alayna Mejía MD  Authorized by: Alayna Mejía MD     Risks, benefits and alternatives discussed.    ED EVALUATION:      Assessment Time: 1/19/2023 9:10 PM      I have performed an Emergency Department Evaluation including taking a history and physical examination, this evaluation will be documented in the electronic medical record for this ED encounter.     Indication: fracture dislocation reduction right elbow    ASA Class: Class 2- mild systemic disease, no acute problems, no functional limitations    Mallampati: Grade 2- soft palate, base of uvula, tonsillar pillars, and portion of posterior pharyngeal wall visible    NPO Status: not NPO, emergent situation    UNIVERSAL PROTOCOL   Site Marked: NA  Prior Images Obtained and Reviewed:  Yes  Required items: Required blood products, implants, devices and special equipment available    Patient identity confirmed:  Verbally with patient and arm band  Patient was reevaluated immediately before administering moderate or deep sedation or anesthesia  Confirmation Checklist:  Patient's identity using two indicators  Time out: Immediately prior to the procedure a time out was called    Universal Protocol: the Joint Commission Universal Protocol was followed    Preparation: Patient was prepped and draped in usual sterile fashion (Equipment prepared at bedside, no sterile prep required)      INJURY      Injury location:  Elbow    Elbow injury location:  R elbow    Elbow fracture type comment:  Fracture dislocation    PRE PROCEDURE ASSESSMENT      Neurological function: normal      Distal perfusion: normal      Range of motion: reduced      SEDATION  Patient Sedated: Yes    Sedation Type:  Moderate (conscious) sedation  Sedation:  Propofol  Vital signs: Vital signs  monitored during sedation      ANESTHESIA (see MAR for exact dosages)      Anesthesia method:  None    PROCEDURE DETAILS:     Manipulation performed: yes      Skeletal traction used: yes      Reduction successful: yes      X-ray confirmed reduction: yes      Immobilization:  Splint    Splint type:  Long arm    Supplies used:  Ortho-Glass    POST PROCEDURE ASSESSMENT      Neurological function: normal      Distal perfusion: normal      Range of motion: improved        PROCEDURE  Describe Procedure: Traction and counter traction were applied to the right arm.  Elbow mainulpated until alignment improved and ROM improved.  Posterior long arm splint placed.  CMS intact after reduction.  Patient Tolerance:  Patient tolerated the procedure well with no immediate complications  Length of time physician/provider present for 1:1 monitoring during sedation: 20     Emergency Department Course & Assessments:       Interventions:  Medications   oxyCODONE (ROXICODONE) tablet 5 mg (5 mg Oral Not Given 1/19/23 2010)   HYDROmorphone (PF) (DILAUDID) injection 0.5 mg (0.5 mg Intravenous Given 1/19/23 2116)   propofol (DIPRIVAN) injection 10 mg/mL vial (80 mg Intravenous Given 1/19/23 2147)   propofol (DIPRIVAN) injection 10 mg/mL vial (20 mg Intravenous Given 1/19/23 2149)   HYDROmorphone (PF) (DILAUDID) injection 0.5 mg (0.5 mg Intravenous Given 1/19/23 2237)   propofol (DIPRIVAN) injection 10 mg/mL vial (100 mg Intravenous Given 1/19/23 2304)   propofol (DIPRIVAN) injection 10 mg/mL vial (100 mg Intravenous Given 1/19/23 2308)   HYDROmorphone (PF) (DILAUDID) injection 0.5 mg (0.5 mg Intravenous Given 1/20/23 0010)        Independent Interpretation (X-rays, CTs, rhythm strip):  2038 Elbow xray: posterior dislocation of right elbow and fracture fragments in joint space.  2229 Elbow xray: incomplete reduction of posterior right elbow dislocation.    Ulna and radius: no fracture identified      Consultations/Discussion of Management or  Tests:  2054 I obtained history and examined the patient as noted above.   2100 Case discussed with Dr Holt who is supervising physician       Social Determinants of Health affecting care:  None     Disposition:  The patient was discharged to home.     Impression & Plan      Medical Decision Making:  Patient is a pleasant 63 yo M who presents with right elbow pain after mechanical FOOSH on ice this afternoon. He is in no acute distress. Physical examination reveals right elbow deformity with significant swelling and reduced range of motion. Right hand also appears swollen. No numbness or tingling distal to injury per patient. Radial pulse present. Fracture and/or deformity suspected.  Xray shows posterior dislocation of right elbow and comminuted fracture of the coronoid process. Forearm xray show no acute fracture. CSM remains intact distal to injury. Results reviewed and patient staffed with Dr. Mejía who recommends procedural sedation with reduction. Please see Dr. Mejía's note for more information.  Patient sedated with propofol. Initial reduction was incomplete on post-reduction xrays. Procedural sedation repeated and secondary reduction performed with post-reduction CT. Patient tolerated procedures well.  CT shows complete reduction of right elbow joint and possible capitellum fracture. Results reviewed with Dr. Mejía who recommends outpatient follow-up with orthopedics and pain medication.  Results discussed with patient. His pain is manageable at this time. He is aware he will need orthopedic consultation and possible surgery. [facility] information given and phone referral was placed this evening. Patient otherwise had a negative trauma evaluation and is safe for discharge. Supportive cares including rest, elevation of affected extremity, and ibuprofen, and return precautions to ED discussed. Patient expressed understanding of plan and was ready for discharge.    Diagnosis:    ICD-10-CM    1. Closed  fracture dislocation of right elbow, initial encounter  S42.401A       2. Fall, initial encounter  W19.XXXA            Discharge Medications:  Discharge Medication List as of 1/20/2023  1:38 AM      START taking these medications    Details   oxyCODONE-acetaminophen (PERCOCET) 5-325 MG tablet Take 1 tablet by mouth every 6 hours as needed for pain, Disp-10 tablet, R-0, E-Prescribe              Scribe Disclosure:  I, Juliano Balderas, am serving as a scribe on 1/19/2023 at 9:05 PM to personally document services performed by Alayna Mejía MD based on my observations and the provider's statements to me.        Carla Cuevas PA-C  01/20/23 0142      MD Alfonzo Montes Kristi Jo Schneider, MD  01/20/23 1027

## 2023-01-20 NOTE — PROGRESS NOTES
An ETCO2 monitor was placed on the pt with 2LPM bled in. The Ambu bag, suction, and airways were setup and present in the room, but not needed. Pt was able to maintain airway throughout the procedure with no intervention needed. ETCO2 levels were maintained between 34-37.

## 2023-01-20 NOTE — ED NOTES
PIT/Triage Evaluation    Patient presented after a fall.  The patient states that he was snowblowing and fell around 1 PM.  He noted right mid humerus discomfort as well as right elbow discomfort.    The patient is on Brilinta due to cardiac issues.    The patient and his sister (here at bedside) states that the pain has been worsening throughout the day.  He initially was able to bend at the elbow and internally rotate his arm to keep it next to his abdomen though as the day has progressed the elbow has not more swollen and that is progressed in an inferior direction down to his hand.    The patient has been feeling the need to leave his arm in a somewhat extended state appointed in an inferior direction   And this limits his range of motion.    Exam is notable for:      HEENT:  mmm. Normal phonation.  Eyes: PERRL B/L  CV: Peripheral pulses in tact and regular  Resp: Speaking in full sentences without any respiratory distress  Musculoskeletal:  RUE    Point tenderness in the mid humerus without deformity.    There is notable soft tissue swelling about the right elbow.  There seems to be bony tenderness but this is hard to ascertain with a given degree of swelling.    No direct bony forearm tenderness, wrist tenderness nor hand tenderness    Range of motion is decreased from the elbow downward due to swelling.    Neuro: Alert, no gross motor or sensory deficits  Psych: Calm      Appropriate interventions for symptom management were initiated if applicable.  Appropriate diagnostic tests were initiated if indicated.    Important information for subsequent clinician:    I think a humerus fracture is unlikely but x-rays were ordered.  Elbow fracture is likely though his issues are complicated by Brilinta usage and swelling likely secondary to hematoma and the resultant decreased range of motion.    I briefly evaluated the patient and developed an initial plan of care. I discussed this plan and explained that this brief  interaction does not constitute a full evaluation. Patient/family understands that they should wait to be fully evaluated and discuss any test results with another clinician prior to leaving the hospital.     García Ridley DO  01/19/23 2010

## 2023-01-20 NOTE — ED TRIAGE NOTES
Patient slipped in the road today and landed on his right elbow, patient has a obvious deformity to his elbow, patient states it feels tight and is difficult to bend.      Triage Assessment     Row Name 01/19/23 1951       Triage Assessment (Adult)    Airway WDL WDL       Respiratory WDL    Respiratory WDL WDL       Skin Circulation/Temperature WDL    Skin Circulation/Temperature WDL WDL       Cardiac WDL    Cardiac WDL WDL       Peripheral/Neurovascular WDL    Peripheral Neurovascular WDL WDL       Cognitive/Neuro/Behavioral WDL    Cognitive/Neuro/Behavioral WDL WDL

## 2023-01-20 NOTE — PROGRESS NOTES
An ETCO2 monitor was placed on the pt [id number] bled in. The Ambu bag, suction, and airways were setup and present in the room, but not needed. Pt was able to maintain airway throughout the procedure with no intervention needed. ETCO2 levels were maintained between 35-37.

## 2023-01-20 NOTE — ED PROVIDER NOTES
Emergency Department Attending Supervision Note  1/20/2023  9:52 AM      I evaluated this patient in conjunction with Carla Cuevas PA-C      Briefly, the patient presented with elbow pain after fall.  Pain with ROM of elbow.  On antiplatelet.  Did not hit head.  Pain greatest at elbow.  No numbness, tingling or weakness in hand.  Injury happened shortly PTA.  Denies hitting head.  No injury to neck chest or abdomen.      Exam:    Gen: alert  HEENT: PERRL, oropharynx clear, no intraoral laceration or dental trauma, no mandibular tenderness, no trismus  Neck: Full AROM, no paraspinous tenderness, no midline tenderness  CV: RRR, no murmurs, 2+ pulses in all extremities  Chest wall: no crepitus, no tenderness  Pulm: breath sounds equal, lungs clear  Abd: Soft, no tenderness  Back: no thoracic midline tenderness, no lumbar midline tenderness, no paraspinous tenderness  RUE: no tenderness at shoulder or wrist, tenderness and swelling to right elbow    Skin: no laceration  Neuro: GCS 15, normal sensation in radial media and ulnar nerves in the hand, normal finger abduction, normal pinch with thumb and index finger, able to extend wrist    MDM and Plan:  Fracture dislocation of right elbow.  Two reduction attempts were required.  Second reduction was successful.  CT obtained to further eval fracture pattern.  CMS intact.  Follow up ortho.   left for TCO follow up.      Diagnosis    ICD-10-CM    1. Closed fracture dislocation of right elbow, initial encounter  S42.401A       2. Fall, initial encounter  W19.XXXA                Alayna Mejía MD  01/20/23 1027

## 2023-01-24 ENCOUNTER — ANCILLARY PROCEDURE (OUTPATIENT)
Dept: GENERAL RADIOLOGY | Facility: CLINIC | Age: 63
End: 2023-01-24
Attending: STUDENT IN AN ORGANIZED HEALTH CARE EDUCATION/TRAINING PROGRAM
Payer: COMMERCIAL

## 2023-01-24 ENCOUNTER — TELEPHONE (OUTPATIENT)
Dept: ORTHOPEDICS | Facility: CLINIC | Age: 63
End: 2023-01-24

## 2023-01-24 ENCOUNTER — OFFICE VISIT (OUTPATIENT)
Dept: ORTHOPEDICS | Facility: CLINIC | Age: 63
End: 2023-01-24
Payer: COMMERCIAL

## 2023-01-24 ENCOUNTER — HOSPITAL ENCOUNTER (OUTPATIENT)
Facility: AMBULATORY SURGERY CENTER | Age: 63
End: 2023-01-24
Attending: STUDENT IN AN ORGANIZED HEALTH CARE EDUCATION/TRAINING PROGRAM | Admitting: STUDENT IN AN ORGANIZED HEALTH CARE EDUCATION/TRAINING PROGRAM
Payer: COMMERCIAL

## 2023-01-24 VITALS — WEIGHT: 181 LBS | SYSTOLIC BLOOD PRESSURE: 125 MMHG | DIASTOLIC BLOOD PRESSURE: 74 MMHG | BODY MASS INDEX: 30.12 KG/M2

## 2023-01-24 DIAGNOSIS — S53.114A ANTERIOR DISLOCATION OF RIGHT ELBOW, INITIAL ENCOUNTER: ICD-10-CM

## 2023-01-24 DIAGNOSIS — M79.631 PAIN AND SWELLING OF FOREARM, RIGHT: Primary | ICD-10-CM

## 2023-01-24 DIAGNOSIS — M79.631 PAIN AND SWELLING OF FOREARM, RIGHT: ICD-10-CM

## 2023-01-24 DIAGNOSIS — M79.89 PAIN AND SWELLING OF FOREARM, RIGHT: ICD-10-CM

## 2023-01-24 DIAGNOSIS — M79.89 PAIN AND SWELLING OF FOREARM, RIGHT: Primary | ICD-10-CM

## 2023-01-24 DIAGNOSIS — S42.401A CLOSED FRACTURE DISLOCATION OF RIGHT ELBOW, INITIAL ENCOUNTER: ICD-10-CM

## 2023-01-24 PROCEDURE — 73080 X-RAY EXAM OF ELBOW: CPT | Mod: TC | Performed by: RADIOLOGY

## 2023-01-24 PROCEDURE — 29125 APPL SHORT ARM SPLINT STATIC: CPT | Mod: RT | Performed by: STUDENT IN AN ORGANIZED HEALTH CARE EDUCATION/TRAINING PROGRAM

## 2023-01-24 PROCEDURE — 99204 OFFICE O/P NEW MOD 45 MIN: CPT | Mod: 25 | Performed by: STUDENT IN AN ORGANIZED HEALTH CARE EDUCATION/TRAINING PROGRAM

## 2023-01-24 NOTE — PROGRESS NOTES
Orthopaedic Surgery Hand and Upper Extremity Clinic H&P NOTE:  Date: Jan 24, 2023    Patient Name: Dago Dangelo  MRN: [id number]    CHIEF COMPLAINT: Right elbow fx dislocation    Dominant Hand: Right  Occupation: Retired      HPI:  Mr. Dago Dangelo is a 62 year old male right hand dominant who presents with right elbow injury. The patient reports that on 1/19/23 he was using his snow-blower when he slipped on ice and fell sideways directly onto his right elbow. He was evaluated at ED on day of injury and was diagnosed with a fracture dislocation.. XR, reduction and CT were performed. Patient was placed in posterior long arm splint. Patient reports no problems in the splint other than some itchiness. Patient also reports a decrease in swell and can feel that the splint is a little loser due to less swelling     Symptom Onset:  Trauma/Inciting Event: patient reports that he was snow blowing and he slipped on ice  Location of pain/symptoms: patient reports that he has steady dull pain in the elbow midway through the forearm. Does explain that he gets pain with accidental external rotation   Duration (constant/Intermittent, etc): DOI 1/19/23  Characteristics of pain (sharp, dull, etc): patient explains he has dull pain unless rotating it turns to more of sharp pain  Aggravating factors: rotating arm     Patient smokes about a pack a day.  He is also diabetic, last hemoglobin A1c 6.7 12/1/2022    PAST MEDICAL HISTORY:  Past Medical History:   Diagnosis Date     Arthritis 6-1-84     CAD (coronary artery disease) 12-     multivessel diffuse disease, Promus KIARRA x2, OM 1, CFX     Diabetes mellitus (H)      HTN (hypertension)      Hyperlipidemia      NSTEMI (non-ST elevated myocardial infarction) (H)      Tobacco dependence        PAST SURGICAL HISTORY:  Past Surgical History:   Procedure Laterality Date     COLONOSCOPY       STENT, CORONARY, JENNIFER  2015    KIARRA=OM1, Mid L Cx       MEDICATIONS:  Current Outpatient  Medications   Medication     ASPIRIN LOW DOSE 81 MG EC tablet     atorvastatin (LIPITOR) 40 MG tablet     BRILINTA 90 MG tablet     carvedilol (COREG) 25 MG tablet     chlorthalidone (HYGROTON) 25 MG tablet     FARXIGA 10 MG TABS tablet     gemfibrozil (LOPID) 600 MG tablet     ibuprofen (ADVIL/MOTRIN) 600 MG tablet     losartan (COZAAR) 100 MG tablet     metFORMIN (GLUCOPHAGE) 500 MG tablet     multivitamin, therapeutic with minerals (THERA-VIT-M) TABS     nitroGLYcerin (NITROSTAT) 0.4 MG sublingual tablet     No current facility-administered medications for this visit.       ALLERGIES:   No Known Allergies    FAMILY HISTORY:  No pertinent family history    SOCIAL HISTORY:  Social History     Tobacco Use     Smoking status: Every Day     Packs/day: 1.00     Years: 30.00     Pack years: 30.00     Types: Cigarettes     Smokeless tobacco: Never     Tobacco comments:     may have one once weekly   Vaping Use     Vaping Use: Never used   Substance Use Topics     Alcohol use: Yes     Comment: rare, 1 monthly     Drug use: No       The patient's past medical, family, and social history was reviewed and confirmed.    REVIEW OF SYMPTOMS:      General: Negative   Eyes: Negative   Ear, Nose and Throat: Negative   Respiratory: Negative   Cardiovascular: Negative   Gastrointestinal: Negative   Genito-urinary: Negative   Musculoskeletal: Negative  Neurological: Negative   Psychological: Negative  HEME: Negative   ENDO: Negative   SKIN: Negative    VITALS:  There were no vitals filed for this visit.    EXAM:  General: NAD, A&Ox3  HEENT: NC/AT  CV: RRR by peripheral pulse  Pulmonary: Non-labored breathing on RA  RUE:  Diffuse swelling about right elbow  Skin intact  Bruising laterally and ulnarly  Stable arc of motion , near full supination pronation  +TTP lateral epicondyle and radial head  +TTP medial epicondyle and joint line  Push off and pivot shift unable to be performed due to discomfort  5/5 EPL/FPL/HI  SILT  m/r/u  WWP CR< 2s    LABS:  hemoglobin A1c 6.7 12/1/2022    IMAGING:    X-rays and CT scan of the right elbow demonstrate a posterior dislocation of the elbow, with coronoid tip fracture.    I have personally reviewed the above images and labs.         IMPRESSION AND RECOMMENDATIONS:  Mr. Dago Dangelo is a 62 year old male right hand dominant with right elbow fracture dislocation.    I discussed the diagnosis, images, and treatment options with the patient.    Elbow is currently reduced, however given the size of the coronoid tip fragment and likely injury to the lateral and ulnar collateral ligaments, I do think it is most prudent to perform an exam under anesthesia to assess the elbow stability.    There is any instability encountered, he would be indicated for a possible lateral ulnar collateral ligament repair and coronoid process repair.    The indications for surgery were discussed with the patient. The benefits, risks, and alternatives of operative management were discussed in detail with the patient. The patient understands that the risks of surgery include, but are not limited to: infection, bleeding, injury to nearby structures (such as nerves, blood vessels, and tendons), nonunion, malunion, hardware failure/irritation or breakage, persistent instability, need for additional surgery, pain, stiffness, scarring, need for rehabilitation, and anesthetic complications.  Patient expressed understanding and elected to proceed with surgery. All questions were answered to the patient's satisfaction.    Urgent MRI ordered to evaluate soft tissue structures.      Cast/splint application    Date/Time: 1/24/2023 12:13 PM  Performed by: Arsen Ordaz ATC  Authorized by: Daniel Osman MD     Consent:     Consent obtained:  Verbal    Consent given by:  Patient    Risks discussed:  Discoloration, numbness, pain and swelling  Pre-procedure details:     Sensation:  Normal  Procedure details:     Laterality:  Right     Location:  Elbow    Elbow:  R elbow    Splint type:  Posterior slab    Supplies:  Fiberglass  Post-procedure details:     Pain:  Unchanged    Pain level:  0/10    Sensation:  Normal    Patient tolerance of procedure:  Tolerated well, no immediate complications    Patient provided with cast or splint care instructions: No          Daniel Osman MD    Hand, Upper Extremity & Microvascular Surgery  Department of Orthopaedic Surgery  [facility]

## 2023-01-24 NOTE — TELEPHONE ENCOUNTER
Phoned patient to confirm surgery date of 2/3/23 with Dr. Osman.     Asked patient to please give us a call at his earliest convenience to go over details, surgery time, arrival time, pre-op, etc.     Provided call back number of [phone].

## 2023-01-24 NOTE — LETTER
1/24/2023         RE: Dago Dangelo  [address]  [place] [address]        Dear Colleague,    Thank you for referring your patient, Dago Dangelo, to the [facility]. Please see a copy of my visit note below.    Orthopaedic Surgery Hand and Upper Extremity Clinic H&P NOTE:  Date: Jan 24, 2023    Patient Name: Dago Dangelo  MRN: [id number]    CHIEF COMPLAINT: Right elbow fx dislocation    Dominant Hand: Right  Occupation: Retired      HPI:  Mr. Dago Dangelo is a 62 year old male right hand dominant who presents with right elbow injury. The patient reports that on 1/19/23 he was using his snow-blower when he slipped on ice and fell sideways directly onto his right elbow. He was evaluated at ED on day of injury and was diagnosed with a fracture dislocation.. XR, reduction and CT were performed. Patient was placed in posterior long arm splint. Patient reports no problems in the splint other than some itchiness. Patient also reports a decrease in swell and can feel that the splint is a little loser due to less swelling     Symptom Onset:  Trauma/Inciting Event: patient reports that he was snow blowing and he slipped on ice  Location of pain/symptoms: patient reports that he has steady dull pain in the elbow midway through the forearm. Does explain that he gets pain with accidental external rotation   Duration (constant/Intermittent, etc): DOI 1/19/23  Characteristics of pain (sharp, dull, etc): patient explains he has dull pain unless rotating it turns to more of sharp pain  Aggravating factors: rotating arm     Patient smokes about a pack a day.  He is also diabetic, last hemoglobin A1c 6.7 12/1/2022    PAST MEDICAL HISTORY:  Past Medical History:   Diagnosis Date     Arthritis 6-1-84     CAD (coronary artery disease) 12-     multivessel diffuse disease, Promus KIARRA x2, OM 1, CFX     Diabetes mellitus (H)      HTN (hypertension)       Hyperlipidemia      NSTEMI (non-ST elevated myocardial infarction) (H)      Tobacco dependence        PAST SURGICAL HISTORY:  Past Surgical History:   Procedure Laterality Date     COLONOSCOPY       STENT, CORONARY, JENNIFER  2015    KIARRA=OM1, Mid L Cx       MEDICATIONS:  Current Outpatient Medications   Medication     ASPIRIN LOW DOSE 81 MG EC tablet     atorvastatin (LIPITOR) 40 MG tablet     BRILINTA 90 MG tablet     carvedilol (COREG) 25 MG tablet     chlorthalidone (HYGROTON) 25 MG tablet     FARXIGA 10 MG TABS tablet     gemfibrozil (LOPID) 600 MG tablet     ibuprofen (ADVIL/MOTRIN) 600 MG tablet     losartan (COZAAR) 100 MG tablet     metFORMIN (GLUCOPHAGE) 500 MG tablet     multivitamin, therapeutic with minerals (THERA-VIT-M) TABS     nitroGLYcerin (NITROSTAT) 0.4 MG sublingual tablet     No current facility-administered medications for this visit.       ALLERGIES:   No Known Allergies    FAMILY HISTORY:  No pertinent family history    SOCIAL HISTORY:  Social History     Tobacco Use     Smoking status: Every Day     Packs/day: 1.00     Years: 30.00     Pack years: 30.00     Types: Cigarettes     Smokeless tobacco: Never     Tobacco comments:     may have one once weekly   Vaping Use     Vaping Use: Never used   Substance Use Topics     Alcohol use: Yes     Comment: rare, 1 monthly     Drug use: No       The patient's past medical, family, and social history was reviewed and confirmed.    REVIEW OF SYMPTOMS:      General: Negative   Eyes: Negative   Ear, Nose and Throat: Negative   Respiratory: Negative   Cardiovascular: Negative   Gastrointestinal: Negative   Genito-urinary: Negative   Musculoskeletal: Negative  Neurological: Negative   Psychological: Negative  HEME: Negative   ENDO: Negative   SKIN: Negative    VITALS:  There were no vitals filed for this visit.    EXAM:  General: NAD, A&Ox3  HEENT: NC/AT  CV: RRR by peripheral pulse  Pulmonary: Non-labored breathing on RA  RUE:  Diffuse swelling about right  elbow  Skin intact  Bruising laterally and ulnarly  Stable arc of motion , near full supination pronation  +TTP lateral epicondyle and radial head  +TTP medial epicondyle and joint line  Push off and pivot shift unable to be performed due to discomfort  5/5 EPL/FPL/HI  SILT m/r/u  WWP CR< 2s    LABS:  hemoglobin A1c 6.7 12/1/2022    IMAGING:    X-rays and CT scan of the right elbow demonstrate a posterior dislocation of the elbow, with coronoid tip fracture.    I have personally reviewed the above images and labs.         IMPRESSION AND RECOMMENDATIONS:  Mr. Dago Dangelo is a 62 year old male right hand dominant with right elbow fracture dislocation.    I discussed the diagnosis, images, and treatment options with the patient.    Elbow is currently reduced, however given the size of the coronoid tip fragment and likely injury to the lateral and ulnar collateral ligaments, I do think it is most prudent to perform an exam under anesthesia to assess the elbow stability.    There is any instability encountered, he would be indicated for a possible lateral ulnar collateral ligament repair and coronoid process repair.    The indications for surgery were discussed with the patient. The benefits, risks, and alternatives of operative management were discussed in detail with the patient. The patient understands that the risks of surgery include, but are not limited to: infection, bleeding, injury to nearby structures (such as nerves, blood vessels, and tendons), nonunion, malunion, hardware failure/irritation or breakage, persistent instability, need for additional surgery, pain, stiffness, scarring, need for rehabilitation, and anesthetic complications.  Patient expressed understanding and elected to proceed with surgery. All questions were answered to the patient's satisfaction.    Urgent MRI ordered to evaluate soft tissue structures.      Cast/splint application    Date/Time: 1/24/2023 12:13 PM  Performed by:  Arsen Ordaz, [facility]  Authorized by: Daniel Osman MD     Consent:     Consent obtained:  Verbal    Consent given by:  Patient    Risks discussed:  Discoloration, numbness, pain and swelling  Pre-procedure details:     Sensation:  Normal  Procedure details:     Laterality:  Right    Location:  Elbow    Elbow:  R elbow    Splint type:  Posterior slab    Supplies:  Fiberglass  Post-procedure details:     Pain:  Unchanged    Pain level:  0/10    Sensation:  Normal    Patient tolerance of procedure:  Tolerated well, no immediate complications    Patient provided with cast or splint care instructions: No          Daniel Osman MD    Hand, Upper Extremity & Microvascular Surgery  Department of Orthopaedic Surgery  [facility]          Again, thank you for allowing me to participate in the care of your patient.        Sincerely,        Daniel Osman MD

## 2023-01-24 NOTE — TELEPHONE ENCOUNTER
Patient is scheduled for surgery with Dr. Osman    Spoke with: Jono    Date of Surgery: 2/3/23    Location: [facility]    Informed patient they will need an adult  Yes    Pre op with Provider PAC    Pre-procedure COVID-19 Test: N/A    Additional imaging/appointments: N/A    Surgery packet: Received     Additional comments: N/A

## 2023-01-25 ENCOUNTER — PRE VISIT (OUTPATIENT)
Dept: SURGERY | Facility: CLINIC | Age: 63
End: 2023-01-25

## 2023-01-25 ENCOUNTER — ANESTHESIA EVENT (OUTPATIENT)
Dept: SURGERY | Facility: CLINIC | Age: 63
End: 2023-01-25
Payer: COMMERCIAL

## 2023-01-25 ENCOUNTER — VIRTUAL VISIT (OUTPATIENT)
Dept: SURGERY | Facility: CLINIC | Age: 63
End: 2023-01-25
Payer: COMMERCIAL

## 2023-01-25 DIAGNOSIS — Z01.818 PRE-OP EVALUATION: Primary | ICD-10-CM

## 2023-01-25 PROCEDURE — 99203 OFFICE O/P NEW LOW 30 MIN: CPT | Mod: 95 | Performed by: PHYSICIAN ASSISTANT

## 2023-01-25 RX ORDER — OXYCODONE AND ACETAMINOPHEN TABLETS 5; 300 MG/1; MG/1
TABLET ORAL PRN
COMMUNITY
End: 2023-09-07

## 2023-01-25 ASSESSMENT — LIFESTYLE VARIABLES: TOBACCO_USE: 1

## 2023-01-25 ASSESSMENT — ENCOUNTER SYMPTOMS: SEIZURES: 0

## 2023-01-25 NOTE — PROGRESS NOTES
Jono is a 62 year old who is being evaluated via a billable video visit.      How would you like to obtain your AVS? MyChart          Subjective   Jono is a 62 year old, presenting for the following health issues:  Pre-Op Exam (/)      HPI         Review of Systems       Physical Exam         ALLYSON Marinelli LPN

## 2023-01-25 NOTE — TELEPHONE ENCOUNTER
FUTURE VISIT INFORMATION      SURGERY INFORMATION:    Date: 2/3/23    [facility]    Surgeon:  Daniel Osman MD    Anesthesia Type:  General    Procedure: EXAM UNDER ANESTHESIA RIGHT ELBOW REPAIR LATERAL ULNAR COLLATERAL LIGAMENT AND CORONOID PROCESS    Consult: ov 23    RECORDS REQUESTED FROM:       Primary Care Provider: Tl Ortiz MD    Pertinent Medical History: hypertension, cad    Most recent EKG+ Tracin18    Most recent Coronary Angiogram: 12/18/15

## 2023-01-25 NOTE — H&P
Pre-Operative H & P     CC:  Preoperative exam to assess for increased cardiopulmonary risk while undergoing surgery and anesthesia.    Date of Encounter: 1/25/2023  Primary Care Physician:  Tl Ortiz     Reason for visit:   Encounter Diagnosis   Name Primary?     Pre-op evaluation Yes       HPI  Dago Dangelo is a 62 year old male who presents for pre-operative H & P in preparation for  Procedure Information     Case: [id number] Date/Time: 02/03/23 1305    Procedures:       EXAM UNDER ANESTHESIA RIGHT ELBOW (Right: Update)      REPAIR LATERAL ULNAR COLLATERAL LIGAMENT AND CORONOID PROCESS (Right: Wrist)    Anesthesia type: General    Diagnosis: Closed fracture dislocation of right elbow, initial encounter [S42.401A]    Pre-op diagnosis: Closed fracture dislocation of right elbow, initial encounter [S42.401A]    Location: [place] [address] / [facility]    Providers: Daniel Osman MD          Patient is being evaluated for comorbid conditions of hypertension, CAD s/p KIARRA x2 in 2015, tobacco use, diabetes, arthritis    Mr. Dangelo has a history of a right elbow injury after falling on the ice 1/19/23 while snow blowing. He was seen in the ED and was diagnosed with a fracture dislocation that was reduced in the ED. He was seen by Dr. Osman for further evaluation and is now scheduled for the above procedure.     History is obtained from the patient and chart review    Hx of abnormal bleeding or anti-platelet use: ASA, brilinta      Past Medical History  Past Medical History:   Diagnosis Date     Arthritis 6-1-84     CAD (coronary artery disease) 12-     multivessel diffuse disease, Promus KIARRA x2, OM 1, CFX     Diabetes mellitus (H)      HTN (hypertension)      Hyperlipidemia      NSTEMI (non-ST elevated myocardial infarction) (H)      Tobacco dependence        Past Surgical History  Past Surgical History:   Procedure Laterality Date     COLONOSCOPY       STENT,  CORONARY, JENNIFER  2015    KIARRA=OM1, Mid L Cx       Prior to Admission Medications  Current Outpatient Medications   Medication Sig Dispense Refill     ASPIRIN LOW DOSE 81 MG EC tablet TAKE 1 TABLET BY MOUTH EVERY DAY (Patient taking differently: Take 81 mg by mouth every evening) 90 tablet 0     atorvastatin (LIPITOR) 40 MG tablet Take 1 tablet (40 mg) by mouth daily (Patient taking differently: Take 40 mg by mouth every evening) 90 tablet 3     BRILINTA 90 MG tablet TAKE 1 TABLET BY MOUTH TWICE A DAY (Patient taking differently: Take 90 mg by mouth 2 times daily) 180 tablet 1     carvedilol (COREG) 25 MG tablet Take 1 tablet (25 mg) by mouth 2 times daily (with meals) 180 tablet 0     chlorthalidone (HYGROTON) 25 MG tablet Take 1 tablet (25 mg) by mouth daily (Patient taking differently: Take 25 mg by mouth every morning) 90 tablet 0     FARXIGA 10 MG TABS tablet TAKE 1 TABLET (10 MG) BY MOUTH DAILY. (Patient taking differently: Take 10 mg by mouth every morning) 90 tablet 1     gemfibrozil (LOPID) 600 MG tablet Take 1 tablet (600 mg) by mouth 2 times daily 180 tablet 3     ibuprofen (ADVIL/MOTRIN) 600 MG tablet Take 1 tablet (600 mg) by mouth every 6 hours as needed for moderate pain 20 tablet 0     losartan (COZAAR) 100 MG tablet Take 1 tablet (100 mg) by mouth daily (Patient taking differently: Take 100 mg by mouth every evening) 90 tablet 0     metFORMIN (GLUCOPHAGE) 500 MG tablet Take 2 tablets (1,000 mg) by mouth 2 times daily (with meals) 360 tablet 0     multivitamin, therapeutic with minerals (THERA-VIT-M) TABS Take 1 tablet by mouth every evening       nitroGLYcerin (NITROSTAT) 0.4 MG sublingual tablet Place 1 tablet (0.4 mg) under the tongue every 5 minutes as needed for chest pain if you are still having symptoms after 3 doses (15 minutes) call 911. 25 tablet 1     oxyCODONE-Acetaminophen 5-300 MG TABS Take by mouth as needed         Allergies  No Known Allergies    Social History  Social History      Socioeconomic History     Marital status:      Spouse name: Not on file     Number of children: Not on file     Years of education: Not on file     Highest education level: Not on file   Occupational History     Not on file   Tobacco Use     Smoking status: Every Day     Packs/day: 1.00     Years: 30.00     Pack years: 30.00     Types: Cigarettes     Smokeless tobacco: Never     Tobacco comments:     may have one once weekly   Vaping Use     Vaping Use: Never used   Substance and Sexual Activity     Alcohol use: Not Currently     Alcohol/week: 6.0 standard drinks     Types: 6 Cans of beer per week     Drug use: No     Sexual activity: Yes     Partners: Female     Birth control/protection: Condom   Other Topics Concern      Service Not Asked     Blood Transfusions Not Asked     Caffeine Concern No     Comment: decaf ,  occas. caff.     Occupational Exposure Not Asked     Hobby Hazards Not Asked     Sleep Concern No     Stress Concern No     Weight Concern Not Asked     Special Diet No     Comment: eating better     Back Care Not Asked     Exercise No     Comment: some walking     Bike Helmet Not Asked     Seat Belt Not Asked     Self-Exams Not Asked     Parent/sibling w/ CABG, MI or angioplasty before 65F 55M? Yes     Comment: Younger brother at age 53. 10-20-15   Social History Narrative     Not on file     Social Determinants of Health     Financial Resource Strain: Not on file   Food Insecurity: Not on file   Transportation Needs: Not on file   Physical Activity: Not on file   Stress: Not on file   Social Connections: Not on file   Intimate Partner Violence: Not on file   Housing Stability: Not on file       Family History  Family History   Problem Relation Age of Onset     Glaucoma Mother      Hypertension Mother      Hyperlipidemia Mother      Hypertension Father      Hyperlipidemia Father      Cerebrovascular Disease Father      Glaucoma Sister      Diabetes Sister      Hypertension Sister       Hyperlipidemia Sister      Colon Cancer Sister      Other Cancer Sister      Diabetes Sister      Hypertension Sister      Diabetes Sister         #3     Hyperlipidemia Sister      Hypertension Sister         #2     Other Cancer Sister      Hypertension Sister      Diabetes Sister      Diabetes Sister      Coronary Artery Disease Brother      Cerebrovascular Disease Brother      Cerebrovascular Disease Brother 52        (older)      Coronary Artery Disease Brother      Cerebrovascular Disease Brother      Macular Degeneration No family hx of      Anesthesia Reaction No family hx of      Deep Vein Thrombosis (DVT) No family hx of        Review of Systems  The complete review of systems is negative other than noted in the HPI or here.   Anesthesia Evaluation   Pt has had prior anesthetic.     No history of anesthetic complications       ROS/MED HX  ENT/Pulmonary:     (+) ALEX risk factors, hypertension, tobacco use, Current use, 1 packs/day,     Neurologic:  - neg neurologic ROS  (-) no seizures and no CVA   Cardiovascular:     (+) hypertension--CAD --stent-. 2 Drug Eluting Stent. Taking blood thinners Previous cardiac testing   Echo: Date: Results:    Stress Test: Date:  Results:  Impression  1. Myocardial perfusion imaging using single isotope technique  demonstrated normal myocardial perfusion.   2. Gated images demonstrated normal wall motion. The left ventricular  systolic function is normal with a calculated ejection fraction of  64%.  ECG Reviewed: Date:  Results:  NSR  Cath:  Date: Results:      METS/Exercise Tolerance: 3 - Able to walk 1-2 blocks without stopping Comment: Snow blowing short drive way, can ascend a flight of stairs without KING or CP   Hematologic:  - neg hematologic  ROS  (-) history of blood clots and history of blood transfusion   Musculoskeletal: Comment: Right elbow closed fracture      GI/Hepatic:  - neg GI/hepatic ROS  (-) GERD   Renal/Genitourinary:     (+) renal  disease, type: CRI, Pt does not require dialysis,     Endo:     (+) type II DM, Last HgA1c: 6.7, date: 12/1/22, Not using insulin,  (-) chronic steroid usage   Psychiatric/Substance Use:  - neg psychiatric ROS     Infectious Disease:  - neg infectious disease ROS     Malignancy:  - neg malignancy ROS     Other:            Virtual visit -  No vitals were obtained    Physical Exam  Constitutional: Awake, alert, cooperative, no apparent distress, and appears stated age.  HENT: Normocephalic  Respiratory: non labored breathing   Neurologic: Awake, alert, oriented to name, place and time.   Neuropsychiatric: Calm, cooperative. Normal affect.      Prior Labs/Diagnostic Studies   All labs and imaging personally reviewed     EKG/ stress test - if available please see in ROS above   No results found.  No flowsheet data found.      The patient's records and results personally reviewed by this provider.     Outside records reviewed from: Care Everywhere      Assessment      Dago Dangelo is a 62 year old male seen as a PAC referral for risk assessment and optimization for anesthesia.    Plan/Recommendations  Pt will be optimized for the proposed procedure.  See below for details on the assessment, risk, and preoperative recommendations    NEUROLOGY  - No history of TIA, CVA or seizure  -Post Op delirium risk factors:  No risk identified    ENT  - No current airway concerns.  Will need to be reassessed day of surgery.  Mallampati: Unable to assess  TM: Unable to assess    CARDIAC  - No history of Afib   -hypertension using cozaar, hygroton and coreg  -CAD s/p KIARRA x2in 2015. Using ASA and Brilinta- will hold.  He is also on medical management with statin and coreg. Reports he has not needed to use nitroglycerin for many years. He completed stress test in 2018 with normal perfusion, EF 64%. Reports he can walk a few blocks and ascend a flight of stairs without CP or KING- reports limiting factor is foot pain.   - METS  "(Metabolic Equivalents)  Patient CANNOT perform 4 METS exercise without symptoms            Total Score: 1    Functional Capacity: Unable to complete 4 METS      RCRI-Low risk: Class 2 0.9% complication rate            Total Score: 1    RCRI: CAD        PULMONARY  ALEX Medium Risk            Total Score: 3    ALEX: Hypertension    ALEX: Over 50 ys old    ALEX: Male      - Denies asthma or inhaler use  - Tobacco History      History   Smoking Status     Every Day     Packs/day: 1.00     Years: 30.00     Types: Cigarettes   Smokeless Tobacco     Never       GI  PONV Low Risk  Total Score: 1           1 AN PONV: Intended Post Op Opioids        /RENAL  - Baseline Creatinine  WNL    ENDOCRINE    - BMI: Estimated body mass index is 30.12 kg/m  as calculated from the following:    Height as of 12/1/22: 1.651 m (5' 5\").    Weight as of 1/24/23: 82.1 kg (181 lb).  Obesity (BMI >30)  - Diabetes  Hemoglobin A1C (%)   Date Value   12/01/2022 6.7 (H)   07/08/2021 8.3 (H)     Diabetes Mellitus, Type 2, non-insulin dependent.  Hold morning oral hypoglycemic medications. Recommend close monitoring of the patient's blood glucose levels throughout the perioperative period.    HEME  VTE Low Risk 0.5%            Total Score: 3    VTE: Greater than 59 yrs old    VTE: Male      - Platelet disfunction second to Aspirin (Mahi, many others) and Ticegralor (Brillinta)    MSK  -right elbow injury with above procedure planned     Different anesthesia methods/types have been discussed with the patient, but they are aware that the final plan will be decided by the assigned anesthesia provider on the date of service.    The patient is optimized for their procedure. AVS with information on surgery time/arrival time, meds and NPO status given by nursing staff. No further diagnostic testing indicated.    Please refer to the physical examination documented by the anesthesiologist in the anesthesia record on the day of surgery.    Video-Visit " Details    Type of service:  Video Visit    Provider received verbal consent for a Video Visit from the patient? Yes   Video Start Time: 0840  Video End Time:0901    Originating Location (pt. Location): Home    Distant Location (provider location):  Off-site  Mode of Communication:  Video Conference via [employer]  On the day of service:     Prep time: 11 minutes  Visit time: 21 minutes  Documentation time: 8 minutes  ------------------------------------------  Total time: 40 minutes      Lesley James PA-C  Preoperative Assessment Center  [facility]  Clinic and Surgery Center  Phone: [phone]  Fax: [phone]

## 2023-01-25 NOTE — PATIENT INSTRUCTIONS
Preparing for Your Surgery      Name:  Dago Dangelo   MRN:  [id number]   :  1960   Today's Date:  2023       Arriving for surgery:  Surgery date:  2/3/23  Arrival time:  11:30 AM     Surgeries and procedures: Adult patients can have 2 visitors all through the surgery process.     Visiting hours: 8 a.m. to 8:30 p.m.     Hospital: Adult patients and children under age 18 can have 4 visitor at a time     No visitors under the age of 5 are allowed for hospital patients.  Double occupancy rooms: Patients can have only two visitors at a time.     Patients with disabilities: Can have a support person with them (family member, service provider     Or someone well informed about their needs) plus the allowed number of visitors     Patients confirmed or suspected to have symptoms of COVID 19 or flu:     No visitors allowed for adult patients.   Children (under age 18) can have 1 named visitor.     People who are sick or showing symptoms of COVID 19 or flu:    Are not allowed to visit patients--we can only make exceptions in special situations.       Please follow these guidelines for your visit:   Arrive wearing a mask over your mouth and nose; we will give you a medical mask to wear    If you arrive wearing a cloth mask.   Keep it on during your entire visit, even when in patient's room.   If you don't wear a mask we'll ask you to leave.     Clean your hands with alcohol hand . Do this when you arrive at and leave the building and patient room,    And again after you touch your mask or anything in the room.     You can t visit if you have a fever, cough, shortness of breath, muscle aches, headaches, sore throat    Or diarrhea      Stay 6 feet away from others during your visit and between visits     Go directly to and from the room you are visiting.     Stay in the patient s room during your visit. Limit going to other places in the hospital as much as possible     Leave bags and jackets at home or  in the car.     For everyone s health, please don t come and go during your visit. That includes for smoking   during your visit.     Please come to:     [facility] - [address] [address]     Parking is available in front of the Clinics and Surgery Center building from 5:30AM to 8:00PM.  -  Proceed to the 5th floor to check into the Ambulatory Surgery Center.              >> There will be patient concierges on the 1st and 5th floor, for assistance or an escort, if you would like.              >> Please call [phone] with any questions.    What can I eat or drink?  -  You may eat and drink normally up to 8 hours prior to arrival time.  -  You may have clear liquids until 2 hours prior to arrival time.     Examples of clear liquids:  Water  Clear broth  Juices (apple, white grape, white cranberry  and cider) without pulp  Noncarbonated, powder based beverages  (lemonade and Vikas-Aid)  Sodas (Sprite, 7-Up, ginger ale and seltzer)  Coffee or tea (without milk or cream)  Gatorade    -  No Alcohol for at least 24 hours before surgery.     Which medicines can I take?  Hold Aspirin for 7 days before surgery.   Hold Multivitamins for 7 days before surgery.  Hold Supplements for 7 days before surgery.  Hold Ibuprofen (Advil, Motrin) for 1 day before surgery--unless otherwise directed by surgeon.  Hold Naproxen (Aleve) for 4 days before surgery.  HOLD BRILINTA X 5 DAYS BEFORE SURGERY.    HOLD FARXIGA X 3 DAYS BEFORE SURGERY.    -  DO NOT take these medications the day of surgery:  Hygroton + metformin.    -  PLEASE TAKE these medications the day of surgery:  Oxycodone or tylenol if needed; take other morning medications.    How do I prepare myself?  - Please take 2 showers before surgery using Scrubcare or Hibiclens soap.    Use this soap only from the neck to your toes.     Leave the soap on your skin for one minute--then rinse thoroughly.       You may use your own shampoo and conditioner. No other hair products.   - Please remove all jewelry and body piercings.  - No lotions, deodorants or fragrance.  - No makeup or fingernail polish.   - Bring your ID and insurance card.    -If you have a Deep Brain Stimulator, Spinal Cord Stimulator, or any Neuro Stimulator device---you must bring the remote control to the hospital.      ALL PATIENTS GOING HOME THE SAME DAY OF SURGERY ARE REQUIRED TO HAVE A RESPONSIBLE ADULT TO DRIVE AND BE IN ATTENDANCE WITH THEM FOR 24 HOURS FOLLOWING SURGERY.    Covid testing policy as of 12/06/2022  Your surgeon will notify and schedule you for a COVID test if one is needed before surgery--please direct any questions or COVID symptoms to your surgeon      Questions or Concerns:    - For any questions regarding the day of surgery or your hospital stay, please contact the Pre Admission Nursing Office at [phone].       - If you have health changes between today and your surgery, please call your surgeon.       - For questions after surgery, please call your surgeons office.

## 2023-01-25 NOTE — H&P (VIEW-ONLY)
Pre-Operative H & P     CC:  Preoperative exam to assess for increased cardiopulmonary risk while undergoing surgery and anesthesia.    Date of Encounter: 1/25/2023  Primary Care Physician:  Tl Ortiz     Reason for visit:   Encounter Diagnosis   Name Primary?     Pre-op evaluation Yes       HPI  Dago Dangelo is a 62 year old male who presents for pre-operative H & P in preparation for  Procedure Information     Case: [id number] Date/Time: 02/03/23 1305    Procedures:       EXAM UNDER ANESTHESIA RIGHT ELBOW (Right: Update)      REPAIR LATERAL ULNAR COLLATERAL LIGAMENT AND CORONOID PROCESS (Right: Wrist)    Anesthesia type: General    Diagnosis: Closed fracture dislocation of right elbow, initial encounter [S42.401A]    Pre-op diagnosis: Closed fracture dislocation of right elbow, initial encounter [S42.401A]    Location: [place] [address] / [facility]    Providers: Daniel Osman MD          Patient is being evaluated for comorbid conditions of hypertension, CAD s/p KIARRA x2 in 2015, tobacco use, diabetes, arthritis    Mr. Dangelo has a history of a right elbow injury after falling on the ice 1/19/23 while snow blowing. He was seen in the ED and was diagnosed with a fracture dislocation that was reduced in the ED. He was seen by Dr. Osman for further evaluation and is now scheduled for the above procedure.     History is obtained from the patient and chart review    Hx of abnormal bleeding or anti-platelet use: ASA, brilinta      Past Medical History  Past Medical History:   Diagnosis Date     Arthritis 6-1-84     CAD (coronary artery disease) 12-     multivessel diffuse disease, Promus KIARRA x2, OM 1, CFX     Diabetes mellitus (H)      HTN (hypertension)      Hyperlipidemia      NSTEMI (non-ST elevated myocardial infarction) (H)      Tobacco dependence        Past Surgical History  Past Surgical History:   Procedure Laterality Date     COLONOSCOPY       STENT,  CORONARY, JENNIFER  2015    KIARRA=OM1, Mid L Cx       Prior to Admission Medications  Current Outpatient Medications   Medication Sig Dispense Refill     ASPIRIN LOW DOSE 81 MG EC tablet TAKE 1 TABLET BY MOUTH EVERY DAY (Patient taking differently: Take 81 mg by mouth every evening) 90 tablet 0     atorvastatin (LIPITOR) 40 MG tablet Take 1 tablet (40 mg) by mouth daily (Patient taking differently: Take 40 mg by mouth every evening) 90 tablet 3     BRILINTA 90 MG tablet TAKE 1 TABLET BY MOUTH TWICE A DAY (Patient taking differently: Take 90 mg by mouth 2 times daily) 180 tablet 1     carvedilol (COREG) 25 MG tablet Take 1 tablet (25 mg) by mouth 2 times daily (with meals) 180 tablet 0     chlorthalidone (HYGROTON) 25 MG tablet Take 1 tablet (25 mg) by mouth daily (Patient taking differently: Take 25 mg by mouth every morning) 90 tablet 0     FARXIGA 10 MG TABS tablet TAKE 1 TABLET (10 MG) BY MOUTH DAILY. (Patient taking differently: Take 10 mg by mouth every morning) 90 tablet 1     gemfibrozil (LOPID) 600 MG tablet Take 1 tablet (600 mg) by mouth 2 times daily 180 tablet 3     ibuprofen (ADVIL/MOTRIN) 600 MG tablet Take 1 tablet (600 mg) by mouth every 6 hours as needed for moderate pain 20 tablet 0     losartan (COZAAR) 100 MG tablet Take 1 tablet (100 mg) by mouth daily (Patient taking differently: Take 100 mg by mouth every evening) 90 tablet 0     metFORMIN (GLUCOPHAGE) 500 MG tablet Take 2 tablets (1,000 mg) by mouth 2 times daily (with meals) 360 tablet 0     multivitamin, therapeutic with minerals (THERA-VIT-M) TABS Take 1 tablet by mouth every evening       nitroGLYcerin (NITROSTAT) 0.4 MG sublingual tablet Place 1 tablet (0.4 mg) under the tongue every 5 minutes as needed for chest pain if you are still having symptoms after 3 doses (15 minutes) call 911. 25 tablet 1     oxyCODONE-Acetaminophen 5-300 MG TABS Take by mouth as needed         Allergies  No Known Allergies    Social History  Social History      Socioeconomic History     Marital status:      Spouse name: Not on file     Number of children: Not on file     Years of education: Not on file     Highest education level: Not on file   Occupational History     Not on file   Tobacco Use     Smoking status: Every Day     Packs/day: 1.00     Years: 30.00     Pack years: 30.00     Types: Cigarettes     Smokeless tobacco: Never     Tobacco comments:     may have one once weekly   Vaping Use     Vaping Use: Never used   Substance and Sexual Activity     Alcohol use: Not Currently     Alcohol/week: 6.0 standard drinks     Types: 6 Cans of beer per week     Drug use: No     Sexual activity: Yes     Partners: Female     Birth control/protection: Condom   Other Topics Concern      Service Not Asked     Blood Transfusions Not Asked     Caffeine Concern No     Comment: decaf ,  occas. caff.     Occupational Exposure Not Asked     Hobby Hazards Not Asked     Sleep Concern No     Stress Concern No     Weight Concern Not Asked     Special Diet No     Comment: eating better     Back Care Not Asked     Exercise No     Comment: some walking     Bike Helmet Not Asked     Seat Belt Not Asked     Self-Exams Not Asked     Parent/sibling w/ CABG, MI or angioplasty before 65F 55M? Yes     Comment: Younger brother at age 53. 10-20-15   Social History Narrative     Not on file     Social Determinants of Health     Financial Resource Strain: Not on file   Food Insecurity: Not on file   Transportation Needs: Not on file   Physical Activity: Not on file   Stress: Not on file   Social Connections: Not on file   Intimate Partner Violence: Not on file   Housing Stability: Not on file       Family History  Family History   Problem Relation Age of Onset     Glaucoma Mother      Hypertension Mother      Hyperlipidemia Mother      Hypertension Father      Hyperlipidemia Father      Cerebrovascular Disease Father      Glaucoma Sister      Diabetes Sister      Hypertension Sister       Hyperlipidemia Sister      Colon Cancer Sister      Other Cancer Sister      Diabetes Sister      Hypertension Sister      Diabetes Sister         #3     Hyperlipidemia Sister      Hypertension Sister         #2     Other Cancer Sister      Hypertension Sister      Diabetes Sister      Diabetes Sister      Coronary Artery Disease Brother      Cerebrovascular Disease Brother      Cerebrovascular Disease Brother 52        (older)      Coronary Artery Disease Brother      Cerebrovascular Disease Brother      Macular Degeneration No family hx of      Anesthesia Reaction No family hx of      Deep Vein Thrombosis (DVT) No family hx of        Review of Systems  The complete review of systems is negative other than noted in the HPI or here.   Anesthesia Evaluation   Pt has had prior anesthetic.     No history of anesthetic complications       ROS/MED HX  ENT/Pulmonary:     (+) ALEX risk factors, hypertension, tobacco use, Current use, 1 packs/day,     Neurologic:  - neg neurologic ROS  (-) no seizures and no CVA   Cardiovascular:     (+) hypertension--CAD --stent-. 2 Drug Eluting Stent. Taking blood thinners Previous cardiac testing   Echo: Date: Results:    Stress Test: Date:  Results:  Impression  1. Myocardial perfusion imaging using single isotope technique  demonstrated normal myocardial perfusion.   2. Gated images demonstrated normal wall motion. The left ventricular  systolic function is normal with a calculated ejection fraction of  64%.  ECG Reviewed: Date:  Results:  NSR  Cath:  Date: Results:      METS/Exercise Tolerance: 3 - Able to walk 1-2 blocks without stopping Comment: Snow blowing short drive way, can ascend a flight of stairs without KING or CP   Hematologic:  - neg hematologic  ROS  (-) history of blood clots and history of blood transfusion   Musculoskeletal: Comment: Right elbow closed fracture      GI/Hepatic:  - neg GI/hepatic ROS  (-) GERD   Renal/Genitourinary:     (+) renal  disease, type: CRI, Pt does not require dialysis,     Endo:     (+) type II DM, Last HgA1c: 6.7, date: 12/1/22, Not using insulin,  (-) chronic steroid usage   Psychiatric/Substance Use:  - neg psychiatric ROS     Infectious Disease:  - neg infectious disease ROS     Malignancy:  - neg malignancy ROS     Other:            Virtual visit -  No vitals were obtained    Physical Exam  Constitutional: Awake, alert, cooperative, no apparent distress, and appears stated age.  HENT: Normocephalic  Respiratory: non labored breathing   Neurologic: Awake, alert, oriented to name, place and time.   Neuropsychiatric: Calm, cooperative. Normal affect.      Prior Labs/Diagnostic Studies   All labs and imaging personally reviewed     EKG/ stress test - if available please see in ROS above   No results found.  No flowsheet data found.      The patient's records and results personally reviewed by this provider.     Outside records reviewed from: Care Everywhere      Assessment      Dago Dangelo is a 62 year old male seen as a PAC referral for risk assessment and optimization for anesthesia.    Plan/Recommendations  Pt will be optimized for the proposed procedure.  See below for details on the assessment, risk, and preoperative recommendations    NEUROLOGY  - No history of TIA, CVA or seizure  -Post Op delirium risk factors:  No risk identified    ENT  - No current airway concerns.  Will need to be reassessed day of surgery.  Mallampati: Unable to assess  TM: Unable to assess    CARDIAC  - No history of Afib   -hypertension using cozaar, hygroton and coreg  -CAD s/p KIARRA x2in 2015. Using ASA and Brilinta- will hold.  He is also on medical management with statin and coreg. Reports he has not needed to use nitroglycerin for many years. He completed stress test in 2018 with normal perfusion, EF 64%. Reports he can walk a few blocks and ascend a flight of stairs without CP or KING- reports limiting factor is foot pain.   - METS  "(Metabolic Equivalents)  Patient CANNOT perform 4 METS exercise without symptoms            Total Score: 1    Functional Capacity: Unable to complete 4 METS      RCRI-Low risk: Class 2 0.9% complication rate            Total Score: 1    RCRI: CAD        PULMONARY  ALEX Medium Risk            Total Score: 3    ALEX: Hypertension    ALEX: Over 50 ys old    ALEX: Male      - Denies asthma or inhaler use  - Tobacco History      History   Smoking Status     Every Day     Packs/day: 1.00     Years: 30.00     Types: Cigarettes   Smokeless Tobacco     Never       GI  PONV Low Risk  Total Score: 1           1 AN PONV: Intended Post Op Opioids        /RENAL  - Baseline Creatinine  WNL    ENDOCRINE    - BMI: Estimated body mass index is 30.12 kg/m  as calculated from the following:    Height as of 12/1/22: 1.651 m (5' 5\").    Weight as of 1/24/23: 82.1 kg (181 lb).  Obesity (BMI >30)  - Diabetes  Hemoglobin A1C (%)   Date Value   12/01/2022 6.7 (H)   07/08/2021 8.3 (H)     Diabetes Mellitus, Type 2, non-insulin dependent.  Hold morning oral hypoglycemic medications. Recommend close monitoring of the patient's blood glucose levels throughout the perioperative period.    HEME  VTE Low Risk 0.5%            Total Score: 3    VTE: Greater than 59 yrs old    VTE: Male      - Platelet disfunction second to Aspirin (Mahi, many others) and Ticegralor (Brillinta)    MSK  -right elbow injury with above procedure planned     Different anesthesia methods/types have been discussed with the patient, but they are aware that the final plan will be decided by the assigned anesthesia provider on the date of service.    The patient is optimized for their procedure. AVS with information on surgery time/arrival time, meds and NPO status given by nursing staff. No further diagnostic testing indicated.    Please refer to the physical examination documented by the anesthesiologist in the anesthesia record on the day of surgery.    Video-Visit " Details    Type of service:  Video Visit    Provider received verbal consent for a Video Visit from the patient? Yes   Video Start Time: 0840  Video End Time:0901    Originating Location (pt. Location): Home    Distant Location (provider location):  Off-site  Mode of Communication:  Video Conference via [employer]  On the day of service:     Prep time: 11 minutes  Visit time: 21 minutes  Documentation time: 8 minutes  ------------------------------------------  Total time: 40 minutes      Lesley James PA-C  Preoperative Assessment Center  [facility]  Clinic and [facility] Center  Phone: [phone]  Fax: [phone]

## 2023-01-27 NOTE — ED PROVIDER NOTES
History      Chief Complaint:  Elbow Injury        The history is provided by the patient.      Dago Dangelo is a 62 year old right-handed male who presents with elbow injury. The patient reports that he was using his snow-blower when he slipped on ice and fell sideways directly onto his right elbow. He states that this occurred today around 1230.  He took 4 ibuprofen prior to arrival. Patient's right elbow appears deformed and swollen. He denies numbness or tingling in fingers or hand. His right hand and fingers are also swollen. He reports pain in his right elbow with any movement of the forearm or wrist. He denies hitting his head. He denies headache, loss of consciousness, neck pain, vomiting, chest pain, shortness of breath, or any wounds. He endorses use of Brilinta due to prior MI.     Independent Historian: The patient was the sole provider of history.       ROS:  Review of Systems   Respiratory: Negative for shortness of breath.    Cardiovascular: Negative for chest pain.   Gastrointestinal: Negative for vomiting.   Musculoskeletal: Positive for arthralgias, joint swelling and myalgias. Negative for neck pain.   Neurological: Negative for numbness and headaches.   All other systems reviewed and are negative.     Allergies:  No known drug allergies     Medications:    Aspirin 81 mg  Lipitor  Coreg  Brilinta  Hygroton  Farxiga  Lopid  Cozaar  Metformin  Nitroglycerin     Past Medical History:    Arthritis  CAD  Diabetes mellitus  Hypertension  Hyperlipidemia  NSTEMI  Tobacco dependence     Past Surgical History:    Colonoscopy  Stent, coronary, KIARRA=OM1, Mid L Cx      Family History:    Father: Cerebrovascular disease, hyperlipidemia, hypertension  Mother: Glaucoma, hyperlipidemia, hypertension  Sister: Colon cancer, diabetes, hypertension, other cancer  Brother: Cerebrovascular disease, CAD     Social History:  The patient presents to the ED with his sister. They arrived via private vehicle. Records  report that he has been smoking cigarettes. He has a 30.00 pack-year smoking history. He has never used smokeless tobacco. He reports current alcohol use. He reports that he does not use drugs.  PCP: Tl Ortiz      Physical Exam      Patient Vitals for the past 24 hrs:    BP Temp Temp src Pulse Resp SpO2   01/20/23 0148 (!) 142/76 -- -- 76 18 99 %   01/19/23 2350 (!) 150/78 -- -- 80 13 100 %   01/19/23 2345 (!) 151/83 -- -- 82 18 100 %   01/19/23 2340 (!) 151/77 -- -- 79 16 100 %   01/19/23 2315 -- -- -- 82 11 97 %   01/19/23 2310 131/79 -- -- -- -- --   01/19/23 2304 (!) 154/85 -- -- 82 12 97 %   01/19/23 2205 -- -- -- 73 11 97 %   01/19/23 2200 130/81 -- -- 72 15 95 %   01/19/23 2155 132/75 -- -- 69 15 99 %   01/19/23 2152 -- -- -- -- 20 --   01/19/23 2151 137/79 -- -- -- -- --   01/19/23 2150 -- -- -- 69 18 99 %   01/19/23 2144 -- -- -- 78 12 99 %   01/19/23 2140 (!) 149/84 -- -- -- -- --   01/19/23 2135 (!) 153/88 98.1  F (36.7  C) Oral 78 18 94 %   01/19/23 1954 (!) 151/76 98.1  F (36.7  C) Oral 84 18 97 %         Physical Exam  Constitutional: Alert, attentive, GCS 15  HENT:    Nose: Nose normal.    Mouth/Throat: Oropharynx is clear, mucous membranes are moist   Eyes: EOM are normal.   CV: regular rate and rhythm; no murmurs, rubs or gallups  Chest: Effort normal and breath sounds normal.   GI:  There is no tenderness. No distension. Normal bowel sounds  MSK: Swollen and deformed right elbow with restricted range of motion secondary to pain. Limited flexion and extension of fingers due to dependent swelling. Radial pulse intact and appropriate.   Neurological: Alert, attentive  Skin: Skin is warm and dry.        Emergency Department Course      Imaging:  CT Elbow Right w/o Contrast   Final Result   IMPRESSION:   1.  Comminuted fracture of the coronoid process. Questionable minimally displaced fracture of the capitellum. Elbow joint well aligned.           Radius/Ulna XR, PA & LAT, right   Final  Result   IMPRESSION: There is a fracture dislocation of the right elbow, incompletely imaged and evaluated. Correlation with earlier elbow radiographs is recommended.       No additional sites of fracture identified. The distal radioulnar joint appears intact. Minimal osteoarthritic changes are present.       XR Elbow Right 2 Views   Final Result   IMPRESSION: Limited osseous detail, due to overlying splint material.       Incomplete reduction of the right elbow; an obstructing fracture fragment cannot be excluded. Correlation with CT imaging is recommended.       There is a fracture of the posterior capitellum, with largest fragment located at the posterior joint space. Likely additional fracture of the coronoid process, incompletely evaluated. Possible impacted fracture of the lateral humeral epicondyle.    Attention on CT imaging follow-up.       Elbow joint effusion is present.       Elbow XR, G/E 3 views, right   Final Result   IMPRESSION: There is a posterior dislocation of the elbow joint with fracture of the coronoid process. The fracture is mildly comminuted and the fracture fragments appear likely loose within the joint. Recommend repeat films following reduction. The    humerus is otherwise negative. Elbow joint effusion. Soft tissue swelling about the elbow. The glenohumeral joint is negative for fracture or dislocation. There is hypertrophic change at the AC joint.       Humerus XR, G/E 2 views, right   Final Result   IMPRESSION: There is a posterior dislocation of the elbow joint with fracture of the coronoid process. The fracture is mildly comminuted and the fracture fragments appear likely loose within the joint. Recommend repeat films following reduction. The    humerus is otherwise negative. Elbow joint effusion. Soft tissue swelling about the elbow. The glenohumeral joint is negative for fracture or dislocation. There is hypertrophic change at the AC joint.          Report per  radiology     Laboratory:  Labs Ordered and Resulted from Time of ED Arrival to Time of ED Departure - No data to display      [facility]    -Fracture     Date/Time: 1/19/2023 9:09 PM  Performed by: Alayna Mejía MD  Authorized by: Alayna Mejía MD      Risks, benefits and alternatives discussed.   ED EVALUATION:      Assessment Time: 1/19/2023 9:10 PM      I have performed an Emergency Department Evaluation including taking a history and physical examination, this evaluation will be documented in the electronic medical record for this ED encounter.    Indication: fracture dislocation reduction right elbow    ASA Class: Class 2- mild systemic disease, no acute problems, no functional limitations    Mallampati: Grade 2- soft palate, base of uvula, tonsillar pillars, and portion of posterior pharyngeal wall visible    NPO Status: not NPO, emergent situation     UNIVERSAL PROTOCOL   Site Marked: NA  Prior Images Obtained and Reviewed:  Yes  Required items: Required blood products, implants, devices and special equipment available    Patient identity confirmed:  Verbally with patient and arm band  Patient was reevaluated immediately before administering moderate or deep sedation or anesthesia  Confirmation Checklist:  Patient's identity using two indicators  Time out: Immediately prior to the procedure a time out was called    Universal Protocol: the Joint Commission Universal Protocol was followed    Preparation: Patient was prepped and draped in usual sterile fashion (Equipment prepared at bedside, no sterile prep required)       INJURY      Injury location:  Elbow    Elbow injury location:  R elbow    Elbow fracture type comment:  Fracture dislocation     PRE PROCEDURE ASSESSMENT      Neurological function: normal      Distal perfusion: normal      Range of motion: reduced       SEDATION  Patient Sedated: Yes    Sedation Type:  Moderate (conscious)  sedation  Sedation:  Propofol  Vital signs: Vital signs monitored during sedation       ANESTHESIA (see MAR for exact dosages)      Anesthesia method:  None     PROCEDURE DETAILS:     Manipulation performed: yes      Skeletal traction used: yes      Reduction successful: yes      X-ray confirmed reduction: yes      Immobilization:  Splint    Splint type:  Long arm    Supplies used:  Ortho-Glass     POST PROCEDURE ASSESSMENT      Neurological function: normal      Distal perfusion: normal      Range of motion: improved          PROCEDURE  Describe Procedure: Traction and counter traction were applied to the right arm.  Elbow mainulpated until alignment improved and ROM improved.  Posterior long arm splint placed.  CMS intact after reduction.  Patient Tolerance:  Patient tolerated the procedure well with no immediate complications  Length of time physician/provider present for 1:1 monitoring during sedation: 20     Emergency Department Course & Assessments:     Interventions:  Medications   oxyCODONE (ROXICODONE) tablet 5 mg (5 mg Oral Not Given 1/19/23 2010)   HYDROmorphone (PF) (DILAUDID) injection 0.5 mg (0.5 mg Intravenous Given 1/19/23 2116)   propofol (DIPRIVAN) injection 10 mg/mL vial (80 mg Intravenous Given 1/19/23 2147)   propofol (DIPRIVAN) injection 10 mg/mL vial (20 mg Intravenous Given 1/19/23 2149)   HYDROmorphone (PF) (DILAUDID) injection 0.5 mg (0.5 mg Intravenous Given 1/19/23 2237)   propofol (DIPRIVAN) injection 10 mg/mL vial (100 mg Intravenous Given 1/19/23 2304)   propofol (DIPRIVAN) injection 10 mg/mL vial (100 mg Intravenous Given 1/19/23 2308)   HYDROmorphone (PF) (DILAUDID) injection 0.5 mg (0.5 mg Intravenous Given 1/20/23 0010)         Independent Interpretation (X-rays, CTs, rhythm strip):  2038 Elbow xray: posterior dislocation of right elbow and fracture fragments in joint space.  2229 Elbow xray: incomplete reduction of posterior right elbow dislocation.               Ulna and radius:  no fracture identified        Consultations/Discussion of Management or Tests:  2054    I obtained history and examined the patient as noted above.   2100    Case discussed with Dr Holt who is supervising physician     Social Determinants of Health affecting care:  None      Disposition:  The patient was discharged to home.      Impression & Plan       Medical Decision Making:  Patient is a pleasant 61 yo M who presents with right elbow pain after mechanical FOOSH on ice this afternoon. He is in no acute distress. Physical examination reveals right elbow deformity with significant swelling and reduced range of motion. Right hand also appears swollen. No numbness or tingling distal to injury per patient. Radial pulse present. Fracture and/or deformity suspected.  Xray shows posterior dislocation of right elbow and comminuted fracture of the coronoid process. Forearm xray show no acute fracture. CSM remains intact distal to injury. Results reviewed and patient staffed with Dr. Mejía who recommends procedural sedation with reduction. Please see Dr. Mejía's note for more information.  Patient sedated with propofol. Initial reduction was incomplete on post-reduction xrays. Procedural sedation repeated and secondary reduction performed with post-reduction CT. Patient tolerated procedures well. See Dr. Mejía procedural notes.  CT shows complete reduction of right elbow joint and possible capitellum fracture. Results reviewed with Dr. Mejía who recommends outpatient follow-up with orthopedics and pain medication.  Results discussed with patient. His pain is manageable at this time. He is aware he will need orthopedic consultation and possible surgery. [facility] information given and phone referral was placed this evening. Patient otherwise had a negative trauma evaluation and is safe for discharge. Supportive cares including rest, elevation of affected extremity, and ibuprofen, and return precautions to ED  discussed. Patient expressed understanding of plan and was ready for discharge.     Diagnosis:      ICD-10-CM     1. Closed fracture dislocation of right elbow, initial encounter  S42.401A         2. Fall, initial encounter  W19.XXXA               Discharge Medications:      Discharge Medication List as of 1/20/2023  1:38 AM           START taking these medications     Details   oxyCODONE-acetaminophen (PERCOCET) 5-325 MG tablet Take 1 tablet by mouth every 6 hours as needed for pain, Disp-10 tablet, R-0, E-Prescribe                 Scribe Disclosure:  I, Juliano Balderas, am serving as a scribe on 1/19/2023 at 9:05 PM to personally document services performed by Alayna Mejía MD based on my observations and the provider's statements to me.         Carla Cuevas PA-C  01/20/23 0142        MD Alfonzo Montes Kristi Jo Schneider, MD  01/20/23 1025          Carla Cuevas PA-C  01/26/23 6022

## 2023-01-31 ENCOUNTER — HOSPITAL ENCOUNTER (OUTPATIENT)
Dept: GENERAL RADIOLOGY | Facility: CLINIC | Age: 63
Discharge: HOME | End: 2023-01-31
Attending: STUDENT IN AN ORGANIZED HEALTH CARE EDUCATION/TRAINING PROGRAM
Payer: COMMERCIAL

## 2023-01-31 ENCOUNTER — HOSPITAL ENCOUNTER (OUTPATIENT)
Dept: MRI IMAGING | Facility: CLINIC | Age: 63
Discharge: HOME | End: 2023-01-31
Attending: STUDENT IN AN ORGANIZED HEALTH CARE EDUCATION/TRAINING PROGRAM
Payer: COMMERCIAL

## 2023-01-31 DIAGNOSIS — S42.401A CLOSED FRACTURE DISLOCATION OF RIGHT ELBOW, INITIAL ENCOUNTER: ICD-10-CM

## 2023-01-31 PROCEDURE — 73221 MRI JOINT UPR EXTREM W/O DYE: CPT | Mod: RT

## 2023-01-31 PROCEDURE — 73221 MRI JOINT UPR EXTREM W/O DYE: CPT | Mod: 26 | Performed by: RADIOLOGY

## 2023-01-31 PROCEDURE — 70200 X-RAY EXAM OF EYE SOCKETS: CPT

## 2023-02-01 ENCOUNTER — HOSPITAL ENCOUNTER (OUTPATIENT)
Facility: CLINIC | Age: 63
Discharge: HOME | End: 2023-02-01
Attending: STUDENT IN AN ORGANIZED HEALTH CARE EDUCATION/TRAINING PROGRAM | Admitting: STUDENT IN AN ORGANIZED HEALTH CARE EDUCATION/TRAINING PROGRAM
Payer: COMMERCIAL

## 2023-02-01 ENCOUNTER — APPOINTMENT (OUTPATIENT)
Dept: GENERAL RADIOLOGY | Facility: CLINIC | Age: 63
End: 2023-02-01
Attending: STUDENT IN AN ORGANIZED HEALTH CARE EDUCATION/TRAINING PROGRAM
Payer: COMMERCIAL

## 2023-02-01 VITALS
HEART RATE: 77 BPM | TEMPERATURE: 99.3 F | OXYGEN SATURATION: 96 % | BODY MASS INDEX: 29.46 KG/M2 | RESPIRATION RATE: 16 BRPM | DIASTOLIC BLOOD PRESSURE: 85 MMHG | WEIGHT: 176.81 LBS | SYSTOLIC BLOOD PRESSURE: 170 MMHG | HEIGHT: 65 IN

## 2023-02-01 DIAGNOSIS — S42.401A CLOSED FRACTURE DISLOCATION OF RIGHT ELBOW, INITIAL ENCOUNTER: Primary | ICD-10-CM

## 2023-02-01 LAB
GLUCOSE BLDC GLUCOMTR-MCNC: 176 MG/DL (ref 70–99)
GLUCOSE BLDC GLUCOMTR-MCNC: 184 MG/DL (ref 70–99)

## 2023-02-01 PROCEDURE — 250N000009 HC RX 250: Performed by: NURSE ANESTHETIST, CERTIFIED REGISTERED

## 2023-02-01 PROCEDURE — 370N000017 HC ANESTHESIA TECHNICAL FEE, PER MIN: Performed by: STUDENT IN AN ORGANIZED HEALTH CARE EDUCATION/TRAINING PROGRAM

## 2023-02-01 PROCEDURE — 250N000011 HC RX IP 250 OP 636: Performed by: NURSE ANESTHETIST, CERTIFIED REGISTERED

## 2023-02-01 PROCEDURE — 250N000011 HC RX IP 250 OP 636: Performed by: STUDENT IN AN ORGANIZED HEALTH CARE EDUCATION/TRAINING PROGRAM

## 2023-02-01 PROCEDURE — 999N000180 XR SURGERY CARM FLUORO LESS THAN 5 MIN: Mod: TC

## 2023-02-01 PROCEDURE — 360N000083 HC SURGERY LEVEL 3 W/ FLUORO, PER MIN: Performed by: STUDENT IN AN ORGANIZED HEALTH CARE EDUCATION/TRAINING PROGRAM

## 2023-02-01 PROCEDURE — 250N000011 HC RX IP 250 OP 636: Performed by: PHYSICIAN ASSISTANT

## 2023-02-01 PROCEDURE — 271N000001 HC OR GENERAL SUPPLY NON-STERILE: Performed by: STUDENT IN AN ORGANIZED HEALTH CARE EDUCATION/TRAINING PROGRAM

## 2023-02-01 PROCEDURE — 250N000013 HC RX MED GY IP 250 OP 250 PS 637: Performed by: ANESTHESIOLOGY

## 2023-02-01 PROCEDURE — 82962 GLUCOSE BLOOD TEST: CPT

## 2023-02-01 PROCEDURE — C1713 ANCHOR/SCREW BN/BN,TIS/BN: HCPCS | Performed by: STUDENT IN AN ORGANIZED HEALTH CARE EDUCATION/TRAINING PROGRAM

## 2023-02-01 PROCEDURE — 999N000141 HC STATISTIC PRE-PROCEDURE NURSING ASSESSMENT: Performed by: STUDENT IN AN ORGANIZED HEALTH CARE EDUCATION/TRAINING PROGRAM

## 2023-02-01 PROCEDURE — 272N000001 HC OR GENERAL SUPPLY STERILE: Performed by: STUDENT IN AN ORGANIZED HEALTH CARE EDUCATION/TRAINING PROGRAM

## 2023-02-01 PROCEDURE — 24343 REPR ELBOW LAT LIGMNT W/TISS: CPT | Mod: RT | Performed by: STUDENT IN AN ORGANIZED HEALTH CARE EDUCATION/TRAINING PROGRAM

## 2023-02-01 PROCEDURE — 250N000025 HC SEVOFLURANE, PER MIN: Performed by: STUDENT IN AN ORGANIZED HEALTH CARE EDUCATION/TRAINING PROGRAM

## 2023-02-01 PROCEDURE — 710N000012 HC RECOVERY PHASE 2, PER MINUTE: Performed by: STUDENT IN AN ORGANIZED HEALTH CARE EDUCATION/TRAINING PROGRAM

## 2023-02-01 PROCEDURE — 710N000009 HC RECOVERY PHASE 1, LEVEL 1, PER MIN: Performed by: STUDENT IN AN ORGANIZED HEALTH CARE EDUCATION/TRAINING PROGRAM

## 2023-02-01 PROCEDURE — 258N000003 HC RX IP 258 OP 636: Performed by: NURSE ANESTHETIST, CERTIFIED REGISTERED

## 2023-02-01 DEVICE — KIT INST IMP ARTHREX BRACE SWIVELK 4.75X3.5MM AR-1788J-CP: Type: IMPLANTABLE DEVICE | Site: WRIST | Status: FUNCTIONAL

## 2023-02-01 RX ORDER — FENTANYL CITRATE 50 UG/ML
25 INJECTION, SOLUTION INTRAMUSCULAR; INTRAVENOUS EVERY 5 MIN PRN
Status: DISCONTINUED | OUTPATIENT
Start: 2023-02-01 | End: 2023-02-06 | Stop reason: HOSPADM

## 2023-02-01 RX ORDER — SODIUM CHLORIDE, SODIUM LACTATE, POTASSIUM CHLORIDE, CALCIUM CHLORIDE 600; 310; 30; 20 MG/100ML; MG/100ML; MG/100ML; MG/100ML
INJECTION, SOLUTION INTRAVENOUS CONTINUOUS
Status: DISCONTINUED | OUTPATIENT
Start: 2023-02-01 | End: 2023-02-01 | Stop reason: HOSPADM

## 2023-02-01 RX ORDER — OXYCODONE HYDROCHLORIDE 5 MG/1
5 TABLET ORAL EVERY 4 HOURS PRN
Status: DISCONTINUED | OUTPATIENT
Start: 2023-02-01 | End: 2023-02-06 | Stop reason: HOSPADM

## 2023-02-01 RX ORDER — ONDANSETRON 2 MG/ML
INJECTION INTRAMUSCULAR; INTRAVENOUS PRN
Status: DISCONTINUED | OUTPATIENT
Start: 2023-02-01 | End: 2023-02-01

## 2023-02-01 RX ORDER — ONDANSETRON 4 MG/1
4 TABLET, ORALLY DISINTEGRATING ORAL EVERY 30 MIN PRN
Status: DISCONTINUED | OUTPATIENT
Start: 2023-02-01 | End: 2023-02-06 | Stop reason: HOSPADM

## 2023-02-01 RX ORDER — CEFAZOLIN SODIUM/WATER 2 G/20 ML
2 SYRINGE (ML) INTRAVENOUS
Status: COMPLETED | OUTPATIENT
Start: 2023-02-01 | End: 2023-02-01

## 2023-02-01 RX ORDER — SODIUM CHLORIDE, SODIUM LACTATE, POTASSIUM CHLORIDE, CALCIUM CHLORIDE 600; 310; 30; 20 MG/100ML; MG/100ML; MG/100ML; MG/100ML
INJECTION, SOLUTION INTRAVENOUS CONTINUOUS
Status: DISCONTINUED | OUTPATIENT
Start: 2023-02-01 | End: 2023-02-06 | Stop reason: HOSPADM

## 2023-02-01 RX ORDER — FENTANYL CITRATE-0.9 % NACL/PF 10 MCG/ML
PLASTIC BAG, INJECTION (ML) INTRAVENOUS PRN
Status: DISCONTINUED | OUTPATIENT
Start: 2023-02-01 | End: 2023-02-01

## 2023-02-01 RX ORDER — OXYCODONE HYDROCHLORIDE 5 MG/1
10 TABLET ORAL EVERY 4 HOURS PRN
Status: DISCONTINUED | OUTPATIENT
Start: 2023-02-01 | End: 2023-02-06 | Stop reason: HOSPADM

## 2023-02-01 RX ORDER — PROPOFOL 10 MG/ML
INJECTION, EMULSION INTRAVENOUS PRN
Status: DISCONTINUED | OUTPATIENT
Start: 2023-02-01 | End: 2023-02-01

## 2023-02-01 RX ORDER — CEFAZOLIN SODIUM/WATER 2 G/20 ML
2 SYRINGE (ML) INTRAVENOUS SEE ADMIN INSTRUCTIONS
Status: DISCONTINUED | OUTPATIENT
Start: 2023-02-01 | End: 2023-02-01 | Stop reason: HOSPADM

## 2023-02-01 RX ORDER — HYDROMORPHONE HYDROCHLORIDE 1 MG/ML
0.2 INJECTION, SOLUTION INTRAMUSCULAR; INTRAVENOUS; SUBCUTANEOUS EVERY 5 MIN PRN
Status: DISCONTINUED | OUTPATIENT
Start: 2023-02-01 | End: 2023-02-06 | Stop reason: HOSPADM

## 2023-02-01 RX ORDER — HYDROMORPHONE HYDROCHLORIDE 1 MG/ML
0.4 INJECTION, SOLUTION INTRAMUSCULAR; INTRAVENOUS; SUBCUTANEOUS EVERY 5 MIN PRN
Status: DISCONTINUED | OUTPATIENT
Start: 2023-02-01 | End: 2023-02-06 | Stop reason: HOSPADM

## 2023-02-01 RX ORDER — BUPIVACAINE HYDROCHLORIDE 2.5 MG/ML
INJECTION, SOLUTION INFILTRATION; PERINEURAL PRN
Status: DISCONTINUED | OUTPATIENT
Start: 2023-02-01 | End: 2023-02-01 | Stop reason: HOSPADM

## 2023-02-01 RX ORDER — OXYCODONE HYDROCHLORIDE 5 MG/1
5 TABLET ORAL EVERY 6 HOURS PRN
Qty: 12 TABLET | Refills: 0 | Status: SHIPPED | OUTPATIENT
Start: 2023-02-01 | End: 2023-02-04

## 2023-02-01 RX ORDER — SODIUM CHLORIDE, SODIUM LACTATE, POTASSIUM CHLORIDE, CALCIUM CHLORIDE 600; 310; 30; 20 MG/100ML; MG/100ML; MG/100ML; MG/100ML
INJECTION, SOLUTION INTRAVENOUS CONTINUOUS PRN
Status: DISCONTINUED | OUTPATIENT
Start: 2023-02-01 | End: 2023-02-01

## 2023-02-01 RX ORDER — LIDOCAINE HYDROCHLORIDE 20 MG/ML
INJECTION, SOLUTION INFILTRATION; PERINEURAL PRN
Status: DISCONTINUED | OUTPATIENT
Start: 2023-02-01 | End: 2023-02-01

## 2023-02-01 RX ORDER — FENTANYL CITRATE 50 UG/ML
INJECTION, SOLUTION INTRAMUSCULAR; INTRAVENOUS PRN
Status: DISCONTINUED | OUTPATIENT
Start: 2023-02-01 | End: 2023-02-01

## 2023-02-01 RX ORDER — FENTANYL CITRATE 50 UG/ML
50 INJECTION, SOLUTION INTRAMUSCULAR; INTRAVENOUS EVERY 5 MIN PRN
Status: DISCONTINUED | OUTPATIENT
Start: 2023-02-01 | End: 2023-02-06 | Stop reason: HOSPADM

## 2023-02-01 RX ORDER — ACETAMINOPHEN 325 MG/1
975 TABLET ORAL ONCE
Status: COMPLETED | OUTPATIENT
Start: 2023-02-01 | End: 2023-02-01

## 2023-02-01 RX ORDER — OXYCODONE HYDROCHLORIDE 10 MG/1
10 TABLET ORAL EVERY 4 HOURS PRN
Status: DISCONTINUED | OUTPATIENT
Start: 2023-02-01 | End: 2023-02-06 | Stop reason: HOSPADM

## 2023-02-01 RX ORDER — ONDANSETRON 2 MG/ML
4 INJECTION INTRAMUSCULAR; INTRAVENOUS EVERY 30 MIN PRN
Status: DISCONTINUED | OUTPATIENT
Start: 2023-02-01 | End: 2023-02-06 | Stop reason: HOSPADM

## 2023-02-01 RX ORDER — LIDOCAINE 40 MG/G
CREAM TOPICAL
Status: DISCONTINUED | OUTPATIENT
Start: 2023-02-01 | End: 2023-02-01 | Stop reason: HOSPADM

## 2023-02-01 RX ADMIN — HYDROMORPHONE HYDROCHLORIDE 0.5 MG: 1 INJECTION, SOLUTION INTRAMUSCULAR; INTRAVENOUS; SUBCUTANEOUS at 13:06

## 2023-02-01 RX ADMIN — MIDAZOLAM 2 MG: 1 INJECTION INTRAMUSCULAR; INTRAVENOUS at 12:30

## 2023-02-01 RX ADMIN — FENTANYL CITRATE 50 MCG: 50 INJECTION, SOLUTION INTRAMUSCULAR; INTRAVENOUS at 12:34

## 2023-02-01 RX ADMIN — ONDANSETRON 4 MG: 2 INJECTION INTRAMUSCULAR; INTRAVENOUS at 12:47

## 2023-02-01 RX ADMIN — SODIUM CHLORIDE, POTASSIUM CHLORIDE, SODIUM LACTATE AND CALCIUM CHLORIDE: 600; 310; 30; 20 INJECTION, SOLUTION INTRAVENOUS at 14:09

## 2023-02-01 RX ADMIN — SODIUM CHLORIDE, POTASSIUM CHLORIDE, SODIUM LACTATE AND CALCIUM CHLORIDE: 600; 310; 30; 20 INJECTION, SOLUTION INTRAVENOUS at 12:27

## 2023-02-01 RX ADMIN — PROPOFOL 200 MG: 10 INJECTION, EMULSION INTRAVENOUS at 12:34

## 2023-02-01 RX ADMIN — Medication 100 MCG: at 13:02

## 2023-02-01 RX ADMIN — LIDOCAINE HYDROCHLORIDE 50 MG: 20 INJECTION, SOLUTION INFILTRATION; PERINEURAL at 12:34

## 2023-02-01 RX ADMIN — Medication 2 G: at 12:25

## 2023-02-01 RX ADMIN — ACETAMINOPHEN 975 MG: 325 TABLET ORAL at 15:00

## 2023-02-01 RX ADMIN — FENTANYL CITRATE 50 MCG: 50 INJECTION, SOLUTION INTRAMUSCULAR; INTRAVENOUS at 12:43

## 2023-02-01 ASSESSMENT — ACTIVITIES OF DAILY LIVING (ADL)
ADLS_ACUITY_SCORE: 35

## 2023-02-01 ASSESSMENT — LIFESTYLE VARIABLES: TOBACCO_USE: 1

## 2023-02-01 ASSESSMENT — ENCOUNTER SYMPTOMS: SEIZURES: 0

## 2023-02-01 NOTE — OR NURSING
Dr. Osman paged and notified that patient took baby aspirin 1/28 (Saturday), took Brilinta 1/29 (Sunday) and took ibuprofen yesterday 1/31.

## 2023-02-01 NOTE — INTERVAL H&P NOTE
"I have reviewed the surgical (or preoperative) H&P that is linked to this encounter, and examined the patient. There are no significant changes    Clinical Conditions Present on Arrival:  Clinically Significant Risk Factors Present on Admission                   # Drug Induced Platelet Defect: home medication list includes an antiplatelet medication  # DMII: A1C = 6.7 % (Ref range: 0.0 - 5.6 %) within past 3 months  # Overweight: Estimated body mass index is 29.42 kg/m  as calculated from the following:    Height as of this encounter: 1.651 m (5' 5\").    Weight as of this encounter: 80.2 kg (176 lb 12.9 oz).       "

## 2023-02-01 NOTE — ANESTHESIA PREPROCEDURE EVALUATION
Anesthesia Pre-Procedure Evaluation    Patient: Dago Dangelo   MRN: [id number] : 1960        Procedure : Procedure(s):  EXAM UNDER ANESTHESIA RIGHT ELBOW, Possible REPAIR LATERAL ULNAR COLLATERAL LIGAMENT AND CORONOID PROCESS          Past Medical History:   Diagnosis Date     Arthritis 1984     CAD (coronary artery disease) 2015    multivessel diffuse disease, Promus KIARRA x2, OM 1, CFX     Diabetes mellitus (H)      HTN (hypertension)      Hyperlipidemia      NSTEMI (non-ST elevated myocardial infarction) (H)      Obese      Tobacco dependence       Past Surgical History:   Procedure Laterality Date     COLONOSCOPY       STENT, CORONARY, JENNIFER      KIARRA=OM1, Mid L Cx      No Known Allergies   Social History     Tobacco Use     Smoking status: Every Day     Packs/day: 1.00     Years: 30.00     Pack years: 30.00     Types: Cigarettes     Smokeless tobacco: Never     Tobacco comments:     may have one once weekly   Substance Use Topics     Alcohol use: Not Currently     Alcohol/week: 6.0 standard drinks     Types: 6 Cans of beer per week      Wt Readings from Last 1 Encounters:   23 80.2 kg (176 lb 12.9 oz)        Anesthesia Evaluation   Pt has had prior anesthetic.     No history of anesthetic complications       ROS/MED HX  ENT/Pulmonary:     (+) ALEX risk factors, hypertension, tobacco use, Current use, 1 packs/day,     Neurologic:  - neg neurologic ROS  (-) no seizures and no CVA   Cardiovascular:     (+) hypertension--CAD --stent-. 2 Drug Eluting Stent. Taking blood thinners Previous cardiac testing   Echo: Date: Results:    Stress Test: Date:  Results:  Impression  1. Myocardial perfusion imaging using single isotope technique  demonstrated normal myocardial perfusion.   2. Gated images demonstrated normal wall motion. The left ventricular  systolic function is normal with a calculated ejection fraction of  64%.  ECG Reviewed: Date:  Results:  NSR  Cath:  Date: Results:       METS/Exercise Tolerance: 3 - Able to walk 1-2 blocks without stopping Comment: Snow blowing short drive way, can ascend a flight of stairs without KING or CP   Hematologic:  - neg hematologic  ROS  (-) history of blood clots and history of blood transfusion   Musculoskeletal: Comment: Right elbow closed fracture      GI/Hepatic:  - neg GI/hepatic ROS  (-) GERD   Renal/Genitourinary:     (+) renal disease, type: CRI, Pt does not require dialysis,     Endo:     (+) type II DM, Last HgA1c: 6.7, date: 12/1/22, Not using insulin,  (-) chronic steroid usage   Psychiatric/Substance Use:  - neg psychiatric ROS     Infectious Disease:  - neg infectious disease ROS     Malignancy:  - neg malignancy ROS     Other:            Physical Exam    Airway  airway exam normal      Mallampati: II   TM distance: > 3 FB   Neck ROM: full   Mouth opening: > 3 cm    Respiratory Devices and Support         Dental       (+) Completely normal teeth      Cardiovascular   cardiovascular exam normal          Pulmonary   pulmonary exam normal                OUTSIDE LABS:  CBC:   Lab Results   Component Value Date    WBC 8.6 02/19/2018    WBC 10.2 12/22/2015    HGB 13.7 02/19/2018    HGB 14.1 12/22/2015    HCT 41.2 02/19/2018    HCT 40.4 12/22/2015     02/19/2018     12/22/2015     BMP:   Lab Results   Component Value Date     02/11/2022     01/22/2021    POTASSIUM 4.0 02/11/2022    POTASSIUM 3.8 01/22/2021    CHLORIDE 103 02/11/2022    CHLORIDE 102 01/22/2021    CO2 25 02/11/2022    CO2 27 01/22/2021    BUN 17 02/11/2022    BUN 12 01/22/2021    CR 0.80 02/11/2022    CR 0.81 01/22/2021     (H) 02/01/2023     (H) 02/11/2022     COAGS: No results found for: PTT, INR, FIBR  POC:   Lab Results   Component Value Date     (H) 10/28/2016     HEPATIC:   Lab Results   Component Value Date    ALBUMIN 4.2 08/17/2018    PROTTOTAL 7.4 08/17/2018    ALT 21 10/12/2021    AST 17 08/17/2018    ALKPHOS 67 08/17/2018     BILITOTAL 0.4 08/17/2018     OTHER:   Lab Results   Component Value Date    A1C 6.7 (H) 12/01/2022    TONE 9.4 02/11/2022    TSH 0.69 02/20/2018       Anesthesia Plan    ASA Status:  3   NPO Status:  NPO Appropriate    Anesthesia Type: General.     - Airway: LMA   Induction: Intravenous.   Maintenance: Balanced.        Consents    Anesthesia Plan(s) and associated risks, benefits, and realistic alternatives discussed. Questions answered and patient/representative(s) expressed understanding.    - Discussed:     - Discussed with:  Patient      - Extended Intubation/Ventilatory Support Discussed: No.      - Patient is DNR/DNI Status: No    Use of blood products discussed: No .     Postoperative Care    Pain management: Multi-modal analgesia, Peripheral nerve block (Single Shot), IV analgesics.   PONV prophylaxis: Ondansetron (or other 5HT-3), Dexamethasone or Solumedrol     Comments:                Jose De Los Santos MD

## 2023-02-01 NOTE — ANESTHESIA CARE TRANSFER NOTE
Patient: Dago Dangelo    Procedure: Procedure(s):  EXAM UNDER ANESTHESIA RIGHT ELBOW,  REPAIR LATERAL ULNAR COLLATERAL LIGAMENT       Diagnosis: Closed fracture dislocation of right elbow, initial encounter [S42.401A]  Diagnosis Additional Information: No value filed.    Anesthesia Type:   General     Note:    Oropharynx: oropharynx clear of all foreign objects and spontaneously breathing  Level of Consciousness: drowsy and awake  Oxygen Supplementation: face mask  Level of Supplemental Oxygen (L/min / FiO2): 8  Independent Airway: airway patency satisfactory and stable  Dentition: dentition unchanged  Vital Signs Stable: post-procedure vital signs reviewed and stable  Report to RN Given: handoff report given  Patient transferred to: PACU    Handoff Report: Identifed the Patient, Identified the Reponsible Provider, Reviewed the pertinent medical history, Discussed the surgical course, Reviewed Intra-OP anesthesia mangement and issues during anesthesia, Set expectations for post-procedure period and Allowed opportunity for questions and acknowledgement of understanding      Vitals:  Vitals Value Taken Time   /82 02/01/23 1433   Temp 37.2    Pulse 78 02/01/23 1438   Resp 13 02/01/23 1438   SpO2 99 % 02/01/23 1438   Vitals shown include unvalidated device data.    Electronically Signed By: MAGNUS ENG APRN CRNA  February 1, 2023  2:39 PM

## 2023-02-01 NOTE — ANESTHESIA POSTPROCEDURE EVALUATION
Patient: Dago Dangelo    Procedure: Procedure(s):  EXAM UNDER ANESTHESIA RIGHT ELBOW,  REPAIR LATERAL ULNAR COLLATERAL LIGAMENT       Anesthesia Type:  General    Note:  Disposition: Outpatient   Postop Pain Control: Uneventful            Sign Out: Well controlled pain   PONV: No   Neuro/Psych: Uneventful            Sign Out: Acceptable/Baseline neuro status   Airway/Respiratory: Uneventful            Sign Out: Acceptable/Baseline resp. status   CV/Hemodynamics: Uneventful            Sign Out: Acceptable CV status; No obvious hypovolemia; No obvious fluid overload   Other NRE: NONE   DID A NON-ROUTINE EVENT OCCUR? No           Last vitals:  Vitals Value Taken Time   /85 02/01/23 1445   Temp 37.2  C (99  F) 02/01/23 1433   Pulse 79 02/01/23 1456   Resp 11 02/01/23 1456   SpO2 96 % 02/01/23 1456   Vitals shown include unvalidated device data.    Electronically Signed By: Jose De Los Santos MD  February 1, 2023  2:57 PM

## 2023-02-01 NOTE — DISCHARGE INSTRUCTIONS
Procedure Performed: Repair of right elbow lateral ulnar collateral ligament  Attending Surgeon: Daniel Osman MD  Date: 2/1/2023    DIAGNOSIS  1. Closed fracture dislocation of right elbow, initial encounter        MEDICATIONS   Resume all home medications as directed unless otherwise instructed during this hospitalization. If there is any question, double check with your primary care provider.  Start new discharge medications as directed.    Take 1-2 tablets of extra strength Tylenol 500 mg every 6 hours.    For breakthrough pain use narcotic pain medication as prescribed.    Do not drive or operate machinery while taking narcotic pain medications.   If you are taking other Tylenol containing medicines at home, be sure NOT to exceed 4 gram's (4000 milligrams) of Tylenol per day.   If you are taking pain medications, be sure to take Colace (docusate sodium) as well to prevent constipation. If constipated, try adding another cathartic or enema.  If nausea and vomiting, call the hospital or seek medical attention.    ACTIVITY   Weight bearing: Non-weight bearing to arm.    DIET  Resume same diet prior to your hospital admission.    WOUND   Leave dressing on until you are seen in clinic for your follow up visit.   Watch for signs and symptoms of infection of your wounds including; pain, redness, swelling, drainage or fever.  If you notice any of these symptoms please call or seek medical attention.    Keep wound clean, dry, and intact.  Do not submerge wounds in water until they are healed. No baths, soaking, swimming, or prolonged water exposure for 4 weeks after surgery.    RETURN   Follow-up with Orthopedic Clinic as directed.     Future Appointments   Date Time Provider Department Center   2/9/2023 11:30 AM Mary Xie OT [facility]   3/2/2023  1:00 PM Tl Ortiz MD [facility] GIAN GREGORY       Call the [facility] at [phone] during business hours for any symptoms such as:     * Fevers with Temperature greater than 101.5 degrees.   * Pus drainage from wound site.   * Severe pain, not controlled by medication.   * Persistent nausea, vomiting and inablility to tolerate fluids.    If you are receiving care in [place], you may call the Orthopedic clinic at [phone] Option #2.    FOR URGENT PROBLEMS ONLY, after hours or on weekends call the hospital  at [phone] and ask to speak with the orthopedic resident on call.    You may also be seen at our Orthopedic Walk-In clinic that runs 7 days per week from 7a-5p at [address] [place] [id number]. You can call the Walk-In Clinic at [phone].      Same-Day Surgery   Adult Discharge Orders & Instructions     For 24 hours after surgery:  Get plenty of rest.  A responsible adult must stay with you for at least 24 hours after you leave the hospital.   Pain medication can slow your reflexes. Do not drive or use heavy equipment.  If you have weakness or tingling, don't drive or use heavy equipment until this feeling goes away.  Mixing alcohol and pain medication can cause dizziness and slow your breathing. It can even be fatal. Do not drink alcohol while taking pain medication.  Avoid strenuous or risky activities.  Ask for help when climbing stairs.   You may feel lightheaded.  If so, sit for a few minutes before standing.  Have someone help you get up.   If you have nausea (feel sick to your stomach), drink only clear liquids such as apple juice, ginger ale, broth or 7-Up.  Rest may also help.  Be sure to drink enough fluids.  Move to a regular diet as you feel able. Take pain medications with a small amount of solid food, such as toast or crackers, to avoid nausea.   A slight fever is normal. Call the doctor if your fever is over 100 F (37.7 C) (taken under the tongue) or lasts longer than 24 hours.  You may have a dry mouth, muscle aches, trouble sleeping or a sore throat.  These symptoms should go away after 24  hours.  Do not make important or legal decisions.   Pain Management:      1. Take pain medication (if prescribed) for pain as directed by your physician.        2. WARNING: If the pain medication you have been prescribed contains Tylenol  (acetaminophen), DO NOT take additional doses of Tylenol (acetaminophen).     Call your doctor for any of the followin.  Signs of infection (fever, growing tenderness at the surgery site, severe pain, a large amount of drainage or bleeding, foul-smelling drainage, redness, swelling).    2.  It has been over 8 to 10 hours since surgery and you are still not able to urinate (pee).    3.  Headache for over 24 hours.    4.  Numbness, tingling or weakness the day after surgery (if you had spinal anesthesia).  To contact a doctor, call Dr. Osman's clinic at [phone]  or:  '   [phone] and ask for the Resident On Call for:          Orthopedic (answered 24 hours a day)  '   Emergency Department:  [place] [facility]: [phone]  [place] [facility]: [phone]               Rev. 10/2014    975 mg Tylenol last given at 3:00 pm, next dose at 9:00 pm as needed.

## 2023-02-01 NOTE — ANESTHESIA PROCEDURE NOTES
Airway       Patient location during procedure: OR       Procedure Start/Stop Times: 2/1/2023 12:45 PM  Staff -        CRNA: Sary Parker APRN CRNA       Performed By: CRNA  Consent for Airway        Urgency: elective  Indications and Patient Condition       Indications for airway management: anisha-procedural       Induction type:intravenous       Mask difficulty assessment: 1 - vent by mask    Final Airway Details       Final airway type: supraglottic airway    Supraglottic Airway Details        Type: LMA       Brand: Air-Q       LMA size: 4.5    Post intubation assessment        Placement verified by: capnometry, equal breath sounds and chest rise        Number of attempts at approach: 1       Secured with: silk tape       Ease of procedure: easy       Dentition: Intact and Unchanged    Medication(s) Administered   Medication Administration Time: 2/1/2023 12:45 PM

## 2023-02-02 ASSESSMENT — ACTIVITIES OF DAILY LIVING (ADL)
ADLS_ACUITY_SCORE: 35

## 2023-02-03 ENCOUNTER — ANESTHESIA (OUTPATIENT)
Dept: SURGERY | Facility: CLINIC | Age: 63
End: 2023-02-03
Payer: COMMERCIAL

## 2023-02-03 ASSESSMENT — ACTIVITIES OF DAILY LIVING (ADL)
ADLS_ACUITY_SCORE: 35

## 2023-02-04 ASSESSMENT — ACTIVITIES OF DAILY LIVING (ADL)
ADLS_ACUITY_SCORE: 35

## 2023-02-05 ASSESSMENT — ACTIVITIES OF DAILY LIVING (ADL)
ADLS_ACUITY_SCORE: 35

## 2023-02-06 NOTE — OP NOTE
Patient: Dago Dangelo  : 1960  MRN: [id number]    DATE OF OPERATION: 2023      OPERATIVE REPORT       PREOPERATIVE DIAGNOSIS:  Right elbow fracture dislocation     POSTOPERATIVE DIAGNOSIS:  Right elbow fracture dislocation     PROCEDURE:  1) Repair of right elbow lateral ulnar collateral ligament with internal brace supplementation  2) Exam under anesthesia right elbow    SURGEON:  Daniel Osman MD     ASSISTANT(S):  LEE Aparicio's assistance was required as there were no qualified residents available.    ANESTHESIA:  General     IMPLANTS:   Arthrex 4.75mm and 3.5mm SwiveLock anchors with Internal Brace    ESTIMATED BLOOD LOSS:  5cc    DVT PROPHYLAXIS:  SCDs    TOURNIQUET TIME:  60 minutes    SPECIMENS REMOVED:  None    INTRAOPERATIVE FINDINGS:  Positive pivot shift test. No ulnohumeral sag sign in supination and full extension either before or after repair of LUCL. Elbow remained concentric on lateral in supination/extension, supination/pronation, flexion/supination, and flexion/pronation. Complete avulsion of the lateral ulnar collateral ligament off its origin on the lateral epicondyle. Stable elbow after repair.    COMPLICATIONS:  None    DISPOSITION:  Stable to PACU.     INDICATIONS:  The patient is a 62 year old male who sustained a right elbow dislocation with coronoid tip fracture 2023 after a fall on ice.  The patient underwent closed reduction and splinting in the emergency department.  He presented to the clinic.  Given the pain and swelling, he was not able to tolerate a full exam.  However given the injury pattern and concern for ligamentous injury and future instability, I recommended exam under anesthesia and possible repair of damaged structures.    The indications for surgery were discussed with the patient. The benefits, risks, and alternatives of operative management were discussed in detail with the patient. The patient understands that the risks of  surgery include, but are not limited to: infection, bleeding, injury to nearby structures (such as nerves, blood vessels, and tendons), nonunion, malunion, hardware failure/irritation or breakage, persistent instability, need for additional surgery, pain, stiffness, scarring, need for rehabilitation, and anesthetic complications.  Patient expressed understanding and elected to proceed with surgery. All questions were answered to the patient's satisfaction.     Consent was obtained for the procedure.     DESCRIPTION OF PROCEDURE IN DETAIL:  The patient was seen in the preoperative care unit. Patient identity, consent, procedure to be performed, and operative site were verified with the patient. The right upper extremity was marked.     The patient was brought to the operating room and placed supine on the operating room table. The right upper extremity was placed on an armboard. SCDs were placed on the bilateral lower extremities. General endotracheal anesthesia was induced.    The elbow was first examined under anesthesia. A positive pivot shift was noted with subluxation of the radial head. A hanging arm test was performed with elbow in full extension and supination. Lateral x-ray demonstrated a concentric ulnohumeral joint without sag. The ulnohumeral joint was noted to be fully reduced throughout the arc of motion, including extension supination, extension pronation, flexion pronation, and flexion and supination. As such, the coronoid tip fragment did not need to be repaired.     A well-padded tourniquet was placed on the upper arm. The left upper extremity was prepped and draped in the usual sterile fashion. A timeout was performed confirming the correct patient, procedure, operative site, and antibiotics. All were in agreement.     The limb was exsanguinated and the tourniquet inflated 250 mmHg.  An oblique incision was made over the lateral elbow and proximal to lateral epicondyle to the supinator crest.   Blunt dissection was carried down through the subcutaneous tissues to the extensor musculature fascia.  Kocher's interval was developed between the ECU and anconeus.  This brought us directly down to the joint capsule. Limited elevation of the common extensor origin off the supracondylar ridge was performed for increased visualization. A traumatic rent was noted proximally from where the lateral ulnar collateral ligament had torn from the lateral epicondyle.  This was sharply cleaned of callus and hematoma using a curette.  The lateral ulnar collateral ligament was then mobilized maintaining its full-thickness.  The isometric point on the lateral epicondyle was identified and the guidewire for the anchor was drilled and confirmed to be in appropriate position on fluoroscopy.  The 4.75 mm swivel lock anchor with internal brace and fiberwire sutures was then drilled, tapped, and placed with excellent fixation. The fiberwire sutures were then passed in running locking fashion in the LUCL, which was then repaired primarily back to its origin. This provided an excellent and stout repair. The internal brace suture tapes were then passed through the eyelet of the 3.5mm anchor, which was drilled, tapped, and placed at the insertion of the LUCL on the supinator crest, with the internal brace lying on top of the LUCL complex for added supplementation. The sutures were then trimmed.     The elbow was brought through a range of motion and was noted to be full. No further radial head subluxation was noted. The elbow was examined again under fluoroscopy and no ulnohumeral sag sign was noted on the lateral. The elbow was stable throughout the arc of motion under live fluoroscopy and in all positions as mentioned previously.    The fascia over Kocher's interval was closed with 0-vicryl. The tourniquet was deflated and hemostasis assured. The deep dermal tissues were then closed with interrupted buried 3-0 Vicryl sutures.  The skin  was then closed with a running 4-0 Monocryl subcuticular suture.  Steri-Strips were placed.  20 cc of 0.25% bupivacaine was infiltrated into the skin and subcutaneous tissues at the surgical site.  A sterile dressing was placed.  The arm was then placed in a bulky well-padded long-arm posterior slab splint.    All needle and sponge counts were correct at the end of the procedure. There were no complications.     The patient was awoken from anesthesia and taken to the postoperative recovery unit in stable condition.     I was present and scrubbed for the entire procedure.     POSTOPERATIVE PLAN:  The patient will be discharged home.  He will be strict nonweightbearing.  He will return in 1 week at which time x-rays of the right elbow will be needed out of plaster.  He will need to see hand therapy on the same day for custom thermoplastic posterior long-arm splint.  He will then begin elbow range of motion per the terrible triad protocol.      Daniel Osman MD     Hand, Upper Extremity & Microvascular Surgery  Department of Orthopedic Surgery  [facility]

## 2023-02-06 NOTE — PROGRESS NOTES
Orthopaedic Surgery Hand Clinic Progress Note    Patient Name: Dago Dangelo  MRN: [id number]  : 1960  Date: 2023    Date of Surgery: 23    Surgery Performed: 1) Repair of right elbow lateral ulnar collateral ligament with internal brace supplementation  2) Exam under anesthesia right elbow    Subjective:  Mr. Dago Dangelo is a 62 year old male who returns s/p above procedure. He states he had a good amount of pain the first night following his procedure but the pain has gradually improved. He is taking Tylenol as needed. He has remained in plaster and is utilizing sling for comfort. No numbness/tingling. Admits he has not been able to quit smoking and is still at about a pack per day.    Objective:  There were no vitals taken for this visit.    General: alert, appropriate, NAD  HEENT: NC/AT  CV: RRR by pulse  Pulm: Unlabored on RA  MSK:  Incision c/d/i  No erythema, drainage, evidence of infection  Expected postsurgical and immobilization stiffness of the elbow  Range of motion deferred at this time  Intact wrist lection extension, intact EPL, FPL, and intrinsics  Sensation intact to light touch median, radial, ulnar nerve distributions  Perfused, capillary refill less than 2 seconds    Imaging:  X-rays today reviewed by me demonstrates reduced and concentric ulnohumeral joint.  Symmetric joint space radiocapitellar joint    Assessment/Plan:  62-year-old male status post repair of right elbow lateral ulnar collateral ligament 2023    Ok to shower at this time. Non-weight bearing x 6 weeks.    Patient to see hand therapy today to begin elbow ROM per protocol as below:    [facility] PHYSICAL THERAPY PROTOCOL    Daniel Osman MD  Orthopaedic Surgery  [facility]    Duration: 2x per week for 6 weeks    0-7 Days post op:    PRECAUTIONS   Non weight bearing  Avoid varus strain by limiting shoulder abduction and internal rotation (no  chicken wing   position)    EXERCISES  Active/passive digit movement  Straight plane shoulder flexion (avoid shoulder abduction and IR)    SPLINTING    Remain in post-op splint, typically in elbow flexion with neutral to pronated forearm position    Clinical Reasoning --Elbow flexion approximates the radial head against the coronoid increasing elbow stability. Forearm pronation decreases the stress on the lateral ligament. Shoulder abduction and internal rotation places a varus strain on the lateral ligament    Weeks 1-6 post op:   PRECAUTIONS  Non weight bearing  Avoid varus strain by limiting shoulder abduction and internal rotation (no  chicken wing  position)  Avoid supination/extension/valgus (like pushing off from a chair) as this stresses the lateral structures.     EXERCISES   All exercises performed from supine with shoulder flexion to 90  (unless proximal shoulder injury present)  Forearm pronation/supination performed in 90 elbow flexion  No elbow extension beyond 30  (2 weeks). Performed from supine with shoulder flexion to 90  and forearm pronation. Gradual extension in pronation  Gentle active assisted pronation/supination (elbow must be in at least 90  of flexion)  Gentle active assisted elbow flexion (to full range) and extension to 30 , forearm must be in neutral to pronation    SPLINTING   Custom thermoplast orthosis  Elbow flexion to at least 90   Forearm in neutral to pronation  Wear at all times including while sleeping. May take off for exercises and hygiene.     Clinical Reasoning -- Overhead position decreases the effects of gravity and allows the triceps to function as an elbow stabilizer. Elbow flexion approximates the radial head against the coronoid increasing elbow stability. Forearm pronation decreases the stress on the lateral ligaments. Terminal extension with supination is an unstable position for the elbow      ** For patients with ipsilateral shoulder injuries preventing shoulder flexion --  modified position is seated with elbow at side and flexed to 90 with palm pronated.**    Follow-up 3/14 for ROM check. No x-rays needed.    Counseled patient again on the importance of smoking cessation to optimize ligament healing.    Daniel Osman MD    Hand, Upper Extremity & Microvascular Surgery  Department of Orthopedic Surgery  [facility]

## 2023-02-07 ENCOUNTER — ANCILLARY PROCEDURE (OUTPATIENT)
Dept: GENERAL RADIOLOGY | Facility: CLINIC | Age: 63
End: 2023-02-07
Attending: STUDENT IN AN ORGANIZED HEALTH CARE EDUCATION/TRAINING PROGRAM
Payer: COMMERCIAL

## 2023-02-07 ENCOUNTER — THERAPY VISIT (OUTPATIENT)
Dept: OCCUPATIONAL THERAPY | Facility: CLINIC | Age: 63
End: 2023-02-07
Payer: COMMERCIAL

## 2023-02-07 ENCOUNTER — OFFICE VISIT (OUTPATIENT)
Dept: ORTHOPEDICS | Facility: CLINIC | Age: 63
End: 2023-02-07
Payer: COMMERCIAL

## 2023-02-07 DIAGNOSIS — Z47.89 AFTERCARE FOLLOWING SURGERY OF THE MUSCULOSKELETAL SYSTEM: ICD-10-CM

## 2023-02-07 DIAGNOSIS — Z47.89 ORTHOPEDIC AFTERCARE: ICD-10-CM

## 2023-02-07 DIAGNOSIS — S42.401A CLOSED FRACTURE DISLOCATION OF RIGHT ELBOW, INITIAL ENCOUNTER: Primary | ICD-10-CM

## 2023-02-07 DIAGNOSIS — Z47.89 ORTHOPEDIC AFTERCARE: Primary | ICD-10-CM

## 2023-02-07 DIAGNOSIS — M25.521 ELBOW PAIN, RIGHT: ICD-10-CM

## 2023-02-07 PROCEDURE — 99024 POSTOP FOLLOW-UP VISIT: CPT | Performed by: STUDENT IN AN ORGANIZED HEALTH CARE EDUCATION/TRAINING PROGRAM

## 2023-02-07 PROCEDURE — 97166 OT EVAL MOD COMPLEX 45 MIN: CPT | Mod: GO | Performed by: OCCUPATIONAL THERAPIST

## 2023-02-07 PROCEDURE — 97760 ORTHOTIC MGMT&TRAING 1ST ENC: CPT | Mod: GO | Performed by: OCCUPATIONAL THERAPIST

## 2023-02-07 PROCEDURE — 97110 THERAPEUTIC EXERCISES: CPT | Mod: GO | Performed by: OCCUPATIONAL THERAPIST

## 2023-02-07 PROCEDURE — 73080 X-RAY EXAM OF ELBOW: CPT | Mod: TC | Performed by: RADIOLOGY

## 2023-02-07 NOTE — LETTER
2023         RE: Dago Dangelo  [address]  [place] [address]        Dear Colleague,    Thank you for referring your patient, Dago Dangelo, to the [facility] [place]. Please see a copy of my visit note below.    Orthopaedic Surgery Hand Clinic Progress Note    Patient Name: Dago Dangelo  MRN: [id number]  : 1960  Date: 2023    Date of Surgery: 23    Surgery Performed: 1) Repair of right elbow lateral ulnar collateral ligament with internal brace supplementation  2) Exam under anesthesia right elbow    Subjective:  Mr. Dago Dangelo is a 62 year old male who returns s/p above procedure. He states he had a good amount of pain the first night following his procedure but the pain has gradually improved. He is taking Tylenol as needed. He has remained in plaster and is utilizing sling for comfort. No numbness/tingling. Admits he has not been able to quit smoking and is still at about a pack per day.    Objective:  There were no vitals taken for this visit.    General: alert, appropriate, NAD  HEENT: NC/AT  CV: RRR by pulse  Pulm: Unlabored on RA  MSK:  Incision c/d/i  No erythema, drainage, evidence of infection  Expected postsurgical and immobilization stiffness of the elbow  Range of motion deferred at this time  Intact wrist lection extension, intact EPL, FPL, and intrinsics  Sensation intact to light touch median, radial, ulnar nerve distributions  Perfused, capillary refill less than 2 seconds    Imaging:  X-rays today reviewed by me demonstrates reduced and concentric ulnohumeral joint.  Symmetric joint space radiocapitellar joint    Assessment/Plan:  62-year-old male status post repair of right elbow lateral ulnar collateral ligament 2023    Ok to shower at this time. Non-weight bearing x 6 weeks.    Patient to see hand therapy today to begin elbow ROM per protocol as below:    [facility] PHYSICAL THERAPY  PROTOCOL    Daniel Osman MD  Orthopaedic Surgery  [facility]    Duration: 2x per week for 6 weeks    0-7 Days post op:    PRECAUTIONS   Non weight bearing  Avoid varus strain by limiting shoulder abduction and internal rotation (no  chicken wing  position)    EXERCISES  Active/passive digit movement  Straight plane shoulder flexion (avoid shoulder abduction and IR)    SPLINTING    Remain in post-op splint, typically in elbow flexion with neutral to pronated forearm position    Clinical Reasoning --Elbow flexion approximates the radial head against the coronoid increasing elbow stability. Forearm pronation decreases the stress on the lateral ligament. Shoulder abduction and internal rotation places a varus strain on the lateral ligament    Weeks 1-6 post op:   PRECAUTIONS  Non weight bearing  Avoid varus strain by limiting shoulder abduction and internal rotation (no  chicken wing  position)  Avoid supination/extension/valgus (like pushing off from a chair) as this stresses the lateral structures.     EXERCISES   All exercises performed from supine with shoulder flexion to 90  (unless proximal shoulder injury present)  Forearm pronation/supination performed in 90 elbow flexion  No elbow extension beyond 30  (2 weeks). Performed from supine with shoulder flexion to 90  and forearm pronation. Gradual extension in pronation  Gentle active assisted pronation/supination (elbow must be in at least 90  of flexion)  Gentle active assisted elbow flexion (to full range) and extension to 30 , forearm must be in neutral to pronation    SPLINTING   Custom thermoplast orthosis  Elbow flexion to at least 90   Forearm in neutral to pronation  Wear at all times including while sleeping. May take off for exercises and hygiene.     Clinical Reasoning -- Overhead position decreases the effects of gravity and allows the triceps to function as an elbow stabilizer. Elbow flexion approximates the radial head against the coronoid  increasing elbow stability. Forearm pronation decreases the stress on the lateral ligaments. Terminal extension with supination is an unstable position for the elbow      ** For patients with ipsilateral shoulder injuries preventing shoulder flexion -- modified position is seated with elbow at side and flexed to 90 with palm pronated.**    Follow-up 3/14 for ROM check. No x-rays needed.    Counseled patient again on the importance of smoking cessation to optimize ligament healing.    Daniel Osman MD    Hand, Upper Extremity & Microvascular Surgery  Department of Orthopedic Surgery  [facility]          Again, thank you for allowing me to participate in the care of your patient.        Sincerely,        Daniel Osman MD

## 2023-02-07 NOTE — LETTER
[facility]  [address] [address]  [address]  [facility] [address]  [phone]    2023    Re: Dago Dangelo   :   1960  MRN:  [id number]   REFERRING PHYSICIAN:   No ref. provider found    [facility]  Date of Initial Evaluation:  23  Visits:  Rxs Used: 1  Reason for Referral:     Closed fracture dislocation of right elbow, initial encounter  Elbow pain, right  Aftercare following surgery of the musculoskeletal system    EVALUATION SUMMARY    [facility] Initial Evaluation   Current Date: 2023    Diagnosis: Right elbow fracture dislocation   DOI: 23  DOS: 2023  Procedure: Repair of right elbow lateral ulnar collateral ligament with internal brace supplementation, exam under anesthesia right elbow  Post: 0 w 6 d    Per MRI on :  Impression:  1. Sequelae of recent elbow dislocation:  *  Complete tears of the proximal radial collateral and lateral ulnar  collateral ligaments. High-grade sprain of the olecranon attachment of  the annular ligament.  *  Complete or near complete ulnar collateral ligament tear.  *  Full-thickness, near full width tearing of the proximal common  extensor tendon with intact proximal extensor carpi radialis.  *  Comminuted coronoid process fracture, better detailed on recent CT.  Impaction fractures of the anterior radial head and posterior  capitellum.   2. Diminutive distal biceps tendon.    Subjective:  Patient Health History  Problem began: 2023.   Problem occurred: Fell on ice   Pain is reported as 2/10 on pain scale.  General health as reported by patient is fair.  Re: Dago Dangelo   :   1960    Pertinent medical history includes: diabetes, history of fractures, heart problems, high blood pressure, overweight and smoking.     Medical allergies: none.   Surgeries include:  Heart surgery.    Current  medications:  Cardiac medication, high blood pressure medication and pain medication.    Current occupation is Retired.   Primary job tasks include:  Computer work, driving, lifting/carrying, operating a machine/assembly, pushing/pulling and repetitive tasks.                  Occupational Profile Information:  Right hand dominant  Prior functional level:  no limitations  Patient reports symptoms of pain, stiffness/loss of motion, weakness/loss of strength and edema  Special tests:  x-ray, MRI and CT.    Previous treatment: Not applicable.  Barriers include:none  Mobility: No difficulty  Transportation: drives  Currently Retired  Leisure activities/hobbies: Working with power tools, caring for a 16 month old grand daughter  Other: History of rotator cuff therapy to the right shoulder.     Functional Outcome Measure:   Upper Extremity Functional Index Score:  SCORE:   Column Totals: /80: (P) 27   (A lower score indicates greater disability.)    Objective:  Pain Level (Scale 0-10)   2023   At Rest 0/10   With Use 6-7/10     Pain Description  Date 2023   Location elbow incision, lateral elbow   Pain Quality Dull, occasionally sharp   Frequency intermittent     Pain is worst  daytime   Exacerbated by  Opening coffee can, end range ROM   Relieved by rest   Progression Gradually improving     Re: Dago Dangelo   :   1960    ROM  Pain Report: - none  + mild    ++ moderate    +++ severe   Wrist 2023   AROM Left Right   Extension (GE) 80 34 +   Flexion (GE) 35 40 +   RD NT NT   UD NT NT   Supination NT Contraindicated   Pronation NT Contraindicated     Grossly WNL composite extension is noted to the digits of the right hand, approximately 75% of a composite fist. Opposition of the thumb is WNL. To the left hand, composite flexion/extension of the digits and opposition are WNL.   Edema  Edema (Circumference measured in cm)   2023    R L   Elbow Crease 32 cm 27 cm     Sensation   WNL  throughout all nerve distributions; per patient report    Strength  Contraindicated    Assessment:  Patient presents with symptoms consistent with diagnosis of right elbow  dislocation,  with surgical  intervention.     Patient's limitations or Problem List includes:  Pain, Decreased ROM/motion, Increased edema, Weakness, Sensory disturbance, Hypomobility, Decreased , Decreased pinch, Decreased coordination, Decreased dexterity and Tightness in musculature of the right elbow, wrist, hand, index finger, long finger, ring finger and small finger which interferes with the patient's ability to perform Self Care Tasks (dressing, eating, bathing, hygiene/toileting), Recreational Activities, Household Chores and Driving  as compared to previous level of function.    Rehab Potential:  Good - Return to full activity, some limitations    Patient will benefit from skilled Occupational Therapy to increase ROM, flexibility, motion, overall strength,  strength, pinch strength, forearm strength, stability of wrist, stability of thumb, coordination and dexterity and decrease pain and edema to return to previous activity level and resume normal daily tasks and to reach their rehab potential.     Barriers to Learning:  No barrier    Communication Issues:  Patient appears to be able to clearly communicate and  Re: Dago Dangelo   :   1960     understand verbal and written communication and follow directions correctly.    Chart Review: Chart Review and Expanded review of medical and/or therapy records and additional review of physical, cognitive or psychosocial history related to current functional performance    Identified Performance Deficits: bathing/showering, dressing, feeding, functional mobility, driving and community mobility, health management and maintenance, home establishment and management and leisure activities    Assessment of Occupational Performance:  3-5 Performance Deficits    Clinical Decision  Making (Complexity): Moderate complexity    Treatment Explanation:  The following has been discussed with the patient:  RX ordered/plan of care  Anticipated outcomes  Possible risks and side effects    Plan:  Frequency:  2 X week, once daily  Duration:  for 6 weeks    Treatment Plan:   Modalities:  US  Therapeutic Exercise:  AROM, PROM, Tendon Gliding, Place and Hold and Stabilization  Neuromuscular re-education:  Nerve Gliding, Coordination/Dexterity, Posture and Stabilization  Manual Techniques:  Coordination/Dexterity, Friction massage, Myofascial release and Manual edema mobilization  Orthotic Fabrication:  Static, Static progressive, Forearm based and Long arm  Self Care:  Self Care Tasks and Ergonomic Considerations    Discharge Plan:  Achieve all LTG.  Independent in home treatment program.  Reach maximal therapeutic benefit.    Home Exercise Program:  AROM elbow, wrist, and digits  See HEP above, post-operative protocol below    Next Visit:  Orthosis adjustments PRN  ROM per protocol  MLD, STM    Treatment Protocol    0-7 Days post op:    PRECAUTIONS   Non weight bearing  Avoid varus strain by limiting shoulder abduction and internal rotation (no  chicken  Re: Dago Dangelo   :   1960     wing  position)     EXERCISES  Active/passive digit movement  Straight plane shoulder flexion (avoid shoulder abduction and IR)     SPLINTING    Remain in post-op splint, typically in elbow flexion with neutral to pronated forearm position     Clinical Reasoning --Elbow flexion approximates the radial head against the coronoid increasing elbow stability. Forearm pronation decreases the stress on the lateral ligament. Shoulder abduction and internal rotation places a varus strain on the lateral ligament     Weeks 1-6 post op:   PRECAUTIONS  Non weight bearing  Avoid varus strain by limiting shoulder abduction and internal rotation (no  chicken wing  position)  Avoid supination/extension/valgus (like pushing off  from a chair) as this stresses the lateral structures.      EXERCISES   All exercises performed from supine with shoulder flexion to 90  (unless proximal shoulder injury present)  Forearm pronation/supination performed in 90 elbow flexion  No elbow extension beyond 30  (2 weeks). Performed from supine with shoulder flexion to 90  and forearm pronation. Gradual extension in pronation  Gentle active assisted pronation/supination (elbow must be in at least 90  of flexion)  Gentle active assisted elbow flexion (to full range) and extension to 30 , forearm must be in neutral to pronation     SPLINTING   Custom thermoplast orthosis  Elbow flexion to at least 90   Forearm in neutral to pronation  Wear at all times including while sleeping. May take off for exercises and hygiene.      Clinical Reasoning -- Overhead position decreases the effects of gravity and allows the triceps to function as an elbow stabilizer. Elbow flexion approximates the radial head against the coronoid increasing elbow stability. Forearm pronation decreases the stress on the lateral ligaments. Terminal extension with supination is an unstable position for the elbow        ** For patients with ipsilateral shoulder injuries preventing shoulder flexion -- modified position is seated with elbow at side and flexed to 90 with palm pronated.**      Thank you for your referral.  Re: Dago Dangelo   :   1960    INQUIRIES  Therapist: DASHA Tai/L, BIRDIE  [facility]  [address] [address] [address]  Phone: [phone]  Fax: [phone]

## 2023-02-07 NOTE — PROGRESS NOTES
[facility]    OUTPATIENT Occupational Therapy ORTHOPEDIC EVALUATION  PLAN OF TREATMENT FOR OUTPATIENT REHABILITATION  (COMPLETE FOR INITIAL CLAIMS ONLY)  Patient's Last Name, First Name, M.I.  YOB: 1960  Dago Dangelo    Provider s Name:  [facility]   Medical Record No.  [id number]   Start of Care Date:  02/07/23   Onset Date:  02/01/2301/19/23   Treatment Diagnosis:  Right elbow fracture, dislocation Medical Diagnosis:     Closed fracture dislocation of right elbow, initial encounter  Elbow pain, right       Goals:     02/07/23 0500   Goal #1   Goal #1 dressing   Previous Performance Level Independent   Current Functional Task Don   Current Performance Level Unable with maintainance of post-operative precautions   STG Target Performance Shirt   STG Target Perform Level Severe difficulty   Due Date  03/07/23   LTG Target Task/Performance Independent   Due date 03/21/23         Therapy Frequency:  2x/week  Predicted Duration of Therapy Intervention:  6 weeks    Mary Xie OT                 I CERTIFY THE NEED FOR THESE SERVICES FURNISHED UNDER        THIS PLAN OF TREATMENT AND WHILE UNDER MY CARE     (Physician attestation of this document indicates review and certification of the therapy plan).                     Certification Date From:  02/07/23   Certification Date To:  03/21/23    Referring Provider:  No ref. provider found    Initial Assessment        See Epic Evaluation SOC Date: 02/07/23

## 2023-02-07 NOTE — PROGRESS NOTES
[facility] Initial Evaluation     Current Date: 2/7/2023    Diagnosis: Right elbow fracture dislocation   DOI: 1/19/23  DOS: 2/1/2023  Procedure: Repair of right elbow lateral ulnar collateral ligament with internal brace supplementation, exam under anesthesia right elbow  Post: 0 w 6 d    Per MRI on 1/31:  Impression:  1. Sequelae of recent elbow dislocation:  *  Complete tears of the proximal radial collateral and lateral ulnar  collateral ligaments. High-grade sprain of the olecranon attachment of  the annular ligament.  *  Complete or near complete ulnar collateral ligament tear.  *  Full-thickness, near full width tearing of the proximal common  extensor tendon with intact proximal extensor carpi radialis.  *  Comminuted coronoid process fracture, better detailed on recent CT.  Impaction fractures of the anterior radial head and posterior  capitellum.   2. Diminutive distal biceps tendon.    Subjective:  Patient Health History    Problem began: 1/19/2023.   Problem occurred: Fell on ice   Pain is reported as 2/10 on pain scale.  General health as reported by patient is fair.  Pertinent medical history includes: diabetes, history of fractures, heart problems, high blood pressure, overweight and smoking.     Medical allergies: none.   Surgeries include:  Heart surgery.    Current medications:  Cardiac medication, high blood pressure medication and pain medication.    Current occupation is Retired.   Primary job tasks include:  Computer work, driving, lifting/carrying, operating a machine/assembly, pushing/pulling and repetitive tasks.                  Occupational Profile Information:  Right hand dominant  Prior functional level:  no limitations  Patient reports symptoms of pain, stiffness/loss of motion, weakness/loss of strength and edema  Special tests:  x-ray, MRI and CT.    Previous treatment: Not applicable.  Barriers include:none  Mobility: No difficulty  Transportation: drives  Currently  Retired  Leisure activities/hobbies: Working with power tools, caring for a 16 month old grand daughter  Other: History of rotator cuff therapy to the right shoulder.     Functional Outcome Measure:   Upper Extremity Functional Index Score:  SCORE:   Column Totals: /80: (P) 27   (A lower score indicates greater disability.)    Objective:  Pain Level (Scale 0-10)   2/7/2023   At Rest 0/10   With Use 6-7/10     Pain Description  Date 2/7/2023   Location elbow incision, lateral elbow   Pain Quality Dull, occasionally sharp   Frequency intermittent     Pain is worst  daytime   Exacerbated by  Opening coffee can, end range ROM   Relieved by rest   Progression Gradually improving       ROM  Pain Report: - none  + mild    ++ moderate    +++ severe   Wrist 2/7/2023 2/7/2023   AROM Left Right   Extension (GE) 80 34 +   Flexion (GE) 35 40 +   RD NT NT   UD NT NT   Supination NT Contraindicated   Pronation NT Contraindicated     Grossly WNL composite extension is noted to the digits of the right hand, approximately 75% of a composite fist. Opposition of the thumb is WNL. To the left hand, composite flexion/extension of the digits and opposition are WNL.   Edema  Edema (Circumference measured in cm)   2/7/2023 2/7/2023    R L   Elbow Crease 32 cm 27 cm     Sensation   WNL throughout all nerve distributions; per patient report    Strength  Contraindicated    Assessment:  Patient presents with symptoms consistent with diagnosis of right elbow  dislocation,  with surgical  intervention.     Patient's limitations or Problem List includes:  Pain, Decreased ROM/motion, Increased edema, Weakness, Sensory disturbance, Hypomobility, Decreased , Decreased pinch, Decreased coordination, Decreased dexterity and Tightness in musculature of the right elbow, wrist, hand, index finger, long finger, ring finger and small finger which interferes with the patient's ability to perform Self Care Tasks (dressing, eating, bathing,  hygiene/toileting), Recreational Activities, Household Chores and Driving  as compared to previous level of function.    Rehab Potential:  Good - Return to full activity, some limitations    Patient will benefit from skilled Occupational Therapy to increase ROM, flexibility, motion, overall strength,  strength, pinch strength, forearm strength, stability of wrist, stability of thumb, coordination and dexterity and decrease pain and edema to return to previous activity level and resume normal daily tasks and to reach their rehab potential.     Barriers to Learning:  No barrier    Communication Issues:  Patient appears to be able to clearly communicate and understand verbal and written communication and follow directions correctly.    Chart Review: Chart Review and Expanded review of medical and/or therapy records and additional review of physical, cognitive or psychosocial history related to current functional performance    Identified Performance Deficits: bathing/showering, dressing, feeding, functional mobility, driving and community mobility, health management and maintenance, home establishment and management and leisure activities    Assessment of Occupational Performance:  3-5 Performance Deficits    Clinical Decision Making (Complexity): Moderate complexity    Treatment Explanation:  The following has been discussed with the patient:  RX ordered/plan of care  Anticipated outcomes  Possible risks and side effects    Plan:  Frequency:  2 X week, once daily  Duration:  for 6 weeks    Treatment Plan:   Modalities:  US  Therapeutic Exercise:  AROM, PROM, Tendon Gliding, Place and Hold and Stabilization  Neuromuscular re-education:  Nerve Gliding, Coordination/Dexterity, Posture and Stabilization  Manual Techniques:  Coordination/Dexterity, Friction massage, Myofascial release and Manual edema mobilization  Orthotic Fabrication:  Static, Static progressive, Forearm based and Long arm  Self Care:  Self Care  Tasks and Ergonomic Considerations    Discharge Plan:  Achieve all LTG.  Independent in home treatment program.  Reach maximal therapeutic benefit.    Home Exercise Program:  AROM elbow, wrist, and digits  See HEP above, post-operative protocol below    Next Visit:  Orthosis adjustments PRN  ROM per protocol  JULITA SAMS    Treatment Protocol    0-7 Days post op:    PRECAUTIONS   Non weight bearing  Avoid varus strain by limiting shoulder abduction and internal rotation (no  chicken wing  position)     EXERCISES  Active/passive digit movement  Straight plane shoulder flexion (avoid shoulder abduction and IR)     SPLINTING    Remain in post-op splint, typically in elbow flexion with neutral to pronated forearm position     Clinical Reasoning --Elbow flexion approximates the radial head against the coronoid increasing elbow stability. Forearm pronation decreases the stress on the lateral ligament. Shoulder abduction and internal rotation places a varus strain on the lateral ligament     Weeks 1-6 post op:   PRECAUTIONS  Non weight bearing  Avoid varus strain by limiting shoulder abduction and internal rotation (no  chicken wing  position)  Avoid supination/extension/valgus (like pushing off from a chair) as this stresses the lateral structures.      EXERCISES   All exercises performed from supine with shoulder flexion to 90  (unless proximal shoulder injury present)  Forearm pronation/supination performed in 90 elbow flexion  No elbow extension beyond 30  (2 weeks). Performed from supine with shoulder flexion to 90  and forearm pronation. Gradual extension in pronation  Gentle active assisted pronation/supination (elbow must be in at least 90  of flexion)  Gentle active assisted elbow flexion (to full range) and extension to 30 , forearm must be in neutral to pronation     SPLINTING   Custom thermoplast orthosis  Elbow flexion to at least 90   Forearm in neutral to pronation  Wear at all times including while sleeping.  May take off for exercises and hygiene.      Clinical Reasoning -- Overhead position decreases the effects of gravity and allows the triceps to function as an elbow stabilizer. Elbow flexion approximates the radial head against the coronoid increasing elbow stability. Forearm pronation decreases the stress on the lateral ligaments. Terminal extension with supination is an unstable position for the elbow        ** For patients with ipsilateral shoulder injuries preventing shoulder flexion -- modified position is seated with elbow at side and flexed to 90 with palm pronated.**

## 2023-02-09 ENCOUNTER — THERAPY VISIT (OUTPATIENT)
Dept: OCCUPATIONAL THERAPY | Facility: CLINIC | Age: 63
End: 2023-02-09
Payer: COMMERCIAL

## 2023-02-09 DIAGNOSIS — M25.521 ELBOW PAIN, RIGHT: Primary | ICD-10-CM

## 2023-02-09 DIAGNOSIS — Z47.89 AFTERCARE FOLLOWING SURGERY OF THE MUSCULOSKELETAL SYSTEM: ICD-10-CM

## 2023-02-09 PROCEDURE — 97140 MANUAL THERAPY 1/> REGIONS: CPT | Mod: GO | Performed by: OCCUPATIONAL THERAPIST

## 2023-02-09 PROCEDURE — 97110 THERAPEUTIC EXERCISES: CPT | Mod: GO | Performed by: OCCUPATIONAL THERAPIST

## 2023-02-09 NOTE — PROGRESS NOTES
Hand Therapy Objective note 2/9/23    Current Date: 2/9/2023    Diagnosis: Right elbow fracture dislocation   DOI: 1/19/23  DOS: 2/1/2023  Procedure: Repair of right elbow lateral ulnar collateral ligament with internal brace supplementation, exam under anesthesia right elbow  Post: 1 w 1 d    Per MRI on 1/31:  Impression:  1. Sequelae of recent elbow dislocation:  *  Complete tears of the proximal radial collateral and lateral ulnar  collateral ligaments. High-grade sprain of the olecranon attachment of  the annular ligament.  *  Complete or near complete ulnar collateral ligament tear.  *  Full-thickness, near full width tearing of the proximal common  extensor tendon with intact proximal extensor carpi radialis.  *  Comminuted coronoid process fracture, better detailed on recent CT.  Impaction fractures of the anterior radial head and posterior  capitellum.   2. Diminutive distal biceps tendon.    Subjective:  Patient Health History    Problem began: 1/19/2023.   Problem occurred: Fell on ice   Pain is reported as 2/10 on pain scale.  General health as reported by patient is fair.  Pertinent medical history includes: diabetes, history of fractures, heart problems, high blood pressure, overweight and smoking.     Medical allergies: none.   Surgeries include:  Heart surgery.    Current medications:  Cardiac medication, high blood pressure medication and pain medication.    Current occupation is Retired.   Primary job tasks include:  Computer work, driving, lifting/carrying, operating a machine/assembly, pushing/pulling and repetitive tasks.                  Occupational Profile Information:  Right hand dominant  Prior functional level:  no limitations  Patient reports symptoms of pain, stiffness/loss of motion, weakness/loss of strength and edema  Special tests:  x-ray, MRI and CT.    Previous treatment: Not applicable.  Barriers include:none  Mobility: No difficulty  Transportation: drives  Currently  Retired  Leisure activities/hobbies: Working with power tools, caring for a 16 month old grand daughter  Other: History of rotator cuff therapy to the right shoulder.     Functional Outcome Measure:   Upper Extremity Functional Index Score:  SCORE:   Column Totals: /80: (P) 27   (A lower score indicates greater disability.)    Objective:  Pain Level (Scale 0-10)   2/7/2023   At Rest 0/10   With Use 6-7/10     Pain Description  Date 2/7/2023   Location elbow incision, lateral elbow   Pain Quality Dull, occasionally sharp   Frequency intermittent     Pain is worst  daytime   Exacerbated by  Opening coffee can, end range ROM   Relieved by rest   Progression Gradually improving       ROM  Pain Report: - none  + mild    ++ moderate    +++ severe   Wrist 2/7/2023 2/7/2023 2/9/23   AROM Left Right R   Extension (GE) 80 34 + 47   Flexion (GE) 35 40 + 50   RD NT NT    UD NT NT    Supination NT Contraindicated    Pronation NT Contraindicated      Finger ROM:  Grossly WNL composite extension is noted to the digits of the right hand, approximately 75% of a composite fist. Opposition of the thumb is WNL. To the left hand, composite flexion/extension of the digits and opposition are WNL.     ROM  Pain Report: - none  + mild    ++ moderate    +++ severe   Elbow 2/9/2023 2/9/2023   AROM (PROM) L R   Extension  (contraindicated past 30)  57, in supine   Flexion  100 in supine   Supination  Contraindicated   Pronation 90 64         Edema (Circumference measured in cm)   2/7/2023 2/7/2023    R L   Elbow Crease 32 cm 27 cm     Sensation   WNL throughout all nerve distributions; per patient report    Strength  Contraindicated      Home Exercise Program:  AROM elbow, wrist, and digits  See HEP above, post-operative protocol below    Next Visit:  Orthosis adjustments PRN  ROM per protocol  MLD, STM    Treatment Protocol    0-7 Days post op:    PRECAUTIONS   Non weight bearing  Avoid varus strain by limiting shoulder abduction and internal  rotation (no  chicken wing  position)     EXERCISES  Active/passive digit movement  Straight plane shoulder flexion (avoid shoulder abduction and IR)     SPLINTING    Remain in post-op splint, typically in elbow flexion with neutral to pronated forearm position     Clinical Reasoning --Elbow flexion approximates the radial head against the coronoid increasing elbow stability. Forearm pronation decreases the stress on the lateral ligament. Shoulder abduction and internal rotation places a varus strain on the lateral ligament     Weeks 1-6 post op:   PRECAUTIONS  Non weight bearing  Avoid varus strain by limiting shoulder abduction and internal rotation (no  chicken wing  position)  Avoid supination/extension/valgus (like pushing off from a chair) as this stresses the lateral structures.      EXERCISES   All exercises performed from supine with shoulder flexion to 90  (unless proximal shoulder injury present)  Forearm pronation/supination performed in 90 elbow flexion  No elbow extension beyond 30  (2 weeks). Performed from supine with shoulder flexion to 90  and forearm pronation. Gradual extension in pronation  Gentle active assisted pronation/supination (elbow must be in at least 90  of flexion)  Gentle active assisted elbow flexion (to full range) and extension to 30 , forearm must be in neutral to pronation     SPLINTING   Custom thermoplast orthosis  Elbow flexion to at least 90   Forearm in neutral to pronation  Wear at all times including while sleeping. May take off for exercises and hygiene.      Clinical Reasoning -- Overhead position decreases the effects of gravity and allows the triceps to function as an elbow stabilizer. Elbow flexion approximates the radial head against the coronoid increasing elbow stability. Forearm pronation decreases the stress on the lateral ligaments. Terminal extension with supination is an unstable position for the elbow        ** For patients with ipsilateral shoulder injuries  preventing shoulder flexion -- modified position is seated with elbow at side and flexed to 90 with palm pronated.**

## 2023-02-14 ENCOUNTER — THERAPY VISIT (OUTPATIENT)
Dept: OCCUPATIONAL THERAPY | Facility: CLINIC | Age: 63
End: 2023-02-14
Payer: COMMERCIAL

## 2023-02-14 ENCOUNTER — TELEPHONE (OUTPATIENT)
Dept: ORTHOPEDICS | Facility: CLINIC | Age: 63
End: 2023-02-14

## 2023-02-14 DIAGNOSIS — Z47.89 ORTHOPEDIC AFTERCARE: Primary | ICD-10-CM

## 2023-02-14 DIAGNOSIS — M25.521 ELBOW PAIN, RIGHT: Primary | ICD-10-CM

## 2023-02-14 DIAGNOSIS — Z47.89 AFTERCARE FOLLOWING SURGERY OF THE MUSCULOSKELETAL SYSTEM: ICD-10-CM

## 2023-02-14 PROCEDURE — 97140 MANUAL THERAPY 1/> REGIONS: CPT | Mod: GO | Performed by: OCCUPATIONAL THERAPIST

## 2023-02-14 PROCEDURE — 97110 THERAPEUTIC EXERCISES: CPT | Mod: GO | Performed by: OCCUPATIONAL THERAPIST

## 2023-02-14 PROCEDURE — 97763 ORTHC/PROSTC MGMT SBSQ ENC: CPT | Mod: GO | Performed by: OCCUPATIONAL THERAPIST

## 2023-02-14 NOTE — PROGRESS NOTES
SOAP Note - Hand Therapy    ROM  Pain Report: - none  + mild    ++ moderate    +++ severe   Elbow 2/9/2023 2/9/2023 2/14/23 2/14/23   AROM (PROM) L R L R   Extension  (contraindicated past 30)  57, in supine NT 40, completed in supine, shoulder in 90 degrees flexion and neutral forearm   Flexion  100 in supine NT NT   Supination  Contraindicated 74, elbow at 90 degrees flexion and neutral shoulder rotation 72, elbow at 90 degrees flexion and neutral shoulder rotation   Pronation 90 64 80, elbow at 90 degrees flexion and neutral shoulder rotation 51, elbow at 90 degrees flexion and neutral shoulder rotation     Please refer to the daily flowsheet for treatment today, total treatment time and time spent performing 1:1 timed codes.

## 2023-02-16 ENCOUNTER — THERAPY VISIT (OUTPATIENT)
Dept: OCCUPATIONAL THERAPY | Facility: CLINIC | Age: 63
End: 2023-02-16
Payer: COMMERCIAL

## 2023-02-16 DIAGNOSIS — M25.521 ELBOW PAIN, RIGHT: Primary | ICD-10-CM

## 2023-02-16 DIAGNOSIS — Z47.89 AFTERCARE FOLLOWING SURGERY OF THE MUSCULOSKELETAL SYSTEM: ICD-10-CM

## 2023-02-16 DIAGNOSIS — Z47.89 ORTHOPEDIC AFTERCARE: ICD-10-CM

## 2023-02-16 PROCEDURE — 97110 THERAPEUTIC EXERCISES: CPT | Mod: GO | Performed by: OCCUPATIONAL THERAPIST

## 2023-02-16 PROCEDURE — 97763 ORTHC/PROSTC MGMT SBSQ ENC: CPT | Mod: GO | Performed by: OCCUPATIONAL THERAPIST

## 2023-02-16 PROCEDURE — 97140 MANUAL THERAPY 1/> REGIONS: CPT | Mod: GO | Performed by: OCCUPATIONAL THERAPIST

## 2023-02-21 ENCOUNTER — THERAPY VISIT (OUTPATIENT)
Dept: OCCUPATIONAL THERAPY | Facility: CLINIC | Age: 63
End: 2023-02-21
Payer: COMMERCIAL

## 2023-02-21 DIAGNOSIS — M25.521 ELBOW PAIN, RIGHT: Primary | ICD-10-CM

## 2023-02-21 DIAGNOSIS — Z47.89 AFTERCARE FOLLOWING SURGERY OF THE MUSCULOSKELETAL SYSTEM: ICD-10-CM

## 2023-02-21 PROCEDURE — 97140 MANUAL THERAPY 1/> REGIONS: CPT | Mod: GO | Performed by: OCCUPATIONAL THERAPIST

## 2023-02-21 PROCEDURE — 97110 THERAPEUTIC EXERCISES: CPT | Mod: GO | Performed by: OCCUPATIONAL THERAPIST

## 2023-02-21 NOTE — PROGRESS NOTES
SOAP Note - Hand Therapy    ROM  Pain Report: - none  + mild    ++ moderate    +++ severe   Elbow 2/9/2023 2/9/2023 2/14/23 2/14/23 2/21/23   AROM (PROM) L R L R R   Extension  (contraindicated past 30)  57, in supine NT 40, completed in supine, shoulder in 90 degrees flexion and neutral forearm 35, in supine, neutral forearm   Flexion  100 in supine NT NT 85   Supination  Contraindicated 74, elbow at 90 degrees flexion and neutral shoulder rotation 72, elbow at 90 degrees flexion and neutral shoulder rotation    Pronation 90 64 80, elbow at 90 degrees flexion and neutral shoulder rotation 51, elbow at 90 degrees flexion and neutral shoulder rotation      Please refer to the daily flowsheet for treatment today, total treatment time and time spent performing 1:1 timed codes.

## 2023-02-26 DIAGNOSIS — I21.4 NON-STEMI (NON-ST ELEVATED MYOCARDIAL INFARCTION) (H): ICD-10-CM

## 2023-02-27 RX ORDER — ASPIRIN 81 MG/1
TABLET, COATED ORAL
Qty: 90 TABLET | Refills: 1 | Status: SHIPPED | OUTPATIENT
Start: 2023-02-27 | End: 2023-09-18

## 2023-02-27 NOTE — TELEPHONE ENCOUNTER
Prescription approved per [facility] Refill Protocol.  Ophelia Jackman RN

## 2023-03-02 ENCOUNTER — OFFICE VISIT (OUTPATIENT)
Dept: FAMILY MEDICINE | Facility: CLINIC | Age: 63
End: 2023-03-02
Payer: COMMERCIAL

## 2023-03-02 VITALS
HEIGHT: 65 IN | OXYGEN SATURATION: 98 % | TEMPERATURE: 98.6 F | RESPIRATION RATE: 14 BRPM | HEART RATE: 85 BPM | WEIGHT: 174.5 LBS | SYSTOLIC BLOOD PRESSURE: 122 MMHG | DIASTOLIC BLOOD PRESSURE: 60 MMHG | BODY MASS INDEX: 29.07 KG/M2

## 2023-03-02 DIAGNOSIS — R80.9 TYPE 2 DIABETES MELLITUS WITH MICROALBUMINURIA, WITHOUT LONG-TERM CURRENT USE OF INSULIN (H): ICD-10-CM

## 2023-03-02 DIAGNOSIS — E78.5 HYPERLIPIDEMIA LDL GOAL <70: Primary | ICD-10-CM

## 2023-03-02 DIAGNOSIS — N18.1 CHRONIC KIDNEY DISEASE, STAGE 1: ICD-10-CM

## 2023-03-02 DIAGNOSIS — E78.1 HYPERTRIGLYCERIDEMIA: ICD-10-CM

## 2023-03-02 DIAGNOSIS — F17.200 TOBACCO USE DISORDER: ICD-10-CM

## 2023-03-02 DIAGNOSIS — I10 BENIGN ESSENTIAL HYPERTENSION: ICD-10-CM

## 2023-03-02 DIAGNOSIS — E11.29 TYPE 2 DIABETES MELLITUS WITH MICROALBUMINURIA, WITHOUT LONG-TERM CURRENT USE OF INSULIN (H): ICD-10-CM

## 2023-03-02 DIAGNOSIS — I25.110 CORONARY ARTERY DISEASE INVOLVING NATIVE CORONARY ARTERY OF NATIVE HEART WITH UNSTABLE ANGINA PECTORIS (H): ICD-10-CM

## 2023-03-02 LAB
ANION GAP SERPL CALCULATED.3IONS-SCNC: 16 MMOL/L (ref 7–15)
BUN SERPL-MCNC: 19.7 MG/DL (ref 8–23)
CALCIUM SERPL-MCNC: 10.2 MG/DL (ref 8.8–10.2)
CHLORIDE SERPL-SCNC: 99 MMOL/L (ref 98–107)
CHOLEST SERPL-MCNC: 185 MG/DL
CREAT SERPL-MCNC: 0.77 MG/DL (ref 0.67–1.17)
CREAT UR-MCNC: 21.5 MG/DL
DEPRECATED HCO3 PLAS-SCNC: 22 MMOL/L (ref 22–29)
GFR SERPL CREATININE-BSD FRML MDRD: >90 ML/MIN/1.73M2
GLUCOSE SERPL-MCNC: 122 MG/DL (ref 70–99)
HBA1C MFR BLD: 6.6 % (ref 0–5.6)
HDLC SERPL-MCNC: 33 MG/DL
HOLD SPECIMEN: NORMAL
HOLD SPECIMEN: NORMAL
LDLC SERPL CALC-MCNC: 103 MG/DL
MICROALBUMIN UR-MCNC: 22 MG/L
MICROALBUMIN/CREAT UR: 102.33 MG/G CR (ref 0–17)
NONHDLC SERPL-MCNC: 152 MG/DL
POTASSIUM SERPL-SCNC: 4.1 MMOL/L (ref 3.4–5.3)
SODIUM SERPL-SCNC: 137 MMOL/L (ref 136–145)
TRIGL SERPL-MCNC: 243 MG/DL

## 2023-03-02 PROCEDURE — 82570 ASSAY OF URINE CREATININE: CPT | Performed by: FAMILY MEDICINE

## 2023-03-02 PROCEDURE — 82043 UR ALBUMIN QUANTITATIVE: CPT | Performed by: FAMILY MEDICINE

## 2023-03-02 PROCEDURE — 99214 OFFICE O/P EST MOD 30 MIN: CPT | Performed by: FAMILY MEDICINE

## 2023-03-02 PROCEDURE — 80048 BASIC METABOLIC PNL TOTAL CA: CPT | Performed by: FAMILY MEDICINE

## 2023-03-02 PROCEDURE — 36415 COLL VENOUS BLD VENIPUNCTURE: CPT | Performed by: FAMILY MEDICINE

## 2023-03-02 PROCEDURE — 83036 HEMOGLOBIN GLYCOSYLATED A1C: CPT | Performed by: FAMILY MEDICINE

## 2023-03-02 PROCEDURE — 99207 PR FOOT EXAM NO CHARGE: CPT | Mod: 25 | Performed by: FAMILY MEDICINE

## 2023-03-02 PROCEDURE — 80061 LIPID PANEL: CPT | Performed by: FAMILY MEDICINE

## 2023-03-02 RX ORDER — BUPROPION HYDROCHLORIDE 150 MG/1
150 TABLET, FILM COATED, EXTENDED RELEASE ORAL 2 TIMES DAILY
Qty: 180 TABLET | Refills: 1 | Status: SHIPPED | OUTPATIENT
Start: 2023-03-02 | End: 2023-09-07

## 2023-03-02 RX ORDER — CARVEDILOL 25 MG/1
25 TABLET ORAL 2 TIMES DAILY WITH MEALS
Qty: 180 TABLET | Refills: 0 | Status: SHIPPED | OUTPATIENT
Start: 2023-03-02 | End: 2023-08-14

## 2023-03-02 RX ORDER — GEMFIBROZIL 600 MG/1
600 TABLET, FILM COATED ORAL 2 TIMES DAILY
Qty: 180 TABLET | Refills: 3 | Status: SHIPPED | OUTPATIENT
Start: 2023-03-02 | End: 2024-05-23

## 2023-03-02 RX ORDER — LOSARTAN POTASSIUM 100 MG/1
100 TABLET ORAL DAILY
Qty: 90 TABLET | Refills: 0 | Status: SHIPPED | OUTPATIENT
Start: 2023-03-02 | End: 2023-08-14

## 2023-03-02 RX ORDER — CHLORTHALIDONE 25 MG/1
25 TABLET ORAL EVERY MORNING
Qty: 90 TABLET | Refills: 1 | Status: SHIPPED | OUTPATIENT
Start: 2023-03-02 | End: 2023-09-07

## 2023-03-02 ASSESSMENT — ENCOUNTER SYMPTOMS
CONSTITUTIONAL NEGATIVE: 1
PALPITATIONS: 0
NEUROLOGICAL NEGATIVE: 1
HEADACHES: 0
SHORTNESS OF BREATH: 0

## 2023-03-02 ASSESSMENT — PAIN SCALES - GENERAL: PAINLEVEL: NO PAIN (0)

## 2023-03-02 NOTE — PROGRESS NOTES
Assessment and Plan    (E78.5) Hyperlipidemia LDL goal <70  (primary encounter diagnosis)  Comment: has refills  Plan: Lipid panel reflex to direct LDL Non-fasting            (N18.1) Chronic kidney disease, stage 1  Comment: ,onitoring  Plan: BASIC METABOLIC PANEL, Albumin Random Urine         Quantitative with Creat Ratio            (I25.110) Coronary artery disease involving native coronary artery of native heart with unstable angina pectoris (H)  Comment:   Plan: follows reguarly with cardiology    (E11.29,  R80.9) Type 2 diabetes mellitus with microalbuminuria, without long-term current use of insulin (H)  Comment: A1c stable  Plan: Hemoglobin A1c, FOOT EXAM, metFORMIN         (GLUCOPHAGE) 500 MG tablet            (E78.1) Hypertriglyceridemia  Comment: refill  Plan: gemfibrozil (LOPID) 600 MG tablet            (I10) Benign essential hypertension  Comment: refilling, BP looks good  Plan: chlorthalidone (HYGROTON) 25 MG tablet,         carvedilol (COREG) 25 MG tablet, losartan         (COZAAR) 100 MG tablet            (F17.200) Tobacco use disorder  Comment: also advised to use nicotine replacementOTC prn  Plan: buPROPion (ZYBAN) 150 MG 12 hr tablet              RTC in 6m    Tl Ortiz MD      Kelley De La Cruz is a 62 year old, presenting for the following health issues:  Diabetes, Hypertension, and Lipids      History of Present Illness       Diabetes:   He presents for follow up of diabetes.  He is not checking blood glucose. He has no concerns regarding his diabetes at this time.  He is not experiencing numbness or burning in feet, excessive thirst, blurry vision, weight changes or redness, sores or blisters on feet.         Hyperlipidemia:  He presents for follow up of hyperlipidemia.  He is taking medication to lower cholesterol. He is not having myalgia or other side effects to statin medications.    Hypertension: He presents for follow up of hypertension.  He does not check blood pressure  regularly  "outside of the clinic. Outside blood pressures have been over 140/90. He follows a low salt diet.     Vascular Disease:  He presents for follow up of vascular disease.  He takes daily aspirin.    He eats 0-1 servings of fruits and vegetables daily.He consumes 1 sweetened beverage(s) daily.He exercises with enough effort to increase his heart rate 60 or more minutes per day.  He exercises with enough effort to increase his heart rate 3 or less days per week.   He is taking medications regularly.     Feeling well, no acute concerns today.  Denies CP, palpitations, edema, dyspnea, HA, vision changes.    Asking about opitions to quit smoking.  Has tried patch, hypnosis,     Review of Systems   Constitutional: Negative.    Eyes: Negative for visual disturbance.   Respiratory: Negative for shortness of breath.    Cardiovascular: Negative for chest pain, palpitations and peripheral edema.   Neurological: Negative.  Negative for headaches.            Objective    /60 (BP Location: Right arm, Patient Position: Sitting, Cuff Size: Adult Large)   Pulse 85   Temp 98.6  F (37  C) (Oral)   Resp 14   Ht 1.651 m (5' 5\")   Wt 79.2 kg (174 lb 8 oz)   SpO2 98%   BMI 29.04 kg/m    Body mass index is 29.04 kg/m .  Physical Exam  Vitals and nursing note reviewed.   HENT:      Head: Normocephalic.   Eyes:      Conjunctiva/sclera: Conjunctivae normal.   Cardiovascular:      Rate and Rhythm: Normal rate and regular rhythm.      Pulses:           Dorsalis pedis pulses are 2+ on the right side and 2+ on the left side.      Heart sounds: Normal heart sounds.   Pulmonary:      Effort: Pulmonary effort is normal.      Breath sounds: Normal breath sounds.   Musculoskeletal:        Feet:    Skin:     General: Skin is warm and dry.      Comments: Skin on feet healthy.  No wounds, callous, onychomycosis.   Neurological:      General: No focal deficit present.      Mental Status: He is alert and oriented to person, place, and time.      " Gait: Gait normal.      Deep Tendon Reflexes:      Reflex Scores:       Patellar reflexes are 2+ on the right side and 2+ on the left side.       Achilles reflexes are 2+ on the right side and 2+ on the left side.     Comments: Nl filament and proprioception in feet JOSIE   Psychiatric:         Mood and Affect: Mood normal.         Behavior: Behavior normal.

## 2023-03-03 ENCOUNTER — THERAPY VISIT (OUTPATIENT)
Dept: OCCUPATIONAL THERAPY | Facility: CLINIC | Age: 63
End: 2023-03-03
Payer: COMMERCIAL

## 2023-03-03 DIAGNOSIS — Z47.89 AFTERCARE FOLLOWING SURGERY OF THE MUSCULOSKELETAL SYSTEM: ICD-10-CM

## 2023-03-03 DIAGNOSIS — M25.521 ELBOW PAIN, RIGHT: Primary | ICD-10-CM

## 2023-03-03 PROCEDURE — 97110 THERAPEUTIC EXERCISES: CPT | Mod: GO | Performed by: OCCUPATIONAL THERAPIST

## 2023-03-03 PROCEDURE — 97140 MANUAL THERAPY 1/> REGIONS: CPT | Mod: GO | Performed by: OCCUPATIONAL THERAPIST

## 2023-03-10 ENCOUNTER — THERAPY VISIT (OUTPATIENT)
Dept: OCCUPATIONAL THERAPY | Facility: CLINIC | Age: 63
End: 2023-03-10
Payer: COMMERCIAL

## 2023-03-10 DIAGNOSIS — Z47.89 AFTERCARE FOLLOWING SURGERY OF THE MUSCULOSKELETAL SYSTEM: ICD-10-CM

## 2023-03-10 DIAGNOSIS — M25.521 ELBOW PAIN, RIGHT: Primary | ICD-10-CM

## 2023-03-10 PROCEDURE — 97140 MANUAL THERAPY 1/> REGIONS: CPT | Mod: GO | Performed by: OCCUPATIONAL THERAPIST

## 2023-03-10 PROCEDURE — 97110 THERAPEUTIC EXERCISES: CPT | Mod: GO | Performed by: OCCUPATIONAL THERAPIST

## 2023-03-10 NOTE — PROGRESS NOTES
Hand Therapy Progress Note    Current Date:  3/10/2023    Diagnosis: Right elbow fracture dislocation     DOI: 1/19/23  DOS: 2/1/2023  Procedure: Repair of right elbow lateral ulnar collateral ligament with internal brace supplementation, exam under anesthesia right elbow  Post: 5 w 2 d     Per MRI on 1/31:  Impression:  1. Sequelae of recent elbow dislocation:  *  Complete tears of the proximal radial collateral and lateral ulnar  collateral ligaments. High-grade sprain of the olecranon attachment of  the annular ligament.  *  Complete or near complete ulnar collateral ligament tear.  *  Full-thickness, near full width tearing of the proximal common  extensor tendon with intact proximal extensor carpi radialis.  *  Comminuted coronoid process fracture, better detailed on recent CT.  Impaction fractures of the anterior radial head and posterior  capitellum.   2. Diminutive distal biceps tendon.    Reporting period is 2/7 to 3/10/2023    Subjective:   Subjective changes noted by patient:  I still can't quite comb my hair or shave, it's not quite there yet, but my ROM every does feel like it is steadily getting a little better.   Functional changes noted by patient:  Improvement in Self Care Tasks (dressing), Sleep Patterns and Household Chores  Patient has noted adverse reaction to:  None    Objective:  Functional Outcome Measure:   Upper Extremity Functional Index Score:  SCORE:   Column Totals: /80: (P) 27 (3/10/2023)  (A lower score indicates greater disability.)    Objective:  Pain Level (Scale 0-10)   2/7/2023 3/10/2023   At Rest 0/10 0/10   With Use 6-7/10 1-2/10     Pain Description  Date 2/7/2023 3/10/23   Location elbow incision, lateral elbow Hand, lateral elbow   Pain Quality Dull, occasionally sharp Tightness   Frequency intermittent   Intermittent   Pain is worst  daytime Daytime   Exacerbated by  Opening coffee can, end range ROM Stretch, functional activity     Relieved by rest Tolerable stretch, rest    Progression Gradually improving Gradually improving       ROM  Pain Report: - none  + mild    ++ moderate    +++ severe   Wrist 2/7/2023 2/7/2023 2/9/23 3/10/23 3/10/23   AROM Left Right R L R   Extension (GE) 80 34 + 47  56   Flexion (GE) 35 40 + 50 66 56   RD NT NT  NT NT   UD NT NT  NT NT   Supination NT Contraindicated      Pronation NT Contraindicated        Finger ROM:  Grossly WNL composite extension is noted to the digits of the right hand, approximately 75% of a composite fist. Opposition of the thumb is WNL. To the left hand, composite flexion/extension of the digits and opposition are WNL.     3/10/23: Grossly WNL composite extension is noted to the digits of the right hand, approximately 80-90% of a composite fist, slight DIP flexion lags. Opposition of the thumb is WNL. To the left hand, composite flexion/extension of the digits and opposition are WNL.     ROM  Pain Report: - none  + mild    ++ moderate    +++ severe   Elbow 2/9/2023 2/9/2023 3/10/23   AROM (PROM) L R R   Extension  (contraindicated past 30)  57, in supine 37   Flexion  100 in supine 92   Supination  Contraindicated 66   Pronation 90 64 64     *Assess in supine, shoulder in 90 degrees flexion     Edema (Circumference measured in cm)   2/7/2023 2/7/2023 3/10/23    R L R   Elbow Crease 32 cm 27 cm 29.7 cm     Sensation   WNL throughout all nerve distributions; per patient report    Strength  Contraindicated      Home Exercise Program:  AROM elbow, wrist, and digits  See HEP above, post-operative protocol below    Next Visit:  Orthosis adjustments PRN  ROM per protocol  MLD, STM    Treatment Protocol    0-7 Days post op:    PRECAUTIONS   Non weight bearing  Avoid varus strain by limiting shoulder abduction and internal rotation (no  chicken wing  position)     EXERCISES  Active/passive digit movement  Straight plane shoulder flexion (avoid shoulder abduction and IR)     SPLINTING    Remain in post-op splint, typically in elbow flexion  with neutral to pronated forearm position     Clinical Reasoning --Elbow flexion approximates the radial head against the coronoid increasing elbow stability. Forearm pronation decreases the stress on the lateral ligament. Shoulder abduction and internal rotation places a varus strain on the lateral ligament     Weeks 1-6 post op:   PRECAUTIONS  Non weight bearing  Avoid varus strain by limiting shoulder abduction and internal rotation (no  chicken wing  position)  Avoid supination/extension/valgus (like pushing off from a chair) as this stresses the lateral structures.      EXERCISES   All exercises performed from supine with shoulder flexion to 90  (unless proximal shoulder injury present)  Forearm pronation/supination performed in 90 elbow flexion  No elbow extension beyond 30  (2 weeks). Performed from supine with shoulder flexion to 90  and forearm pronation. Gradual extension in pronation  Gentle active assisted pronation/supination (elbow must be in at least 90  of flexion)  Gentle active assisted elbow flexion (to full range) and extension to 30 , forearm must be in neutral to pronation     SPLINTING   Custom thermoplast orthosis  Elbow flexion to at least 90   Forearm in neutral to pronation  Wear at all times including while sleeping. May take off for exercises and hygiene.      Clinical Reasoning -- Overhead position decreases the effects of gravity and allows the triceps to function as an elbow stabilizer. Elbow flexion approximates the radial head against the coronoid increasing elbow stability. Forearm pronation decreases the stress on the lateral ligaments. Terminal extension with supination is an unstable position for the elbow        ** For patients with ipsilateral shoulder injuries preventing shoulder flexion -- modified position is seated with elbow at side and flexed to 90 with palm pronated.**    Please refer to the daily flowsheet for treatment provided today.     Assessment:  Response to  therapy has been improvement to:  ROM of Elbow:  Flexion, extension  Wrist:  Flexion, extension  Fingers: All Planes  Edema:  less brawny edema at elbow crease  Pain:  frequency is less, intensity of pain is decreased, duration of pain is decreased and less tender over affected area  Self Care Skills:  Improved ease and independence with UE dressing    Overall Assessment:  Patient's symptoms are resolving.  Patient's treatment restrictions have been changed.  STG/LTG:  STGoals have been reviewed and progress or achievement has occurred;  see goal sheet for details and updates.    Plan:  Frequency/Duration:  Recommend continuing with the current treatment plan. 1 X week, once daily through existing cert period, will re-assess at that time and adjust frequency/duration as protocol progresses.   Appropriateness of Rx I have re-evaluated this patient and find that the nature, scope, duration and intensity of the therapy is appropriate for the medical condition of the patient.  Recommendations for Continued Therapy    Home Exercise Program:  AROM elbow, wrist, and digits  See HEP above, post-operative protocol below    Next Visit:  Adjust HEP, treatment plan per MD protocol

## 2023-03-14 ENCOUNTER — OFFICE VISIT (OUTPATIENT)
Dept: ORTHOPEDICS | Facility: CLINIC | Age: 63
End: 2023-03-14
Payer: COMMERCIAL

## 2023-03-14 VITALS
HEIGHT: 65 IN | DIASTOLIC BLOOD PRESSURE: 63 MMHG | SYSTOLIC BLOOD PRESSURE: 120 MMHG | WEIGHT: 174.6 LBS | BODY MASS INDEX: 29.09 KG/M2

## 2023-03-14 DIAGNOSIS — S42.401D CLOSED FRACTURE DISLOCATION OF RIGHT ELBOW WITH ROUTINE HEALING, SUBSEQUENT ENCOUNTER: ICD-10-CM

## 2023-03-14 DIAGNOSIS — S53.114A ANTERIOR DISLOCATION OF RIGHT ELBOW, INITIAL ENCOUNTER: Primary | ICD-10-CM

## 2023-03-14 PROBLEM — Z00.00 ENCOUNTER FOR PREVENTIVE HEALTH EXAMINATION: Status: ACTIVE | Noted: 2023-03-14

## 2023-03-14 PROCEDURE — 99024 POSTOP FOLLOW-UP VISIT: CPT | Performed by: STUDENT IN AN ORGANIZED HEALTH CARE EDUCATION/TRAINING PROGRAM

## 2023-03-14 NOTE — PROGRESS NOTES
Orthopaedic Surgery Hand Clinic Progress Note    Patient Name: Dago Dangelo  MRN: [id number]  : 1960  Date: Mar 14, 2023    Date of Surgery: 23    Surgery Performed: 1) Repair of right elbow lateral ulnar collateral ligament with internal brace supplementation  2) Exam under anesthesia right elbow    Subjective:    3/14/23: Patient was last seen 23. Patient reports stiffness and decreased ROM. Particularly with flexion and extension. He has been attending OT, working on HEP, and wearing custom elbow splint. No pain.      Objective:  There were no vitals taken for this visit.    General: alert, appropriate, NAD  HEENT: NC/AT  CV: RRR by pulse  Pulm: Unlabored on RA  MSK:  Incision c/d/i  No erythema, drainage, evidence of infection  Elbow ROM -30 to 90, full pronation, supination  Negative pivot shift  Intact wrist flexion and extension, intact EPL, FPL, and intrinsics  Sensation intact to light touch median, radial, ulnar nerve distributions  Perfused, capillary refill less than 2 seconds    Imaging:  X-rays from  reviewed by me demonstrates reduced and concentric ulnohumeral joint.  Symmetric joint space radiocapitellar joint    Assessment/Plan:  62-year-old male status post repair of right elbow lateral ulnar collateral ligament 2023    Begin active and passive ROM as well as progressive weight bearing and strengthening as tolerated.    Consider static progressive splints at night to maintain corrections achieved with therapy. May consider MOIRA splint if necessary.    Follow-up 3 months for repeat evaluation. X-rays of right elbow needed at that time.    Daniel Osman MD    Hand, Upper Extremity & Microvascular Surgery  Department of Orthopedic Surgery  [facility]

## 2023-03-14 NOTE — LETTER
3/14/2023         RE: Dago Dangelo  [address]  [place] [address]        Dear Colleague,    Thank you for referring your patient, Dago Dangelo, to the [facility] [place]. Please see a copy of my visit note below.    Orthopaedic Surgery Hand Clinic Progress Note    Patient Name: Dago Dangelo  MRN: [id number]  : 1960  Date: Mar 14, 2023    Date of Surgery: 23    Surgery Performed: 1) Repair of right elbow lateral ulnar collateral ligament with internal brace supplementation  2) Exam under anesthesia right elbow    Subjective:    3/14/23: Patient was last seen 23. Patient reports stiffness and decreased ROM. Particularly with flexion and extension. He has been attending OT, working on HEP, and wearing custom elbow splint. No pain.      Objective:  There were no vitals taken for this visit.    General: alert, appropriate, NAD  HEENT: NC/AT  CV: RRR by pulse  Pulm: Unlabored on RA  MSK:  Incision c/d/i  No erythema, drainage, evidence of infection  Elbow ROM -30 to 90, full pronation, supination  Negative pivot shift  Intact wrist flexion and extension, intact EPL, FPL, and intrinsics  Sensation intact to light touch median, radial, ulnar nerve distributions  Perfused, capillary refill less than 2 seconds    Imaging:  X-rays from  reviewed by me demonstrates reduced and concentric ulnohumeral joint.  Symmetric joint space radiocapitellar joint    Assessment/Plan:  62-year-old male status post repair of right elbow lateral ulnar collateral ligament 2023    Begin active and passive ROM as well as progressive weight bearing and strengthening as tolerated.    Consider static progressive splints at night to maintain corrections achieved with therapy. May consider MOIRA splint if necessary.    Follow-up 3 months for repeat evaluation. X-rays of right elbow needed at that time.    Daniel Osman MD    Hand, Upper Extremity &  Microvascular Surgery  Department of Orthopedic Surgery  [facility]             Again, thank you for allowing me to participate in the care of your patient.        Sincerely,        Daniel Osman MD

## 2023-03-15 ENCOUNTER — APPOINTMENT (OUTPATIENT)
Dept: PEDIATRIC ORTHOPEDIC SURGERY | Facility: CLINIC | Age: 63
End: 2023-03-15

## 2023-03-17 ENCOUNTER — THERAPY VISIT (OUTPATIENT)
Dept: OCCUPATIONAL THERAPY | Facility: CLINIC | Age: 63
End: 2023-03-17
Payer: COMMERCIAL

## 2023-03-17 DIAGNOSIS — Z47.89 AFTERCARE FOLLOWING SURGERY OF THE MUSCULOSKELETAL SYSTEM: ICD-10-CM

## 2023-03-17 DIAGNOSIS — M25.521 ELBOW PAIN, RIGHT: Primary | ICD-10-CM

## 2023-03-17 PROCEDURE — 97110 THERAPEUTIC EXERCISES: CPT | Mod: GO | Performed by: OCCUPATIONAL THERAPIST

## 2023-03-22 ENCOUNTER — THERAPY VISIT (OUTPATIENT)
Dept: OCCUPATIONAL THERAPY | Facility: CLINIC | Age: 63
End: 2023-03-22
Payer: COMMERCIAL

## 2023-03-22 DIAGNOSIS — Z47.89 AFTERCARE FOLLOWING SURGERY OF THE MUSCULOSKELETAL SYSTEM: ICD-10-CM

## 2023-03-22 DIAGNOSIS — M25.521 ELBOW PAIN, RIGHT: Primary | ICD-10-CM

## 2023-03-22 PROCEDURE — 97110 THERAPEUTIC EXERCISES: CPT | Mod: GO | Performed by: OCCUPATIONAL THERAPIST

## 2023-03-22 PROCEDURE — 97140 MANUAL THERAPY 1/> REGIONS: CPT | Mod: GO | Performed by: OCCUPATIONAL THERAPIST

## 2023-03-22 PROCEDURE — 97535 SELF CARE MNGMENT TRAINING: CPT | Mod: GO | Performed by: OCCUPATIONAL THERAPIST

## 2023-03-22 NOTE — LETTER
[facility]  [address]  [address]  [place] [address]  [phone]    2023    Re: Dago FENG Dangelo   :   1960  MRN:  [id number]   REFERRING PHYSICIAN:   Daniel RAMÍREZ [facility]  Date of Initial Evaluation:  23  Visits:  Rxs Used: 9  Reason for Referral:     Elbow pain, right  Aftercare following surgery of the musculoskeletal system    EVALUATION SUMMARY    Hand Therapy Progress Note  Current Date:  3/22/2023    Diagnosis: Right elbow dislocation  DOI: 2023  DOS: 2023  Procedure: Repair of right elbow lateral ulnar collateral ligament with internal brace supplementation, exam under anesthesia right elbow  Post: 7 w 0 d     Per MRI on :  Impression:  1. Sequelae of recent elbow dislocation:  *  Complete tears of the proximal radial collateral and lateral ulnar  collateral ligaments. High-grade sprain of the olecranon attachment of  the annular ligament.  *  Complete or near complete ulnar collateral ligament tear.  *  Full-thickness, near full width tearing of the proximal common  extensor tendon with intact proximal extensor carpi radialis.  *  Comminuted coronoid process fracture, better detailed on recent CT.  Impaction fractures of the anterior radial head and posterior  capitellum.   2. Diminutive distal biceps tendon.    Reporting period is 2023 to 3/22/2023    Subjective:   Subjective changes noted by patient:  I can't quite bring my hand to my face yet, but my ROM feels a little better every day, and nothing really hurts.   Functional changes noted by patient:  Improvement in Self Care Tasks (dressing, eating, bathing), Sleep Patterns, Recreational Activities, Household Chores and Driving   Re: Dago Dangelo   :   1960    Patient has noted adverse reaction to:  None    Objective:    Pain Level (Scale 0-10)   2023  3/10/2023 3/22/2023   At Rest 0/10 0/10 010   With Use -7/10 1-2/10 1/10 with end range mobility     Pain Description  Date 2023 3/10/23 3/22/2023   Location elbow incision, lateral elbow Hand, lateral elbow Volar forearm, elbow   Pain Quality Dull, occasionally sharp Tightness Tightness   Frequency intermittent   Intermittent Intermittent, rare   Pain is worst  daytime Daytime Daytime   Exacerbated by  Opening coffee can, end range ROM Stretch, functional activity   Overstretch, moving it wrong   Relieved by rest Tolerable stretch, rest Gentle stretch   Progression Gradually improving Gradually improving Gradually improving       ROM  Pain Report: - none  + mild    ++ moderate    +++ severe   Wrist 2023 2023 2/9/23 3/10/23 3/10/23 3/22/2023 3/22/2023   AROM Left Right R L R L R   Extension (GE) 80 34 + 47  56 NT 54   Flexion (GE) 35 40 + 50 66 56 NT 60   RD NT NT  NT NT NT NT   UD NT NT  NT NT NT NT   Supination NT Contraindicated    74 72   Pronation NT Contraindicated    77 58     Finger ROM:  Grossly WNL composite extension is noted to the digits of the right hand, approximately 75% of a composite fist. Opposition of the thumb is WNL. To the left hand, composite flexion/extension of the digits and opposition are WNL.     3/10/23: Grossly WNL composite extension is noted to the digits of the right hand, approximately 80-90% of a composite fist, slight DIP flexion lags. Opposition of the thumb is WNL. To the left hand, composite flexion/extension of the digits and opposition are WNL.             Re: Dago Dangelo   :   1960    ROM  Pain Report: - none  + mild    ++ moderate    +++ severe   Elbow 2023 2023 3/10/23 3/22/23   AROM (PROM) L R R R (AROM)   Extension  (contraindicated past 30)  57, in supine 37 26   Flexion  100 in supine 92 113   Supination  Contraindicated 66 See above   Pronation 90 64 64 See above     *Assessed in sitting.     Edema (Circumference measured in cm)    2023 2023 3/10/23    R L R   Elbow Crease 32 cm 27 cm 29.7 cm     Sensation   WNL throughout all nerve distributions; per patient report    Strength   (Measured in pounds)  Pain Report: - none  + mild    ++ moderate    +++ severe    3/22/2023 3/22/2023   Trials L R   1  2  3 55 44   Average 55 44     Please refer to the daily flowsheet for treatment today, total treatment time and time spent performing 1:1 timed codes.        Assessment:  Response to therapy has been improvement to:  ROM of Elbow:  All Planes  Wrist:  All Planes  Fingers: All Planes  Pain:  frequency is less, intensity of pain is decreased, duration of pain is decreased and less tender over affected area  Self Care Skills:  Improved activity tolerance and independence with UE dressing    Overall Assessment:  Patient's symptoms are resolving.  Patient is ready to progress to more complex exercises.  Patient is becoming more independent in home exercise program  Patient would benefit from continued therapy to achieve rehab potential  STG/LTG:  STGoals have been reviewed and progress or achievement has occurred;  see goal sheet for details and updates.  LTGoals have been reviewed and progress or achievement has occurred:  see goal sheet for details and updates.  Re: Dago Dangelo   :   1960    Plan:  Frequency/Duration:  Recommend continuing with the current treatment plan. 1 X week, once daily  for 6 weeks  Appropriateness of Rx I have re-evaluated this patient and find that the nature, scope, duration and intensity of the therapy is appropriate for the medical condition of the patient.  Recommendations for Continued Therapy    Treatment Plan:  Proceed with Derrell orthosis script, order signed    Home Exercise Program:  Per PTRX, now including independent PROM  Scar massage  Wean from long arm orthosis  Warmth  Activity modification    Next Visit:  Progress ROM, strengthening as indicated  Possible solid silicone for scar  remodeling  Continuing pursuing Derrell script    Thank you for your referral.    INQUIRIES  Therapist: JULIA Esparza, OTR/L, CLT  [facility]  [id number] [place] [id number]  Phone: [phone]  Fax: [phone]

## 2023-03-22 NOTE — PROGRESS NOTES
Hand Therapy Progress Note    Current Date:  3/22/2023    Diagnosis: Right elbow dislocation  DOI: 1/19/2023  DOS: 2/1/2023  Procedure: Repair of right elbow lateral ulnar collateral ligament with internal brace supplementation, exam under anesthesia right elbow  Post: 7 w 0 d     Per MRI on 1/31:  Impression:  1. Sequelae of recent elbow dislocation:  *  Complete tears of the proximal radial collateral and lateral ulnar  collateral ligaments. High-grade sprain of the olecranon attachment of  the annular ligament.  *  Complete or near complete ulnar collateral ligament tear.  *  Full-thickness, near full width tearing of the proximal common  extensor tendon with intact proximal extensor carpi radialis.  *  Comminuted coronoid process fracture, better detailed on recent CT.  Impaction fractures of the anterior radial head and posterior  capitellum.   2. Diminutive distal biceps tendon.    Reporting period is 2/7/2023 to 3/22/2023    Subjective:   Subjective changes noted by patient:  I can't quite bring my hand to my face yet, but my ROM feels a little better every day, and nothing really hurts.   Functional changes noted by patient:  Improvement in Self Care Tasks (dressing, eating, bathing), Sleep Patterns, Recreational Activities, Household Chores and Driving   Patient has noted adverse reaction to:  None    Objective:    Pain Level (Scale 0-10)   2/7/2023 3/10/2023 3/22/2023   At Rest 0/10 0/10 0/10   With Use 6-7/10 1-2/10 1/10 with end range mobility     Pain Description  Date 2/7/2023 3/10/23 3/22/2023   Location elbow incision, lateral elbow Hand, lateral elbow Volar forearm, elbow   Pain Quality Dull, occasionally sharp Tightness Tightness   Frequency intermittent   Intermittent Intermittent, rare   Pain is worst  daytime Daytime Daytime   Exacerbated by  Opening coffee can, end range ROM Stretch, functional activity   Overstretch, moving it wrong   Relieved by rest Tolerable stretch, rest Gentle stretch    Progression Gradually improving Gradually improving Gradually improving       ROM  Pain Report: - none  + mild    ++ moderate    +++ severe   Wrist 2/7/2023 2/7/2023 2/9/23 3/10/23 3/10/23 3/22/2023 3/22/2023   AROM Left Right R L R L R   Extension (GE) 80 34 + 47  56 NT 54   Flexion (GE) 35 40 + 50 66 56 NT 60   RD NT NT  NT NT NT NT   UD NT NT  NT NT NT NT   Supination NT Contraindicated    74 72   Pronation NT Contraindicated    77 58     Finger ROM:  Grossly WNL composite extension is noted to the digits of the right hand, approximately 75% of a composite fist. Opposition of the thumb is WNL. To the left hand, composite flexion/extension of the digits and opposition are WNL.     3/10/23: Grossly WNL composite extension is noted to the digits of the right hand, approximately 80-90% of a composite fist, slight DIP flexion lags. Opposition of the thumb is WNL. To the left hand, composite flexion/extension of the digits and opposition are WNL.     ROM  Pain Report: - none  + mild    ++ moderate    +++ severe   Elbow 2/9/2023 2/9/2023 3/10/23 3/22/23   AROM (PROM) L R R R (AROM)   Extension  (contraindicated past 30)  57, in supine 37 26   Flexion  100 in supine 92 113   Supination  Contraindicated 66 See above   Pronation 90 64 64 See above     *Assessed in sitting.     Edema (Circumference measured in cm)   2/7/2023 2/7/2023 3/10/23    R L R   Elbow Crease 32 cm 27 cm 29.7 cm     Sensation   WNL throughout all nerve distributions; per patient report    Strength   (Measured in pounds)  Pain Report: - none  + mild    ++ moderate    +++ severe    3/22/2023 3/22/2023   Trials L R   1  2  3 55 44   Average 55 44     Please refer to the daily flowsheet for treatment today, total treatment time and time spent performing 1:1 timed codes.        Assessment:  Response to therapy has been improvement to:  ROM of Elbow:  All Planes  Wrist:  All Planes  Fingers: All Planes  Pain:  frequency is less, intensity of pain is  decreased, duration of pain is decreased and less tender over affected area  Self Care Skills:  Improved activity tolerance and independence with UE dressing    Overall Assessment:  Patient's symptoms are resolving.  Patient is ready to progress to more complex exercises.  Patient is becoming more independent in home exercise program  Patient would benefit from continued therapy to achieve rehab potential  STG/LTG:  STGoals have been reviewed and progress or achievement has occurred;  see goal sheet for details and updates.  LTGoals have been reviewed and progress or achievement has occurred:  see goal sheet for details and updates.    Plan:  Frequency/Duration:  Recommend continuing with the current treatment plan. 1 X week, once daily  for 6 weeks  Appropriateness of Rx I have re-evaluated this patient and find that the nature, scope, duration and intensity of the therapy is appropriate for the medical condition of the patient.  Recommendations for Continued Therapy    Treatment Plan:  Proceed with Derrell orthosis script, order signed    Home Exercise Program:  Per PTRX, now including independent PROM  Scar massage  Wean from long arm orthosis  Warmth  Activity modification    Next Visit:  Progress ROM, strengthening as indicated  Possible solid silicone for scar remodeling  Continuing pursuing Derrell script

## 2023-03-23 NOTE — PROGRESS NOTES
[facility]    OUTPATIENT Occupational Therapy ORTHOPEDIC EVALUATION  PLAN OF TREATMENT FOR OUTPATIENT REHABILITATION  (COMPLETE FOR INITIAL CLAIMS ONLY)  Patient's Last Name, First Name, M.I.  YOB: 1960  Dago Dangelo    Provider s Name:  [facility]   Medical Record No.  [id number]   Start of Care Date:  02/07/23   Onset Date:  02/01/2301/19/23   Treatment Diagnosis:  Right elbow fracture, dislocation Medical Diagnosis:     Elbow pain, right  Aftercare following surgery of the musculoskeletal system       Goals:     03/22/23 0500   Goal #1   Goal #1 dressing   Previous Performance Level Independent   Current Functional Task Don   Current Performance Level Moderately difficult   STG Target Performance Shirt   STG Target Perform Level Mild difficulty   Due Date  04/19/23   If goal not met, Why? Progressing, goal extended   LTG Target Task/Performance Independent   Due date 05/03/23         Therapy Frequency:  1x/week  Predicted Duration of Therapy Intervention:  Additional 6 weeks. May taper pending Derrell script.    Eric Arshad OT                 I CERTIFY THE NEED FOR THESE SERVICES FURNISHED UNDER        THIS PLAN OF TREATMENT AND WHILE UNDER MY CARE     (Physician attestation of this document indicates review and certification of the therapy plan).                     Certification Date From:  03/22/23   Certification Date To:  05/03/23    Referring Provider:  Daniel Osman    Initial Assessment        See Epic Evaluation SOC Date: 02/07/23

## 2023-03-26 ENCOUNTER — HEALTH MAINTENANCE LETTER (OUTPATIENT)
Age: 63
End: 2023-03-26

## 2023-03-29 ENCOUNTER — THERAPY VISIT (OUTPATIENT)
Dept: OCCUPATIONAL THERAPY | Facility: CLINIC | Age: 63
End: 2023-03-29
Payer: COMMERCIAL

## 2023-03-29 DIAGNOSIS — Z47.89 AFTERCARE FOLLOWING SURGERY OF THE MUSCULOSKELETAL SYSTEM: ICD-10-CM

## 2023-03-29 DIAGNOSIS — M25.521 ELBOW PAIN, RIGHT: Primary | ICD-10-CM

## 2023-03-29 PROCEDURE — 97110 THERAPEUTIC EXERCISES: CPT | Mod: GO | Performed by: OCCUPATIONAL THERAPIST

## 2023-03-29 PROCEDURE — 97140 MANUAL THERAPY 1/> REGIONS: CPT | Mod: GO | Performed by: OCCUPATIONAL THERAPIST

## 2023-04-02 DIAGNOSIS — I21.4 NON-STEMI (NON-ST ELEVATED MYOCARDIAL INFARCTION) (H): ICD-10-CM

## 2023-04-04 RX ORDER — TICAGRELOR 90 MG/1
TABLET ORAL
Qty: 60 TABLET | Refills: 5 | Status: SHIPPED | OUTPATIENT
Start: 2023-04-04 | End: 2023-09-07

## 2023-04-05 ENCOUNTER — THERAPY VISIT (OUTPATIENT)
Dept: OCCUPATIONAL THERAPY | Facility: CLINIC | Age: 63
End: 2023-04-05
Payer: COMMERCIAL

## 2023-04-05 DIAGNOSIS — Z47.89 AFTERCARE FOLLOWING SURGERY OF THE MUSCULOSKELETAL SYSTEM: ICD-10-CM

## 2023-04-05 DIAGNOSIS — M25.521 ELBOW PAIN, RIGHT: Primary | ICD-10-CM

## 2023-04-05 PROCEDURE — 97110 THERAPEUTIC EXERCISES: CPT | Mod: GO | Performed by: OCCUPATIONAL THERAPIST

## 2023-04-05 PROCEDURE — 97140 MANUAL THERAPY 1/> REGIONS: CPT | Mod: GO | Performed by: OCCUPATIONAL THERAPIST

## 2023-04-12 ENCOUNTER — THERAPY VISIT (OUTPATIENT)
Dept: OCCUPATIONAL THERAPY | Facility: CLINIC | Age: 63
End: 2023-04-12
Payer: COMMERCIAL

## 2023-04-12 DIAGNOSIS — M25.521 ELBOW PAIN, RIGHT: Primary | ICD-10-CM

## 2023-04-12 DIAGNOSIS — Z47.89 AFTERCARE FOLLOWING SURGERY OF THE MUSCULOSKELETAL SYSTEM: ICD-10-CM

## 2023-04-12 PROCEDURE — 97110 THERAPEUTIC EXERCISES: CPT | Mod: GO | Performed by: OCCUPATIONAL THERAPIST

## 2023-04-12 PROCEDURE — 97140 MANUAL THERAPY 1/> REGIONS: CPT | Mod: GO | Performed by: OCCUPATIONAL THERAPIST

## 2023-04-13 NOTE — PROGRESS NOTES
SOAP note objective information for 4/19/2023.    Diagnosis: Right elbow dislocation  DOI: 1/19/2023  DOS: 2/1/2023  Procedure: Repair of right elbow lateral ulnar collateral ligament with internal brace supplementation, exam under anesthesia right elbow  Post 11 w     Recent MD visit on 3/14/23, pt to begin active and passive ROM as well as progressive weight bearing and strengthening as tolerated. Consider progressive splints at night to maintain corrections achieved with therapy.     Objective:     Pain Level (Scale 0-10)    2/7/2023 3/10/2023 3/22/2023   At Rest 0/10 0/10 0/10   With Use 6-7/10 1-2/10 1/10 with end range mobility      Pain Description  Date 2/7/2023 3/10/23 3/22/2023   Location elbow incision, lateral elbow Hand, lateral elbow Volar forearm, elbow   Pain Quality Dull, occasionally sharp Tightness Tightness   Frequency intermittent   Intermittent Intermittent, rare   Pain is worst  daytime Daytime Daytime   Exacerbated by  Opening coffee can, end range ROM Stretch, functional activity    Overstretch, moving it wrong   Relieved by rest Tolerable stretch, rest Gentle stretch   Progression Gradually improving Gradually improving Gradually improving         ROM  Pain Report: - none  + mild    ++ moderate    +++ severe   Wrist 2/7/23 2/7/2023 2/9/23 3/10/23 3/10/23 3/22/23 3/22/23   AROM Left Right R L R L R   Extension (GE) 80 34 + 47   56 NT 54   Flexion (GE) 35 40 + 50 66 56 NT 60   RD NT NT   NT NT NT NT   UD NT NT   NT NT NT NT   Supination NT Contraindicated       74 72   Pronation NT Contraindicated       77 58      Finger ROM:  Grossly WNL composite extension is noted to the digits of the right hand, approximately 75% of a composite fist. Opposition of the thumb is WNL. To the left hand, composite flexion/extension of the digits and opposition are WNL.      3/10/23: Grossly WNL composite extension is noted to the digits of the right hand, approximately 80-90% of a composite fist, slight DIP  flexion lags. Opposition of the thumb is WNL. To the left hand, composite flexion/extension of the digits and opposition are WNL.      ROM  Pain Report: - none  + mild    ++ moderate    +++ severe   Elbow 2/9/2023 2/9/2023 3/10/23 3/22/23 4/19/23   AROM (PROM) L R R R (AROM) R   Extension   (contraindicated past 30)  57, in supine 37 26 -12   Flexion   100 in supine 92 113 125   Supination   Contraindicated 66 See above WFL    Pronation 90 64 64 See above 60      *Assessed in sitting.      Edema (Circumference measured in cm)    2/7/2023 2/7/2023 3/10/23     R L R   Elbow Crease 32 cm 27 cm 29.7 cm      Sensation   WNL throughout all nerve distributions; per patient report     Strength   (Measured in pounds)  Pain Report: - none  + mild    ++ moderate    +++ severe    3/22/2023 3/22/2023   Trials L R   1  2  3 55 44   Average 55 44      Please refer to the daily flowsheet for treatment today, total treatment time and time spent performing 1:1 timed codes.

## 2023-04-19 ENCOUNTER — THERAPY VISIT (OUTPATIENT)
Dept: OCCUPATIONAL THERAPY | Facility: CLINIC | Age: 63
End: 2023-04-19
Payer: COMMERCIAL

## 2023-04-19 DIAGNOSIS — M25.521 ELBOW PAIN, RIGHT: Primary | ICD-10-CM

## 2023-04-19 DIAGNOSIS — Z47.89 AFTERCARE FOLLOWING SURGERY OF THE MUSCULOSKELETAL SYSTEM: ICD-10-CM

## 2023-04-19 PROCEDURE — 97110 THERAPEUTIC EXERCISES: CPT | Mod: GO

## 2023-04-28 NOTE — PROGRESS NOTES
Hand Therapy Progress Note    Current Date:  5/3/2023    Diagnosis: Right elbow dislocation  DOI: 1/19/2023  DOS: 2/1/2023  Procedure: Repair of right elbow lateral ulnar collateral ligament with internal brace supplementation, exam under anesthesia right elbow  Post: 13w    Reporting period is 3/22/23 to 5/3/2023  Cert from 5/4/23 to 6/28/23    Recent MD visit on 3/14/23, pt to begin active and passive ROM as well as progressive weight bearing and strengthening as tolerated. Consider progressive splints at night to maintain corrections achieved with therapy.     Subjective:   Subjective changes noted by patient:  'I get pain with using AJAY splint because of the cuff, I don't think splint is doing any more than what I already have'   Functional changes noted by patient:  Improvement in Self Care Tasks (dressing, bathing), Work Tasks, Sleep Patterns, Recreational Activities, Household Chores and Driving   Patient has noted adverse reaction to:  None    Functional Outcome Measure:  Upper Extremity Functional Index Score:  SCORE:   Column Totals: /80: 76   (A lower score indicates greater disability.)    Objective:     Pain Level (Scale 0-10)    2/7/2023 3/10/2023 3/22/2023 5/3/23   At Rest 0/10 0/10 0/10 0/10    With Use 6-7/10 1-2/10 1/10 with end range mobility 0/10       Pain Description  Date 2/7/2023 3/10/23 3/22/2023   Location elbow incision, lateral elbow Hand, lateral elbow Volar forearm, elbow   Pain Quality Dull, occasionally sharp Tightness Tightness   Frequency intermittent   Intermittent Intermittent, rare   Pain is worst  daytime Daytime Daytime   Exacerbated by  Opening coffee can, end range ROM Stretch, functional activity    Overstretch, moving it wrong   Relieved by rest Tolerable stretch, rest Gentle stretch   Progression Gradually improving Gradually improving Gradually improving         ROM  Pain Report: - none  + mild    ++ moderate    +++ severe   Wrist 2/7/23 2/7/2023 2/9/23 3/10/23 3/10/23  3/22/23 3/22/23   AROM Left Right R L R L R   Extension (GE) 80 34 + 47   56 NT 54   Flexion (GE) 35 40 + 50 66 56 NT 60   RD NT NT   NT NT NT NT   UD NT NT   NT NT NT NT   Supination NT Contraindicated       74 72   Pronation NT Contraindicated       77 58      Finger ROM:  Grossly WNL composite extension is noted to the digits of the right hand, approximately 75% of a composite fist. Opposition of the thumb is WNL. To the left hand, composite flexion/extension of the digits and opposition are WNL.      3/10/23: Grossly WNL composite extension is noted to the digits of the right hand, approximately 80-90% of a composite fist, slight DIP flexion lags. Opposition of the thumb is WNL. To the left hand, composite flexion/extension of the digits and opposition are WNL.      ROM  Pain Report: - none  + mild    ++ moderate    +++ severe   Elbow 2/9/2023 2/9/2023 3/10/23 3/22/23 4/19/23 5/3/23   AROM (PROM) L R R R (AROM) R R   Extension   (contraindicated past 30)  57, in supine 37 26 -12 -12   Flexion   100 in supine 92 113 125 130    Supination   Contraindicated 66 See above WFL  WFL    Pronation 90 64 64 See above 60 70      *Assessed in sitting.      Edema (Circumference measured in cm)    2/7/2023 2/7/2023 3/10/23     R L R   Elbow Crease 32 cm 27 cm 29.7 cm      Sensation   WNL throughout all nerve distributions; per patient report     Strength   (Measured in pounds)  Pain Report: - none  + mild    ++ moderate    +++ severe    3/22/2023 3/22/2023 5/3/23   Trials L R R   1  2  3 55 44 50    Average 55 44 50       Please refer to the daily flowsheet for treatment today, total treatment time and time spent performing 1:1 timed codes.       Assessment:  Response to therapy has been improvement to:  ROM of Elbow:  Ext , Flex and Pronation  Strength:    Self Care Skills:  Dressing, bathing, functional mobility, meal prep, driving      Overall Assessment:  Patient's symptoms are resolving.  Patient is ready to  progress to more complex exercises.  Patient is becoming more independent in home exercise program  Patient would benefit from continued therapy to achieve rehab potential  STG/LTG:  STG and LTGoals have been reviewed and progress or achievement has occurred:  see goal sheet for details and updates.    Plan:  Frequency/Duration:  Recommend continuing with the current treatment plan. 1 X bi-week, once daily  for 8 weeks  Appropriateness of Rx I have re-evaluated this patient and find that the nature, scope, duration and intensity of the therapy is appropriate for the medical condition of the patient.  Recommendations for Continued Therapy    Treatment Plan:   Modalities:  US  Therapeutic Exercise:  AROM, PROM, Tendon Gliding, Place and Hold and Stabilization  Neuromuscular re-education:  Nerve Gliding, Coordination/Dexterity, Posture and Stabilization  Manual Techniques:  Coordination/Dexterity, Friction massage, Myofascial release and Manual edema mobilization  Orthotic Fabrication:  Static, Static progressive, Forearm based and Long arm; AJAY splint  Self Care:  Self Care Tasks and Ergonomic Considerations     Discharge Plan:  Achieve all LTG.  Independent in home treatment program.  Reach maximal therapeutic benefit.    Home Exercise Program:  Elbow Passive Range of Motion Extension at Edge of Table  Sets: 4-6x/day  Repetitions: 10-15 reps (add 30-25 seconds hold with each rep)  Notes:pain-free; can hold weights in your hand for added stretch;  Elbow Passive Range of Motion Flexion  Sets: 4-6x/day  Repetitions: 10-15 reps (add 30-25 seconds hold with each rep)  Description: Keep your shoulder back and your wrist straight; bend only your elbow.  Notes:pain-free;  Hand Strengthening Gripping  Notes:Green cube. 3-5 min, 2-3x/day. Squeeze and roll, as if you're giving blood.  Elbow Strengthening Flexion with Theraband  Repetitions: 10-20  Sessions per day: 2-3  Notes:Slow and controlled pace, upper arm stable  Elbow  Strengthening Extension with Theraband  Repetitions: 10-20  Sessions per day: 2-3  Notes:Slow and controlled pace, upper arm stable  Forearm Passive Range of Motion Pronation  Sets: 4-6x/day  Repetitions: 10-15 reps (add 30-45 seconds hold)  Description: Keep your elbow tucked at your side and your shoulders level.  Notes:pain-free; can also use weight for added stretch  Elbow Strengthening Isotonic Pronation  Sets: 3x/day  Repetitions: 10 reps  Notes:pain-free  Warmth      Next Visit:  Check splint fit   Reassess elbow ROM   Progress HEP as appropriate

## 2023-05-03 ENCOUNTER — THERAPY VISIT (OUTPATIENT)
Dept: OCCUPATIONAL THERAPY | Facility: CLINIC | Age: 63
End: 2023-05-03
Payer: COMMERCIAL

## 2023-05-03 DIAGNOSIS — Z47.89 AFTERCARE FOLLOWING SURGERY OF THE MUSCULOSKELETAL SYSTEM: ICD-10-CM

## 2023-05-03 DIAGNOSIS — M25.521 ELBOW PAIN, RIGHT: Primary | ICD-10-CM

## 2023-05-03 PROCEDURE — 97763 ORTHC/PROSTC MGMT SBSQ ENC: CPT | Mod: GO

## 2023-05-03 NOTE — PROGRESS NOTES
[facility]    OUTPATIENT Occupational Therapy ORTHOPEDIC EVALUATION  PLAN OF TREATMENT FOR OUTPATIENT REHABILITATION  (COMPLETE FOR INITIAL CLAIMS ONLY)  Patient's Last Name, First Name, M.I.  YOB: 1960  Dago Dangelo    Provider s Name:  [facility]   Medical Record No.  [id number]   Start of Care Date:  02/07/23   Onset Date:  02/01/2301/19/23   Treatment Diagnosis:  Right elbow fracture, dislocation Medical Diagnosis:     Elbow pain, right  Aftercare following surgery of the musculoskeletal system       Goals:     05/03/23 0500   Goal #1   Goal #1 household chores   Previous Performance Level Independent   Current Functional Task Other - on additional line   Other Household Chores vacuuming   Current Performance Level mild difficulty   STG Target Perfomance Vacuum    STG Target Perform Level no difficulty   Due Date 05/31/23   LTG Target Task/Performance Independent   Due Date 06/28/23       Therapy Frequency:  1x/bi-week  Predicted Duration of Therapy Intervention:  8 weeks    Erik Carvajal OTR                 I CERTIFY THE NEED FOR THESE SERVICES FURNISHED UNDER        THIS PLAN OF TREATMENT AND WHILE UNDER MY CARE     (Physician attestation of this document indicates review and certification of the therapy plan).                     Certification Date From:  5/4/23  Certification Date To:  6/28/23    Referring Provider:  Daniel Osman    Initial Assessment        See Epic Evaluation SOC Date: 02/07/23

## 2023-05-12 NOTE — PROGRESS NOTES
***SOAP DUE   SOAP note objective information for 5/17/2023.    Diagnosis: Right elbow dislocation  DOI: 1/19/2023  DOS: 2/1/2023  Procedure: Repair of right elbow lateral ulnar collateral ligament with internal brace supplementation, exam under anesthesia right elbow  Post: 15w       Objective:  Pain Level (Scale 0-10)    2/7/2023 3/10/2023 3/22/2023 5/3/23   At Rest 0/10 0/10 0/10 0/10    With Use 6-7/10 1-2/10 1/10 with end range mobility 0/10       Pain Description  Date 2/7/2023 3/10/23 3/22/2023   Location elbow incision, lateral elbow Hand, lateral elbow Volar forearm, elbow   Pain Quality Dull, occasionally sharp Tightness Tightness   Frequency intermittent   Intermittent Intermittent, rare   Pain is worst  daytime Daytime Daytime   Exacerbated by  Opening coffee can, end range ROM Stretch, functional activity    Overstretch, moving it wrong   Relieved by rest Tolerable stretch, rest Gentle stretch   Progression Gradually improving Gradually improving Gradually improving         ROM  Pain Report: - none  + mild    ++ moderate    +++ severe   Wrist 2/7/23 2/7/2023 2/9/23 3/10/23 3/10/23 3/22/23 3/22/23   AROM Left Right R L R L R   Extension (GE) 80 34 + 47   56 NT 54   Flexion (GE) 35 40 + 50 66 56 NT 60   RD NT NT   NT NT NT NT   UD NT NT   NT NT NT NT   Supination NT Contraindicated       74 72   Pronation NT Contraindicated       77 58      Finger ROM:  Grossly WNL composite extension is noted to the digits of the right hand, approximately 75% of a composite fist. Opposition of the thumb is WNL. To the left hand, composite flexion/extension of the digits and opposition are WNL.      3/10/23: Grossly WNL composite extension is noted to the digits of the right hand, approximately 80-90% of a composite fist, slight DIP flexion lags. Opposition of the thumb is WNL. To the left hand, composite flexion/extension of the digits and opposition are WNL.      ROM  Pain Report: - none  + mild    ++ moderate    +++  severe   Elbow 2/9/2023 2/9/2023 3/10/23 3/22/23 4/19/23 5/3/23 5/17/23   AROM (PROM) L R R R (AROM) R R R   Extension   (contraindicated past 30)  57, in supine 37 26 -12 -12 -10   Flexion   100 in supine 92 113 125 130  134   Supination   Contraindicated 66 See above WFL  WFL  70   Pronation 90 64 64 See above 60 70 72      *Assessed in sitting.      Edema (Circumference measured in cm)     2/7/2023 2/7/2023 3/10/23     R L R   Elbow Crease 32 cm 27 cm 29.7 cm      Sensation   WNL throughout all nerve distributions; per patient report     Strength   (Measured in pounds)  Pain Report: - none  + mild    ++ moderate    +++ severe    3/22/2023 3/22/2023 5/3/23    Trials L R R    1  2  3 55 44 50  51    Average 55 44 50        Please refer to the daily flowsheet for treatment today, total treatment time and time spent performing 1:1 timed codes.           Treat   - **static progressive elbow splint   - STM biceps/triceps; heat  - PROM elbow

## 2023-05-17 ENCOUNTER — THERAPY VISIT (OUTPATIENT)
Dept: OCCUPATIONAL THERAPY | Facility: CLINIC | Age: 63
End: 2023-05-17
Payer: COMMERCIAL

## 2023-05-17 DIAGNOSIS — Z47.89 AFTERCARE FOLLOWING SURGERY OF THE MUSCULOSKELETAL SYSTEM: ICD-10-CM

## 2023-05-17 DIAGNOSIS — M25.521 ELBOW PAIN, RIGHT: Primary | ICD-10-CM

## 2023-05-17 PROCEDURE — 97763 ORTHC/PROSTC MGMT SBSQ ENC: CPT | Mod: GO

## 2023-05-31 ENCOUNTER — THERAPY VISIT (OUTPATIENT)
Dept: OCCUPATIONAL THERAPY | Facility: CLINIC | Age: 63
End: 2023-05-31
Payer: COMMERCIAL

## 2023-05-31 DIAGNOSIS — M25.521 ELBOW PAIN, RIGHT: Primary | ICD-10-CM

## 2023-05-31 DIAGNOSIS — Z47.89 AFTERCARE FOLLOWING SURGERY OF THE MUSCULOSKELETAL SYSTEM: ICD-10-CM

## 2023-05-31 PROCEDURE — 97763 ORTHC/PROSTC MGMT SBSQ ENC: CPT | Mod: GO

## 2023-06-13 ENCOUNTER — ANCILLARY PROCEDURE (OUTPATIENT)
Dept: GENERAL RADIOLOGY | Facility: CLINIC | Age: 63
End: 2023-06-13
Attending: STUDENT IN AN ORGANIZED HEALTH CARE EDUCATION/TRAINING PROGRAM
Payer: COMMERCIAL

## 2023-06-13 ENCOUNTER — OFFICE VISIT (OUTPATIENT)
Dept: ORTHOPEDICS | Facility: CLINIC | Age: 63
End: 2023-06-13
Payer: COMMERCIAL

## 2023-06-13 VITALS
BODY MASS INDEX: 29.39 KG/M2 | SYSTOLIC BLOOD PRESSURE: 134 MMHG | HEIGHT: 65 IN | DIASTOLIC BLOOD PRESSURE: 78 MMHG | WEIGHT: 176.4 LBS

## 2023-06-13 DIAGNOSIS — Z47.89 ORTHOPEDIC AFTERCARE: Primary | ICD-10-CM

## 2023-06-13 DIAGNOSIS — Z47.89 ORTHOPEDIC AFTERCARE: ICD-10-CM

## 2023-06-13 PROCEDURE — 99213 OFFICE O/P EST LOW 20 MIN: CPT | Performed by: STUDENT IN AN ORGANIZED HEALTH CARE EDUCATION/TRAINING PROGRAM

## 2023-06-13 PROCEDURE — 73080 X-RAY EXAM OF ELBOW: CPT | Mod: TC | Performed by: RADIOLOGY

## 2023-06-13 NOTE — PROGRESS NOTES
Orthopaedic Surgery Hand Clinic Progress Note    Patient Name: Dago Dangelo  MRN: [id number]  : 1960  Date: 2023    Date of Surgery: 23    Surgery Performed: 1) Repair of right elbow lateral ulnar collateral ligament with internal brace supplementation  2) Exam under anesthesia right elbow    Subjective:    23: Patient was last seen 3/14/23. He has been attending OT, working on home exercise program, and using static extension splint at nighttime. ROM is excellent and near full. He notes numbness in thumb. Previously had numbness in index through ring fingers as well but this has resolved. He describes intermittent soreness/ discomfort or dorsal radial forearm and common extensor with ADLs such as fastening shoes    Objective:  There were no vitals taken for this visit.    General: alert, appropriate, NAD  HEENT: NC/AT  CV: RRR by pulse  Pulm: Unlabored on RA  MSK:  Incision c/d/i  No erythema, drainage, evidence of infection  Elbow ROM -10 to 130, (0-140 contralateral), full pronation, supination  Negative pivot shift, negative push off test  Intact wrist flexion and extension, intact EPL, FPL, and intrinsics  Sensation intact to light touch median, radial, ulnar nerve distributions  Perfused, capillary refill less than 2 seconds    Imaging:  X-rays today reviewed by me demonstrates reduced and concentric ulnohumeral joint.  Symmetric joint space radiocapitellar joint. Nonunion coronoid fragment. Mild degenerative change.    Assessment/Plan:  62-year-old male status post repair of right elbow lateral ulnar collateral ligament 2023    Ligament is fully healed. No evidence of instability.  Weightbearing as tolerated no restrictions.    Continue hand therapy for terminal range of motion. Discussed he may have some lateral epicondylitis as well and instructed on stretching exercises.    Follow-up as needed.  All questions answered.  The patient voiced understanding and  agreement.    Daniel Osman MD    Hand, Upper Extremity & Microvascular Surgery  Department of Orthopedic Surgery  [facility]

## 2023-06-13 NOTE — LETTER
2023         RE: Dago Dangelo  [address]  [place] [address]        Dear Colleague,    Thank you for referring your patient, Dago Dangelo, to the [facility]. Please see a copy of my visit note below.    Orthopaedic Surgery Hand Clinic Progress Note    Patient Name: Dago Dangelo  MRN: [id number]  : 1960  Date: 2023    Date of Surgery: 23    Surgery Performed: 1) Repair of right elbow lateral ulnar collateral ligament with internal brace supplementation  2) Exam under anesthesia right elbow    Subjective:    23: Patient was last seen 3/14/23. He has been attending OT, working on home exercise program, and using static extension splint at nighttime. ROM is excellent and near full. He notes numbness in thumb. Previously had numbness in index through ring fingers as well but this has resolved. He describes intermittent soreness/ discomfort or dorsal radial forearm and common extensor with ADLs such as fastening shoes    Objective:  There were no vitals taken for this visit.    General: alert, appropriate, NAD  HEENT: NC/AT  CV: RRR by pulse  Pulm: Unlabored on RA  MSK:  Incision c/d/i  No erythema, drainage, evidence of infection  Elbow ROM -10 to 130, (0-140 contralateral), full pronation, supination  Negative pivot shift, negative push off test  Intact wrist flexion and extension, intact EPL, FPL, and intrinsics  Sensation intact to light touch median, radial, ulnar nerve distributions  Perfused, capillary refill less than 2 seconds    Imaging:  X-rays today reviewed by me demonstrates reduced and concentric ulnohumeral joint.  Symmetric joint space radiocapitellar joint. Nonunion coronoid fragment. Mild degenerative change.    Assessment/Plan:  62-year-old male status post repair of right elbow lateral ulnar collateral ligament 2023    Ligament is fully healed. No evidence of instability.  Weightbearing as  tolerated no restrictions.    Continue hand therapy for terminal range of motion. Discussed he may have some lateral epicondylitis as well and instructed on stretching exercises.    Follow-up as needed.  All questions answered.  The patient voiced understanding and agreement.    Daniel Osman MD    Hand, Upper Extremity & Microvascular Surgery  Department of Orthopedic Surgery  [facility]             Again, thank you for allowing me to participate in the care of your patient.        Sincerely,        Daniel Osman MD

## 2023-06-14 ENCOUNTER — THERAPY VISIT (OUTPATIENT)
Dept: OCCUPATIONAL THERAPY | Facility: CLINIC | Age: 63
End: 2023-06-14
Payer: COMMERCIAL

## 2023-06-14 DIAGNOSIS — Z47.89 AFTERCARE FOLLOWING SURGERY OF THE MUSCULOSKELETAL SYSTEM: ICD-10-CM

## 2023-06-14 DIAGNOSIS — M25.521 ELBOW PAIN, RIGHT: Primary | ICD-10-CM

## 2023-06-14 PROCEDURE — 97110 THERAPEUTIC EXERCISES: CPT | Mod: GO

## 2023-06-14 PROCEDURE — 97763 ORTHC/PROSTC MGMT SBSQ ENC: CPT | Mod: GO

## 2023-06-23 ENCOUNTER — TELEPHONE (OUTPATIENT)
Dept: FAMILY MEDICINE | Facility: CLINIC | Age: 63
End: 2023-06-23

## 2023-06-23 NOTE — LETTER
[facility]  [address] [facility] [id number] [phone]       October 9, 2023    Dago Dangelo  [address]   [place] [address]    Dear Jono,    We care about your health and have reviewed your health plan and are making recommendations based on this review, to optimize your health.    You are in particular need of attention regarding:  -Diabetes  -Coronary Artery and/or Vascular Disease    We are recommending that you:  -schedule a FOLLOWUP OFFICE APPOINTMENT with me.       -Diabetic Eye Exam is due.  If this was done within the last 12 months then please contact the clinic with the date and location so we can update your records.    In addition, here is a list of due or overdue Health Maintenance reminders.    Health Maintenance Due   Topic Date Due    Zoster (Shingles) Vaccine (1 of 2) Never done    LUNG CANCER SCREENING  Never done    Eye Exam  04/25/2023    Flu Vaccine (1) 09/01/2023    COVID-19 Vaccine (4 - 2023-24 season) 09/01/2023       To address the above recommendations, we encourage you to contact us at [phone], via [employer] or by contacting Central Scheduling toll free at [phone] 24 hours a day. They will assist you with finding the most convenient time and location.    Thank you for trusting [facility] and we appreciate the opportunity to serve you.  We look forward to supporting your healthcare needs in the future.    Healthy Regards,    Your [facility] Team

## 2023-06-23 NOTE — CONFIDENTIAL NOTE
Patient Quality Outreach    Patient is due for the following:   IVD  -  LDL (Fasting)  Physical Preventive Adult Physical    Next Steps:   Schedule a Adult Preventative    Type of outreach:    Sent [employer] message.      Questions for provider review:    None           Garrison Herron MA

## 2023-07-11 DIAGNOSIS — E11.9 TYPE 2 DIABETES MELLITUS WITHOUT COMPLICATION, WITHOUT LONG-TERM CURRENT USE OF INSULIN (H): ICD-10-CM

## 2023-07-11 RX ORDER — DAPAGLIFLOZIN 10 MG/1
TABLET, FILM COATED ORAL
Qty: 90 TABLET | Refills: 0 | Status: SHIPPED | OUTPATIENT
Start: 2023-07-11 | End: 2023-09-07

## 2023-07-26 PROBLEM — M25.521 ELBOW PAIN, RIGHT: Status: RESOLVED | Noted: 2023-02-07 | Resolved: 2023-07-26

## 2023-07-26 PROBLEM — Z47.89 AFTERCARE FOLLOWING SURGERY OF THE MUSCULOSKELETAL SYSTEM: Status: RESOLVED | Noted: 2023-02-07 | Resolved: 2023-07-26

## 2023-07-26 NOTE — PROGRESS NOTES
DISCHARGE  Reason for Discharge: Patient did not return to therapy. Current episode of care will be resolved. D/C from hand therapy.    Referring Provider:  Daniel Osman

## 2023-08-10 DIAGNOSIS — I10 BENIGN ESSENTIAL HYPERTENSION: ICD-10-CM

## 2023-08-10 DIAGNOSIS — E11.29 TYPE 2 DIABETES MELLITUS WITH MICROALBUMINURIA, WITHOUT LONG-TERM CURRENT USE OF INSULIN (H): ICD-10-CM

## 2023-08-10 DIAGNOSIS — R80.9 TYPE 2 DIABETES MELLITUS WITH MICROALBUMINURIA, WITHOUT LONG-TERM CURRENT USE OF INSULIN (H): ICD-10-CM

## 2023-08-14 RX ORDER — CARVEDILOL 25 MG/1
25 TABLET ORAL 2 TIMES DAILY WITH MEALS
Qty: 180 TABLET | Refills: 0 | Status: SHIPPED | OUTPATIENT
Start: 2023-08-14 | End: 2023-09-07

## 2023-08-14 RX ORDER — LOSARTAN POTASSIUM 100 MG/1
100 TABLET ORAL DAILY
Qty: 90 TABLET | Refills: 0 | Status: SHIPPED | OUTPATIENT
Start: 2023-08-14 | End: 2023-09-07

## 2023-08-25 ENCOUNTER — HOSPITAL ENCOUNTER (EMERGENCY)
Facility: CLINIC | Age: 63
Discharge: HOME | End: 2023-08-26
Attending: EMERGENCY MEDICINE | Admitting: EMERGENCY MEDICINE
Payer: COMMERCIAL

## 2023-08-25 ENCOUNTER — APPOINTMENT (OUTPATIENT)
Dept: GENERAL RADIOLOGY | Facility: CLINIC | Age: 63
End: 2023-08-25
Attending: EMERGENCY MEDICINE
Payer: COMMERCIAL

## 2023-08-25 DIAGNOSIS — E83.52 HYPERCALCEMIA: ICD-10-CM

## 2023-08-25 DIAGNOSIS — R07.9 CHEST PAIN, UNSPECIFIED TYPE: Primary | ICD-10-CM

## 2023-08-25 DIAGNOSIS — R10.13 ABDOMINAL PAIN, EPIGASTRIC: ICD-10-CM

## 2023-08-25 LAB
ALBUMIN SERPL BCG-MCNC: 4.9 G/DL (ref 3.5–5.2)
ALP SERPL-CCNC: 63 U/L (ref 40–129)
ALT SERPL W P-5'-P-CCNC: 15 U/L (ref 0–70)
ANION GAP SERPL CALCULATED.3IONS-SCNC: 13 MMOL/L (ref 7–15)
AST SERPL W P-5'-P-CCNC: 19 U/L (ref 0–45)
BASOPHILS # BLD AUTO: 0 10E3/UL (ref 0–0.2)
BASOPHILS NFR BLD AUTO: 0 %
BILIRUB SERPL-MCNC: 0.3 MG/DL
BUN SERPL-MCNC: 17.7 MG/DL (ref 8–23)
CALCIUM SERPL-MCNC: 10.6 MG/DL (ref 8.8–10.2)
CHLORIDE SERPL-SCNC: 100 MMOL/L (ref 98–107)
CREAT SERPL-MCNC: 0.8 MG/DL (ref 0.67–1.17)
DEPRECATED HCO3 PLAS-SCNC: 26 MMOL/L (ref 22–29)
EOSINOPHIL # BLD AUTO: 0.2 10E3/UL (ref 0–0.7)
EOSINOPHIL NFR BLD AUTO: 2 %
ERYTHROCYTE [DISTWIDTH] IN BLOOD BY AUTOMATED COUNT: 12.8 % (ref 10–15)
GFR SERPL CREATININE-BSD FRML MDRD: >90 ML/MIN/1.73M2
GLUCOSE SERPL-MCNC: 178 MG/DL (ref 70–99)
HCT VFR BLD AUTO: 43.5 % (ref 40–53)
HGB BLD-MCNC: 14.8 G/DL (ref 13.3–17.7)
HOLD SPECIMEN: NORMAL
HOLD SPECIMEN: NORMAL
IMM GRANULOCYTES # BLD: 0.1 10E3/UL
IMM GRANULOCYTES NFR BLD: 0 %
LIPASE SERPL-CCNC: 54 U/L (ref 13–60)
LYMPHOCYTES # BLD AUTO: 1.2 10E3/UL (ref 0.8–5.3)
LYMPHOCYTES NFR BLD AUTO: 10 %
MCH RBC QN AUTO: 33.1 PG (ref 26.5–33)
MCHC RBC AUTO-ENTMCNC: 34 G/DL (ref 31.5–36.5)
MCV RBC AUTO: 97 FL (ref 78–100)
MONOCYTES # BLD AUTO: 0.5 10E3/UL (ref 0–1.3)
MONOCYTES NFR BLD AUTO: 5 %
NEUTROPHILS # BLD AUTO: 9.9 10E3/UL (ref 1.6–8.3)
NEUTROPHILS NFR BLD AUTO: 83 %
NRBC # BLD AUTO: 0 10E3/UL
NRBC BLD AUTO-RTO: 0 /100
PLATELET # BLD AUTO: 302 10E3/UL (ref 150–450)
POTASSIUM SERPL-SCNC: 4.1 MMOL/L (ref 3.4–5.3)
PROT SERPL-MCNC: 7.7 G/DL (ref 6.4–8.3)
RBC # BLD AUTO: 4.47 10E6/UL (ref 4.4–5.9)
SODIUM SERPL-SCNC: 139 MMOL/L (ref 136–145)
TROPONIN T SERPL HS-MCNC: 9 NG/L
TROPONIN T SERPL HS-MCNC: 9 NG/L
WBC # BLD AUTO: 11.9 10E3/UL (ref 4–11)

## 2023-08-25 PROCEDURE — 80053 COMPREHEN METABOLIC PANEL: CPT | Performed by: EMERGENCY MEDICINE

## 2023-08-25 PROCEDURE — 93005 ELECTROCARDIOGRAM TRACING: CPT

## 2023-08-25 PROCEDURE — 84484 ASSAY OF TROPONIN QUANT: CPT | Performed by: EMERGENCY MEDICINE

## 2023-08-25 PROCEDURE — 99285 EMERGENCY DEPT VISIT HI MDM: CPT | Mod: 25

## 2023-08-25 PROCEDURE — 85025 COMPLETE CBC W/AUTO DIFF WBC: CPT | Performed by: EMERGENCY MEDICINE

## 2023-08-25 PROCEDURE — 83690 ASSAY OF LIPASE: CPT | Performed by: EMERGENCY MEDICINE

## 2023-08-25 PROCEDURE — 71046 X-RAY EXAM CHEST 2 VIEWS: CPT

## 2023-08-25 PROCEDURE — 36415 COLL VENOUS BLD VENIPUNCTURE: CPT | Performed by: EMERGENCY MEDICINE

## 2023-08-25 PROCEDURE — 84484 ASSAY OF TROPONIN QUANT: CPT | Mod: 91 | Performed by: EMERGENCY MEDICINE

## 2023-08-25 ASSESSMENT — ACTIVITIES OF DAILY LIVING (ADL): ADLS_ACUITY_SCORE: 33

## 2023-08-26 ENCOUNTER — APPOINTMENT (OUTPATIENT)
Dept: ULTRASOUND IMAGING | Facility: CLINIC | Age: 63
End: 2023-08-26
Attending: EMERGENCY MEDICINE
Payer: COMMERCIAL

## 2023-08-26 VITALS
TEMPERATURE: 97.2 F | HEART RATE: 74 BPM | DIASTOLIC BLOOD PRESSURE: 93 MMHG | RESPIRATION RATE: 18 BRPM | SYSTOLIC BLOOD PRESSURE: 128 MMHG | OXYGEN SATURATION: 93 %

## 2023-08-26 PROCEDURE — 76705 ECHO EXAM OF ABDOMEN: CPT

## 2023-08-26 ASSESSMENT — ACTIVITIES OF DAILY LIVING (ADL): ADLS_ACUITY_SCORE: 35

## 2023-08-26 NOTE — ED PROVIDER NOTES
History     Chief Complaint:  Chest Pain and Abdominal Pain       The history is provided by the patient.      Dago Dangelo is a 62 year old male with a history of a heart attack who presents with chest and abdominal pain. Patient reports an onset of pain this afternoon, with worsening pain around 1700 while sitting down, which caused him to present to the ED. He describes the pain as pressure in his abdomen, which radiated to his chest, but not all the way up. He states this pain is different than when he had his heart attack as that was pain in the middle of his chest and pressure that radiated to his arms.  He did have nausea but no vomiting.  He did take 2 Tums, which did not provide relief, so he took nitroglycerin. He has had acid reflux once in the past, but states antacid medication did not resolve his symptoms today as they did last time. Here in the ED, patient states his pain is getting better, but still has some residual abdominal pain, and his chest pain has resolved. Of note, patient had a heart attack in 2015, and his last stress test was a few years after that. He has not been seeing a cardiologist, but follows up with his primary physician. He is on Brillinta, which causes him to bruise easily.   Denies sickness, but notes he did not feel well last weekend, but he rested and felt better. Otherwise denies other radiation of his pain or shortness of breath. Also denies gallbladder or other abdominal surgeries.    Independent Historian:   None - Patient Only    Review of External Notes:   Viewed prior emergency department visit for chest pain.  Reviewed prior stress test and angiogram    Medications:    Aspirin 81 mg  Atorvastatin  Bupropion  Carvedilol  Chlorthalidone  Dapagliflozin propanediol  Gemfibrozil  Losartan  Metformin  Nitroglycerin  Brilinta    Past Medical History:    Arthritis  CAD  Diabetes mellitus type II  Hypertension  Hyperlipidemia  NSTEMI  Elevated BMI  Tobacco  dependence  CKD stage I  Closed fracture dislocation of R elbow  Diverticulosis of large intestine  Hypertriglyceridemia    Past Surgical History:    Colonoscopy   Repair ligament ulnar collateral, elbow  Stent, coronary, JENNIFER    Physical Exam   Patient Vitals for the past 24 hrs:   BP Temp Temp src Pulse Resp SpO2   08/26/23 0128 (!) 128/93 -- -- 74 -- 93 %   08/26/23 0100 125/66 -- -- 62 -- 96 %   08/26/23 0012 -- -- -- -- -- 97 %   08/25/23 2305 -- -- -- -- -- 99 %   08/25/23 2304 -- -- -- -- -- 99 %   08/25/23 2303 -- -- -- -- -- 99 %   08/25/23 2302 -- -- -- -- -- 99 %   08/25/23 2301 -- -- -- -- -- 99 %   08/25/23 2300 118/69 -- -- 67 -- 100 %   08/25/23 2123 (!) 163/78 97.2  F (36.2  C) Temporal 70 18 99 %      Physical Exam  General: Sitting up in bed  Eyes:  The pupils are equal and round    Conjunctivae and sclerae are normal  ENT:    Wearing a mask  Neck:  Normal range of motion  CV:  Regular rate, regular rhythm     Skin warm and well perfused   Resp:  Non labored breathing on room air    No tachypnea    No cough heard, lungs clear bilaterally  GI:  Abdomen is soft, there is no rigidity    No distension    No rebound tenderness     Minimal RUQ tenderness  MS:  Normal muscular tone  Skin:  No rash or acute skin lesions noted  Neuro:   Awake, alert.      Speech is normal and fluent.    Face is symmetric.     Moves all extremities equally  Psych: Normal affect.  Appropriate interactions.     Emergency Department Course   ECG  ECG taken at 2113.  Normal sinus rhythm   No significant change as compared to prior, dated 02/19/2018.  Rate 62 bpm. WY interval 158 ms. QRS duration 90 ms. QT/QTc 398/403 ms. P-R-T axes 55 56 76.     Imaging:  Abdomen US, limited (RUQ only)   Final Result   IMPRESSION:   1.  No cholelithiasis or evidence of acute cholecystitis.      2.  Diffuse hepatic steatosis.            Chest XR,  PA & LAT   Final Result   IMPRESSION: Negative chest.         Report per  radiology    Laboratory:  Labs Ordered and Resulted from Time of ED Arrival to Time of ED Departure   COMPREHENSIVE METABOLIC PANEL - Abnormal       Result Value    Sodium 139      Potassium 4.1      Chloride 100      Carbon Dioxide (CO2) 26      Anion Gap 13      Urea Nitrogen 17.7      Creatinine 0.80      Calcium 10.6 (*)     Glucose 178 (*)     Alkaline Phosphatase 63      AST 19      ALT 15      Protein Total 7.7      Albumin 4.9      Bilirubin Total 0.3      GFR Estimate >90     CBC WITH PLATELETS AND DIFFERENTIAL - Abnormal    WBC Count 11.9 (*)     RBC Count 4.47      Hemoglobin 14.8      Hematocrit 43.5      MCV 97      MCH 33.1 (*)     MCHC 34.0      RDW 12.8      Platelet Count 302      % Neutrophils 83      % Lymphocytes 10      % Monocytes 5      % Eosinophils 2      % Basophils 0      % Immature Granulocytes 0      NRBCs per 100 WBC 0      Absolute Neutrophils 9.9 (*)     Absolute Lymphocytes 1.2      Absolute Monocytes 0.5      Absolute Eosinophils 0.2      Absolute Basophils 0.0      Absolute Immature Granulocytes 0.1      Absolute NRBCs 0.0     TROPONIN T, HIGH SENSITIVITY - Normal    Troponin T, High Sensitivity 9     LIPASE - Normal    Lipase 54     TROPONIN T, HIGH SENSITIVITY - Normal    Troponin T, High Sensitivity 9        Emergency Department Course & Assessments:  Independent Interpretation (X-rays, CTs, rhythm strip):  I independently reviewed chest xray - no pneumonia seen    Assessments/Consultations/Discussion of Management or Tests:  ED Course as of 08/26/23 0234   Fri Aug 25, 2023   2328 Dr. Alvarez's evaluation   Sat Aug 26, 2023   0024 Rechecked.   0108 Rechecked.     Social Determinants of Health affecting care:   None    Disposition:  The patient was discharged to home.     Impression & Plan    CMS Diagnoses: None    Medical Decision Making:  Dago Dangelo is a 62-year-old male who presented to the emergency department with abdominal pain.  His pain seems to have gone away by the  time I evaluated him.  He has minimal right upper quadrant tenderness on exam.  No other abdominal tenderness.  He denies any chest pain currently.  His EKG appears stable.  Troponin was normal x2.  Chest x-ray is clear.  Ultrasound shows no gallstones.  Do not think imaging of his abdomen is indicated.  Very low suspicion for PE or dissection.  Doubt bowel obstruction.  His pain seems atypical for ACS and so I do not think hospitalization is indicated.  He also likely needs a nuclear medicine stress test which would not be able to be done over the weekend in the hospital.  He feels comfortable with discharge home.  I placed cardiology follow-up given that he does not have a cardiologist.  I discussed reasons to return to the emergency department with the patient.  Follow-up with primary care provider already scheduled in September and I recommended that he get his calcium rechecked given mildly elevated.    Diagnosis:    ICD-10-CM    1. Chest pain, unspecified type  R07.9 Adult Cardiology Eval  Referral      2. Hypercalcemia  E83.52       3. Abdominal pain, epigastric  R10.13          Scribe Disclosure:  I, Caro Hensley, am serving as a scribe at 11:01 PM on 8/25/2023 to document services personally performed by Pooja Alvarez MD based on my observations and the provider's statements to me.    8/25/2023   Pooja Alvarez MD Goertz, Maria Kristine, MD  08/26/23 0310

## 2023-08-26 NOTE — ED TRIAGE NOTES
Presents to ED with c/o upper abdominal pain and pressure that is going up into the chest. Symptoms began this evening. Patient stated he had similar symptoms previously when he had an MI. Denies all other symptoms. Took tums and nitroglycerin PTA without relief of symptoms.

## 2023-08-26 NOTE — DISCHARGE INSTRUCTIONS
Follow up with primary care provider at your appointment in September for a recheck of calcium  I placed cardiology referral for follow-up  Consider antacid medication like omeprazole 20 mg daily if start to notice acid reflux    Discharge Instructions  Chest Pain    You have been seen today for chest pain or discomfort.  At this time, your provider has found no signs that your chest pain is due to a serious or life-threatening condition, (or you have declined more testing and/or admission to the hospital). However, sometimes there is a serious problem that does not show up right away. Your evaluation today may not be complete and you may need further testing and evaluation.     Generally, every Emergency Department visit should have a follow-up clinic visit with either a primary or a specialty clinic/provider. Please follow-up as instructed by your emergency provider today.  Return to the Emergency Department if:  Your chest pain changes, gets worse, starts to happen more often, or comes with less activity.  You are newly short of breath.  You get very weak or tired.  You pass out or faint.  You have any new symptoms, like fever, cough, numb legs, or you cough up blood.  You have anything else that worries you.    Until you follow-up with your regular provider, please do the following:  Take one aspirin daily unless you have an allergy or are told not to by your provider.  If a stress test appointment has been made, go to the appointment.  If you have questions, contact your regular provider.  Follow-up with your regular provider/clinic as directed; this is very important.    If you were given a prescription for medicine here today, be sure to read all of the information (including the package insert) that comes with your prescription.  This will include important information about the medicine, its side effects, and any warnings that you need to know about.  The pharmacist who fills the prescription can provide  more information and answer questions you may have about the medicine.  If you have questions or concerns that the pharmacist cannot address, please call or return to the Emergency Department.       Remember that you can always come back to the Emergency Department if you are not able to see your regular provider in the amount of time listed above, if you get any new symptoms, or if there is anything that worries you.

## 2023-08-27 LAB
ATRIAL RATE - MUSE: 62 BPM
DIASTOLIC BLOOD PRESSURE - MUSE: NORMAL MMHG
INTERPRETATION ECG - MUSE: NORMAL
P AXIS - MUSE: 55 DEGREES
PR INTERVAL - MUSE: 158 MS
QRS DURATION - MUSE: 90 MS
QT - MUSE: 398 MS
QTC - MUSE: 403 MS
R AXIS - MUSE: 56 DEGREES
SYSTOLIC BLOOD PRESSURE - MUSE: NORMAL MMHG
T AXIS - MUSE: 76 DEGREES
VENTRICULAR RATE- MUSE: 62 BPM

## 2023-09-05 ASSESSMENT — ENCOUNTER SYMPTOMS
COUGH: 0
HEMATURIA: 0
WEAKNESS: 0
PALPITATIONS: 0
PARESTHESIAS: 0
FEVER: 0
NERVOUS/ANXIOUS: 0
HEARTBURN: 0
HEADACHES: 0
ARTHRALGIAS: 0
CONSTIPATION: 0
NAUSEA: 0
DIARRHEA: 0
DIZZINESS: 0
HEMATOCHEZIA: 0
ABDOMINAL PAIN: 0
MYALGIAS: 0
SHORTNESS OF BREATH: 0
SORE THROAT: 0
JOINT SWELLING: 0
DYSURIA: 0
EYE PAIN: 0
CHILLS: 0
FREQUENCY: 0

## 2023-09-07 ENCOUNTER — OFFICE VISIT (OUTPATIENT)
Dept: FAMILY MEDICINE | Facility: CLINIC | Age: 63
End: 2023-09-07
Payer: COMMERCIAL

## 2023-09-07 VITALS
DIASTOLIC BLOOD PRESSURE: 81 MMHG | TEMPERATURE: 98.3 F | HEIGHT: 65 IN | RESPIRATION RATE: 18 BRPM | HEART RATE: 66 BPM | OXYGEN SATURATION: 96 % | SYSTOLIC BLOOD PRESSURE: 135 MMHG | BODY MASS INDEX: 29.32 KG/M2 | WEIGHT: 176 LBS

## 2023-09-07 DIAGNOSIS — I21.4 NON-STEMI (NON-ST ELEVATED MYOCARDIAL INFARCTION) (H): ICD-10-CM

## 2023-09-07 DIAGNOSIS — Z00.00 ROUTINE GENERAL MEDICAL EXAMINATION AT A HEALTH CARE FACILITY: Primary | ICD-10-CM

## 2023-09-07 DIAGNOSIS — I10 BENIGN ESSENTIAL HYPERTENSION: ICD-10-CM

## 2023-09-07 DIAGNOSIS — E11.29 TYPE 2 DIABETES MELLITUS WITH MICROALBUMINURIA, WITHOUT LONG-TERM CURRENT USE OF INSULIN (H): ICD-10-CM

## 2023-09-07 DIAGNOSIS — R80.9 TYPE 2 DIABETES MELLITUS WITH MICROALBUMINURIA, WITHOUT LONG-TERM CURRENT USE OF INSULIN (H): ICD-10-CM

## 2023-09-07 DIAGNOSIS — F17.200 TOBACCO USE DISORDER: ICD-10-CM

## 2023-09-07 LAB
ANION GAP SERPL CALCULATED.3IONS-SCNC: 15 MMOL/L (ref 7–15)
BUN SERPL-MCNC: 14.8 MG/DL (ref 8–23)
CALCIUM SERPL-MCNC: 10 MG/DL (ref 8.8–10.2)
CHLORIDE SERPL-SCNC: 102 MMOL/L (ref 98–107)
CREAT SERPL-MCNC: 0.78 MG/DL (ref 0.67–1.17)
DEPRECATED HCO3 PLAS-SCNC: 20 MMOL/L (ref 22–29)
EGFRCR SERPLBLD CKD-EPI 2021: >90 ML/MIN/1.73M2
GLUCOSE SERPL-MCNC: 148 MG/DL (ref 70–99)
HBA1C MFR BLD: 6.7 % (ref 0–5.6)
HOLD SPECIMEN: NORMAL
POTASSIUM SERPL-SCNC: 4 MMOL/L (ref 3.4–5.3)
SODIUM SERPL-SCNC: 137 MMOL/L (ref 136–145)

## 2023-09-07 PROCEDURE — 80048 BASIC METABOLIC PNL TOTAL CA: CPT | Performed by: FAMILY MEDICINE

## 2023-09-07 PROCEDURE — 83036 HEMOGLOBIN GLYCOSYLATED A1C: CPT | Performed by: FAMILY MEDICINE

## 2023-09-07 PROCEDURE — 36415 COLL VENOUS BLD VENIPUNCTURE: CPT | Performed by: FAMILY MEDICINE

## 2023-09-07 PROCEDURE — 99396 PREV VISIT EST AGE 40-64: CPT | Performed by: FAMILY MEDICINE

## 2023-09-07 PROCEDURE — 99214 OFFICE O/P EST MOD 30 MIN: CPT | Mod: 25 | Performed by: FAMILY MEDICINE

## 2023-09-07 RX ORDER — CHLORTHALIDONE 25 MG/1
25 TABLET ORAL EVERY MORNING
Qty: 90 TABLET | Refills: 1 | Status: SHIPPED | OUTPATIENT
Start: 2023-09-07 | End: 2024-05-23

## 2023-09-07 RX ORDER — BUPROPION HYDROCHLORIDE 150 MG/1
150 TABLET, FILM COATED, EXTENDED RELEASE ORAL 2 TIMES DAILY
Qty: 180 TABLET | Refills: 1 | Status: SHIPPED | OUTPATIENT
Start: 2023-09-07 | End: 2024-03-22

## 2023-09-07 RX ORDER — DAPAGLIFLOZIN 10 MG/1
10 TABLET, FILM COATED ORAL DAILY
Qty: 90 TABLET | Refills: 1 | Status: SHIPPED | OUTPATIENT
Start: 2023-09-07 | End: 2024-03-22

## 2023-09-07 RX ORDER — LOSARTAN POTASSIUM 100 MG/1
100 TABLET ORAL DAILY
Qty: 90 TABLET | Refills: 0 | Status: SHIPPED | OUTPATIENT
Start: 2023-09-07 | End: 2024-03-06

## 2023-09-07 RX ORDER — CARVEDILOL 25 MG/1
25 TABLET ORAL 2 TIMES DAILY WITH MEALS
Qty: 180 TABLET | Refills: 0 | Status: SHIPPED | OUTPATIENT
Start: 2023-09-07 | End: 2024-03-06

## 2023-09-07 ASSESSMENT — ENCOUNTER SYMPTOMS
PARESTHESIAS: 0
HEARTBURN: 0
DIARRHEA: 0
WEAKNESS: 0
ARTHRALGIAS: 0
NERVOUS/ANXIOUS: 0
SHORTNESS OF BREATH: 0
ABDOMINAL PAIN: 0
DIZZINESS: 0
FREQUENCY: 0
FEVER: 0
HEMATURIA: 0
EYE PAIN: 0
SORE THROAT: 0
COUGH: 0
DYSURIA: 0
CONSTIPATION: 0
PALPITATIONS: 0
NAUSEA: 0
CHILLS: 0
MYALGIAS: 0
HEADACHES: 0
JOINT SWELLING: 0
HEMATOCHEZIA: 0

## 2023-09-07 ASSESSMENT — PAIN SCALES - GENERAL: PAINLEVEL: NO PAIN (0)

## 2023-09-07 NOTE — PROGRESS NOTES
SUBJECTIVE:   CC: Jono is an 62 year old who presents for preventative health visit.       9/7/2023    10:13 AM   Additional Questions   Roomed by Carla MCNEILL       Concerned about calcium levels.      Healthy Habits:     Getting at least 3 servings of Calcium per day:  NO    Bi-annual eye exam:  Yes    Dental care twice a year:  NO    Sleep apnea or symptoms of sleep apnea:  None    Diet:  Regular (no restrictions)    Frequency of exercise:  1 day/week    Duration of exercise:  Less than 15 minutes    Taking medications regularly:  Yes    Medication side effects:  None    Additional concerns today:  No      Seen at ER several weeks ago, noted to have slightly elevated Ca++ at that time.    Still smoking, unexpected death of sister kind of moved it back.  Not using Zyban currently, planning on trying again.      Social History     Tobacco Use    Smoking status: Every Day     Packs/day: 1.00     Years: 30.00     Pack years: 30.00     Types: Cigarettes    Smokeless tobacco: Never    Tobacco comments:     may have one once weekly   Substance Use Topics    Alcohol use: Yes     Alcohol/week: 6.0 standard drinks of alcohol     Types: 6 Cans of beer per week             9/5/2023     5:26 AM   Alcohol Use   Prescreen: >3 drinks/day or >7 drinks/week? Yes   AUDIT SCORE  5         9/5/2023     5:26 AM   AUDIT - Alcohol Use Disorders Identification Test - Reproduced from the World Health Organization Audit 2001 (Second Edition)   1.  How often do you have a drink containing alcohol? 2 to 3 times a week   2.  How many drinks containing alcohol do you have on a typical day when you are drinking? 3 or 4   3.  How often do you have five or more drinks on one occasion? Less than monthly   4.  How often during the last year have you found that you were not able to stop drinking once you had started? Never   5.  How often during the last year have you failed to do what was normally expected of you because of drinking? Never   6.   How often during the last year have you needed a first drink in the morning to get yourself going after a heavy drinking session? Never   7.  How often during the last year have you had a feeling of guilt or remorse after drinking? Never   8.  How often during the last year have you been unable to remember what happened the night before because of your drinking? Never   9.  Have you or someone else been injured because of your drinking? No   10. Has a relative, friend, doctor or other health care worker been concerned about your drinking or suggested you cut down? No   TOTAL SCORE 5       Last PSA: No results found for: PSA    Reviewed orders with patient. Reviewed health maintenance and updated orders accordingly - Yes  Lab work is in process  Labs reviewed in EPIC    Reviewed and updated as needed this visit by clinical staff   Tobacco  Allergies  Meds              Reviewed and updated as needed this visit by Provider                     Review of Systems   Constitutional:  Negative for chills and fever.   HENT:  Negative for congestion, ear pain, hearing loss and sore throat.    Eyes:  Negative for pain and visual disturbance.   Respiratory:  Negative for cough and shortness of breath.    Cardiovascular:  Negative for chest pain, palpitations and peripheral edema.   Gastrointestinal:  Negative for abdominal pain, constipation, diarrhea, heartburn, hematochezia and nausea.   Genitourinary:  Negative for dysuria, frequency, genital sores, hematuria, impotence, penile discharge and urgency.   Musculoskeletal:  Negative for arthralgias, joint swelling and myalgias.   Skin:  Negative for rash.   Neurological:  Negative for dizziness, weakness, headaches and paresthesias.   Psychiatric/Behavioral:  Negative for mood changes. The patient is not nervous/anxious.          OBJECTIVE:   /81 (BP Location: Right arm, Patient Position: Sitting, Cuff Size: Adult Regular)   Pulse 66   Temp 98.3  F (36.8  C) (Oral)    "Resp 18   Ht 1.651 m (5' 5\")   Wt 79.8 kg (176 lb)   SpO2 96%   BMI 29.29 kg/m      Physical Exam  Vitals and nursing note reviewed.   Constitutional:       Appearance: He is well-developed.   HENT:      Head: Normocephalic and atraumatic.      Right Ear: Tympanic membrane, ear canal and external ear normal.      Left Ear: Tympanic membrane, ear canal and external ear normal.   Eyes:      Pupils: Pupils are equal, round, and reactive to light.   Neck:      Thyroid: No thyromegaly.   Cardiovascular:      Rate and Rhythm: Normal rate and regular rhythm.      Heart sounds: Normal heart sounds.   Pulmonary:      Effort: Pulmonary effort is normal.      Breath sounds: Normal breath sounds.   Abdominal:      General: Bowel sounds are normal.      Palpations: Abdomen is soft. There is no mass.   Musculoskeletal:         General: Normal range of motion.   Lymphadenopathy:      Cervical: No cervical adenopathy.   Skin:     General: Skin is warm and dry.      Findings: No rash.   Neurological:      Mental Status: He is alert and oriented to person, place, and time.   Psychiatric:         Judgment: Judgment normal.         Diagnostic Test Results:  Labs reviewed in Epic    ASSESSMENT/PLAN:   (Z00.00) Routine general medical examination at a health care facility  (primary encounter diagnosis)  Comment:   Plan: Extra Green Top Tube (LAB USE ONLY), Basic         metabolic panel  (Ca, Cl, CO2, Creat, Gluc, K,         Na, BUN)            (E11.29,  R80.9) Type 2 diabetes mellitus with microalbuminuria, without long-term current use of insulin (H)  Comment: A1c stable, refilling meds  Plan: HEMOGLOBIN A1C, Extra Green Top Tube (LAB USE         ONLY), dapagliflozin (FARXIGA) 10 MG TABS         tablet, metFORMIN (GLUCOPHAGE) 500 MG tablet,         OFFICE/OUTPT VISIT,NEW,LEVL IV, Basic metabolic        panel  (Ca, Cl, CO2, Creat, Gluc, K, Na, BUN)            (I21.4) Non-STEMI (non-ST elevated myocardial infarction) (H)  Comment: " "  Plan: ticagrelor (BRILINTA) 90 MG tablet,         OFFICE/OUTPT VISIT,NEW,LEVL IV            (F17.200) Tobacco use disorder  Comment: will try again to quit in a few months  Plan: buPROPion (ZYBAN) 150 MG 12 hr tablet,         OFFICE/OUTPT VISIT,NEW,LEVL IV            (I10) Benign essential hypertension  Comment: BP looks good  Plan: carvedilol (COREG) 25 MG tablet, chlorthalidone        (HYGROTON) 25 MG tablet, losartan (COZAAR) 100         MG tablet, OFFICE/OUTPT VISIT,NEW,LEVL IV,         Basic metabolic panel  (Ca, Cl, CO2, Creat,         Gluc, K, Na, BUN)                  COUNSELING:   Reviewed preventive health counseling, as reflected in patient instructions      BMI:   Estimated body mass index is 29.29 kg/m  as calculated from the following:    Height as of this encounter: 1.651 m (5' 5\").    Weight as of this encounter: 79.8 kg (176 lb).   Weight management plan: Discussed healthy diet and exercise guidelines      He reports that he has been smoking cigarettes. He has a 30.00 pack-year smoking history. He has never used smokeless tobacco.  Nicotine/Tobacco Cessation Plan:   Pharmacotherapies : bupropion (Zyban)            Tl Ortiz MD  [facility]  "

## 2023-09-18 DIAGNOSIS — I21.4 NON-STEMI (NON-ST ELEVATED MYOCARDIAL INFARCTION) (H): ICD-10-CM

## 2023-09-18 RX ORDER — ASPIRIN 81 MG/1
TABLET, COATED ORAL
Qty: 100 TABLET | Refills: 3 | Status: SHIPPED | OUTPATIENT
Start: 2023-09-18 | End: 2024-09-18

## 2023-10-03 NOTE — PROGRESS NOTES
CARDIOLOGY CONSULT    REASON FOR CONSULT: CAD    PRIMARY CARE PHYSICIAN:  Tl Ortiz    HISTORY OF PRESENT ILLNESS:  62-year-old male seen for CAD.  He has hypertension, dyslipidemia, history of tobacco abuse, diabetes type 2.    2015 he had non-STEMI, he underwent stent to the circumflex and OM branch, he had mild to moderate disease elsewhere.  EF was 600%.    Nuclear stress 2018 was normal.    Overall he has been doing fairly well.  He does some light to moderate activity with no regular exertional chest pain symptoms.  In August he had an episode of chest and abdominal discomfort, he described it as a heaviness.  He presented to the ED, troponins and EKG were normal.  He thinks blood pressure is well controlled.  He is still on Brilinta.    PAST MEDICAL HISTORY:  Past Medical History:   Diagnosis Date    Arthritis 06/01/1984    CAD (coronary artery disease) 12/21/2015    multivessel diffuse disease, Promus KIARRA x2, OM 1, CFX    Diabetes mellitus (H)     HTN (hypertension)     Hyperlipidemia     NSTEMI (non-ST elevated myocardial infarction) (H)     Obese     Tobacco dependence        MEDICATIONS:  Current Outpatient Medications   Medication    aspirin (ASPIRIN LOW DOSE) 81 MG EC tablet    atorvastatin (LIPITOR) 40 MG tablet    buPROPion (ZYBAN) 150 MG 12 hr tablet    carvedilol (COREG) 25 MG tablet    chlorthalidone (HYGROTON) 25 MG tablet    dapagliflozin (FARXIGA) 10 MG TABS tablet    gemfibrozil (LOPID) 600 MG tablet    ibuprofen (ADVIL/MOTRIN) 600 MG tablet    losartan (COZAAR) 100 MG tablet    metFORMIN (GLUCOPHAGE) 500 MG tablet    multivitamin, therapeutic with minerals (THERA-VIT-M) TABS    nitroGLYcerin (NITROSTAT) 0.4 MG sublingual tablet    ticagrelor (BRILINTA) 90 MG tablet     No current facility-administered medications for this visit.       ALLERGIES:  No Known Allergies    SOCIAL HISTORY:  I have reviewed this patient's social history and updated it with pertinent information if needed.  Dago Dangelo  reports that he has been smoking cigarettes. He has a 30.00 pack-year smoking history. He has never used smokeless tobacco. He reports current alcohol use of about 6.0 standard drinks of alcohol per week. He reports that he does not use drugs.    FAMILY HISTORY:  I have reviewed this patient's family history and updated it with pertinent information if needed.   Family History   Problem Relation Age of Onset    Glaucoma Mother     Hypertension Mother     Hyperlipidemia Mother     Hypertension Father     Hyperlipidemia Father     Cerebrovascular Disease Father     Glaucoma Sister     Diabetes Sister     Hypertension Sister     Hyperlipidemia Sister     Colon Cancer Sister     Other Cancer Sister     Diabetes Sister     Hypertension Sister     Diabetes Sister         #3    Hyperlipidemia Sister     Hypertension Sister         #2    Other Cancer Sister     Hypertension Sister     Diabetes Sister     Diabetes Sister     Coronary Artery Disease Brother 53    Cerebrovascular Disease Brother     Cerebrovascular Disease Brother 52        (older)     Coronary Artery Disease Brother     Cerebrovascular Disease Brother     Macular Degeneration No family hx of     Anesthesia Reaction No family hx of     Deep Vein Thrombosis (DVT) No family hx of        REVIEW OF SYSTEMS:  Constitutional:  No weight loss, fever, chills  HEENT:  Eyes:  No visual loss, blurred vision, double vision or yellow sclerae. No hearing loss, sneezing, congestion, runny nose or sore throat.  Skin:  No rash or itching.  Cardiovascular: per HPI  Respiratory: per HPI  GI:  No anorexia, nausea, vomiting or diarrhea. No abdominal pain or blood.  :  No dysurea, hematuria  Neurologic:  No headache, paralysis, ataxia, numbness or tingling in the extremities. No change in bowel or bladder control.  Musculoskeletal:  No muscle pain  Hematologic:  No bleeding or bruising.  Lymphatics:  No enlarged nodes. No history of splenectomy.  Endocrine:   "No reports of sweating, cold or heat intolerance. No polyuria or polydipsia.  Allergies:  No history of asthma, hives, eczema or rhinitis.    PHYSICAL EXAM:  /76 (BP Location: Right arm, Patient Position: Sitting, Cuff Size: Adult Regular)   Pulse 66   Ht 1.651 m (5' 5\")   Wt 82.2 kg (181 lb 3.2 oz)   SpO2 96%   BMI 30.15 kg/m    Constitutional: awake, alert, no distress  Eyes: PERRL, sclera nonicteric  ENT: trachea midline  Respiratory: Lungs clear  Cardiovascular: Regular rate and rhythm, no murmurs  GI: nondistended, nontender, bowel sounds present  Lymph/Hematologic: no lymphadenopathy  Skin: dry, no rash  Musculoskeletal: good muscle tone, strength 5/5 in upper and lower extremities  Neurologic: no focal deficits  Neuropsychiatric: appropriate affact    DATA:  Labs:   Recent Labs   Lab Test 03/02/23  1254 02/11/22  0921   CHOL 185 194   HDL 33* 36*   * 110*   TRIG 243* 241*   September 2023: Potassium 4.0, creatinine 0.8    August 25: Sinus rhythm, rate 62    ASSESSMENT:  62-year-old male seen for CAD.  He has single episode of chest pain in August.  He has no regular exertional pain.  With his previous stents and ongoing tobacco use, it would be important to rule out ischemia, therefore treadmill stress echo will be done.  Lipids are not quite at goal.  He is agreeable to changing atorvastatin to rosuvastatin.  Also he is still on Brilinta which he no longer needs, this will be discontinued.    RECOMMENDATIONS:  1.  CAD with chest pain  -Treadmill stress echo  -Discontinue Brilinta, continue aspirin and other medications    2.  Dyslipidemia  -Stop atorvastatin, start rosuvastatin 40 mg once daily, check lipids in 4 to 6 weeks, goal LDL 70    Follow-up in 1 year with ELISABETH.    Amrit Soliz MD  Cardiology - [facility]  Pager:  [phone]  Text Page  October 5, 2023      "

## 2023-10-05 ENCOUNTER — OFFICE VISIT (OUTPATIENT)
Dept: CARDIOLOGY | Facility: CLINIC | Age: 63
End: 2023-10-05
Attending: EMERGENCY MEDICINE
Payer: COMMERCIAL

## 2023-10-05 VITALS
SYSTOLIC BLOOD PRESSURE: 138 MMHG | BODY MASS INDEX: 30.19 KG/M2 | HEART RATE: 66 BPM | DIASTOLIC BLOOD PRESSURE: 76 MMHG | OXYGEN SATURATION: 96 % | HEIGHT: 65 IN | WEIGHT: 181.2 LBS

## 2023-10-05 DIAGNOSIS — I25.10 CORONARY ARTERY DISEASE INVOLVING NATIVE CORONARY ARTERY OF NATIVE HEART, UNSPECIFIED WHETHER ANGINA PRESENT: Primary | ICD-10-CM

## 2023-10-05 DIAGNOSIS — E78.5 DYSLIPIDEMIA: ICD-10-CM

## 2023-10-05 DIAGNOSIS — R07.9 CHEST PAIN, UNSPECIFIED TYPE: ICD-10-CM

## 2023-10-05 PROCEDURE — 99204 OFFICE O/P NEW MOD 45 MIN: CPT | Performed by: INTERNAL MEDICINE

## 2023-10-05 RX ORDER — ROSUVASTATIN CALCIUM 40 MG/1
40 TABLET, COATED ORAL DAILY
Qty: 90 TABLET | Refills: 3 | Status: SHIPPED | OUTPATIENT
Start: 2023-10-05 | End: 2024-10-02

## 2023-10-05 NOTE — PATIENT INSTRUCTIONS
Stop atorvastatin.    Start rosuvastatin.  This is a different statin that should lower your LDL level more.  An order was placed for fasting cholesterol labs in about 6 weeks.      Treadmill stress echocardiogram  Will schedule a treadmill echo stress test.  This is a test where we take some baseline pictures of your heart with an ultrasound (echocardiogram).  You will then exercise on the treadmill while hooked up to an EKG machine.  We monitored you heart rate and blood pressure.  We will have you exercise long enough to reach a certain target heart rate.  Immediately after exercise, additional pictures are taken of your heart with the ultrasound.    If there are any severe blockages in the heart, there may be changes on the EKG or the heart pictures may look abnormal.  If the test is abnormal, often a coronary angiogram is recommended at a later time.  This is the definitive test looking for blockages in the heart and potentially fixing them with stents.    Overall the test takes 30-45 minutes.  You will be on the treadmill for 5-12 minutes.  Wear some comfortable clothes and shoes for doing some light to moderate exercise.    Your test will be at [facility].

## 2023-10-05 NOTE — LETTER
10/5/2023    Tl Ortiz [place]  [id number] [facility] [address]    RE: Dago Dangelo       Dear Colleague,     I had the pleasure of seeing Dago Dangelo in the [facility].  CARDIOLOGY CONSULT    REASON FOR CONSULT: CAD    PRIMARY CARE PHYSICIAN:  Tl Ortiz    HISTORY OF PRESENT ILLNESS:  62-year-old male seen for CAD.  He has hypertension, dyslipidemia, history of tobacco abuse, diabetes type 2.    2015 he had non-STEMI, he underwent stent to the circumflex and OM branch, he had mild to moderate disease elsewhere.  EF was 600%.    Nuclear stress 2018 was normal.    Overall he has been doing fairly well.  He does some light to moderate activity with no regular exertional chest pain symptoms.  In August he had an episode of chest and abdominal discomfort, he described it as a heaviness.  He presented to the ED, troponins and EKG were normal.  He thinks blood pressure is well controlled.  He is still on Brilinta.    PAST MEDICAL HISTORY:  Past Medical History:   Diagnosis Date    Arthritis 06/01/1984    CAD (coronary artery disease) 12/21/2015    multivessel diffuse disease, Promus KIARRA x2, OM 1, CFX    Diabetes mellitus (H)     HTN (hypertension)     Hyperlipidemia     NSTEMI (non-ST elevated myocardial infarction) (H)     Obese     Tobacco dependence        MEDICATIONS:  Current Outpatient Medications   Medication    aspirin (ASPIRIN LOW DOSE) 81 MG EC tablet    atorvastatin (LIPITOR) 40 MG tablet    buPROPion (ZYBAN) 150 MG 12 hr tablet    carvedilol (COREG) 25 MG tablet    chlorthalidone (HYGROTON) 25 MG tablet    dapagliflozin (FARXIGA) 10 MG TABS tablet    gemfibrozil (LOPID) 600 MG tablet    ibuprofen (ADVIL/MOTRIN) 600 MG tablet    losartan (COZAAR) 100 MG tablet    metFORMIN (GLUCOPHAGE) 500 MG tablet    multivitamin, therapeutic with minerals (THERA-VIT-M) TABS    nitroGLYcerin (NITROSTAT) 0.4 MG sublingual tablet    ticagrelor (BRILINTA) 90 MG tablet     No  current facility-administered medications for this visit.       ALLERGIES:  No Known Allergies    SOCIAL HISTORY:  I have reviewed this patient's social history and updated it with pertinent information if needed. Dago Dangelo  reports that he has been smoking cigarettes. He has a 30.00 pack-year smoking history. He has never used smokeless tobacco. He reports current alcohol use of about 6.0 standard drinks of alcohol per week. He reports that he does not use drugs.    FAMILY HISTORY:  I have reviewed this patient's family history and updated it with pertinent information if needed.   Family History   Problem Relation Age of Onset    Glaucoma Mother     Hypertension Mother     Hyperlipidemia Mother     Hypertension Father     Hyperlipidemia Father     Cerebrovascular Disease Father     Glaucoma Sister     Diabetes Sister     Hypertension Sister     Hyperlipidemia Sister     Colon Cancer Sister     Other Cancer Sister     Diabetes Sister     Hypertension Sister     Diabetes Sister         #3    Hyperlipidemia Sister     Hypertension Sister         #2    Other Cancer Sister     Hypertension Sister     Diabetes Sister     Diabetes Sister     Coronary Artery Disease Brother 53    Cerebrovascular Disease Brother     Cerebrovascular Disease Brother 52        (older)     Coronary Artery Disease Brother     Cerebrovascular Disease Brother     Macular Degeneration No family hx of     Anesthesia Reaction No family hx of     Deep Vein Thrombosis (DVT) No family hx of        REVIEW OF SYSTEMS:  Constitutional:  No weight loss, fever, chills  HEENT:  Eyes:  No visual loss, blurred vision, double vision or yellow sclerae. No hearing loss, sneezing, congestion, runny nose or sore throat.  Skin:  No rash or itching.  Cardiovascular: per HPI  Respiratory: per HPI  GI:  No anorexia, nausea, vomiting or diarrhea. No abdominal pain or blood.  :  No dysurea, hematuria  Neurologic:  No headache, paralysis, ataxia, numbness or  "tingling in the extremities. No change in bowel or bladder control.  Musculoskeletal:  No muscle pain  Hematologic:  No bleeding or bruising.  Lymphatics:  No enlarged nodes. No history of splenectomy.  Endocrine:  No reports of sweating, cold or heat intolerance. No polyuria or polydipsia.  Allergies:  No history of asthma, hives, eczema or rhinitis.    PHYSICAL EXAM:  /76 (BP Location: Right arm, Patient Position: Sitting, Cuff Size: Adult Regular)   Pulse 66   Ht 1.651 m (5' 5\")   Wt 82.2 kg (181 lb 3.2 oz)   SpO2 96%   BMI 30.15 kg/m    Constitutional: awake, alert, no distress  Eyes: PERRL, sclera nonicteric  ENT: trachea midline  Respiratory: Lungs clear  Cardiovascular: Regular rate and rhythm, no murmurs  GI: nondistended, nontender, bowel sounds present  Lymph/Hematologic: no lymphadenopathy  Skin: dry, no rash  Musculoskeletal: good muscle tone, strength 5/5 in upper and lower extremities  Neurologic: no focal deficits  Neuropsychiatric: appropriate affact    DATA:  Labs:   Recent Labs   Lab Test 03/02/23  1254 02/11/22  0921   CHOL 185 194   HDL 33* 36*   * 110*   TRIG 243* 241*   September 2023: Potassium 4.0, creatinine 0.8    August 25: Sinus rhythm, rate 62    ASSESSMENT:  62-year-old male seen for CAD.  He has single episode of chest pain in August.  He has no regular exertional pain.  With his previous stents and ongoing tobacco use, it would be important to rule out ischemia, therefore treadmill stress echo will be done.  Lipids are not quite at goal.  He is agreeable to changing atorvastatin to rosuvastatin.  Also he is still on Brilinta which he no longer needs, this will be discontinued.    RECOMMENDATIONS:  1.  CAD with chest pain  -Treadmill stress echo  -Discontinue Brilinta, continue aspirin and other medications    2.  Dyslipidemia  -Stop atorvastatin, start rosuvastatin 40 mg once daily, check lipids in 4 to 6 weeks, goal LDL 70    Follow-up in 1 year with " ELISABETH.    Amrit Soliz MD  Cardiology - [facility]  Pager:  [phone]  Text Page  October 5, 2023    Thank you for allowing me to participate in the care of your patient.      Sincerely,     Amrit Soliz MD   [facility]  cc:   Pooja Alvarez MD  [address] [place] [id number]

## 2023-10-09 NOTE — CONFIDENTIAL NOTE
Patient Quality Outreach    Patient is due for the following:   Diabetes -  Eye Exam  IVD  -  LDL (Fasting)    Next Steps:   No follow up needed at this time.    Type of outreach:    Sent letter.    Next Steps:  Reach out within 90 days via Letter.    Max number of attempts reached: Yes. Will try again in 90 days if patient still on fail list.    Questions for provider review:    None           Garrison Herron MA

## 2023-11-22 NOTE — TELEPHONE ENCOUNTER
ASPIRIN LOW DOSE 81 MG EC tablet  Last Written Prescription Date: 10/19/16  Last Fill Quantity: 100,  # refills: 3   Last Office Visit with G, P or [facility] prescribing provider:  7/7/2017                                              YANET Chowdhury  October 23, 2017  8:40 AM      
Prescription approved per [facility] Refill Protocol.  Brunilda Mansfield RN  
n/a

## 2023-12-01 ENCOUNTER — LAB (OUTPATIENT)
Dept: LAB | Facility: CLINIC | Age: 63
End: 2023-12-01
Payer: COMMERCIAL

## 2023-12-01 DIAGNOSIS — E78.5 DYSLIPIDEMIA: ICD-10-CM

## 2023-12-01 DIAGNOSIS — I25.10 CORONARY ARTERY DISEASE INVOLVING NATIVE CORONARY ARTERY OF NATIVE HEART, UNSPECIFIED WHETHER ANGINA PRESENT: ICD-10-CM

## 2023-12-01 LAB
CHOLEST SERPL-MCNC: 174 MG/DL
HDLC SERPL-MCNC: 40 MG/DL
LDLC SERPL CALC-MCNC: 74 MG/DL
NONHDLC SERPL-MCNC: 134 MG/DL
TRIGL SERPL-MCNC: 302 MG/DL

## 2023-12-01 PROCEDURE — 36415 COLL VENOUS BLD VENIPUNCTURE: CPT

## 2023-12-01 PROCEDURE — 80061 LIPID PANEL: CPT

## 2023-12-04 DIAGNOSIS — I25.10 CORONARY ARTERY DISEASE INVOLVING NATIVE CORONARY ARTERY OF NATIVE HEART, UNSPECIFIED WHETHER ANGINA PRESENT: Primary | ICD-10-CM

## 2023-12-04 DIAGNOSIS — E78.5 DYSLIPIDEMIA: ICD-10-CM

## 2023-12-12 ENCOUNTER — HOSPITAL ENCOUNTER (OUTPATIENT)
Dept: CARDIOLOGY | Facility: CLINIC | Age: 63
Discharge: HOME | End: 2023-12-12
Attending: INTERNAL MEDICINE | Admitting: INTERNAL MEDICINE
Payer: COMMERCIAL

## 2023-12-12 DIAGNOSIS — R07.9 CHEST PAIN, UNSPECIFIED TYPE: ICD-10-CM

## 2023-12-12 DIAGNOSIS — I25.10 CORONARY ARTERY DISEASE INVOLVING NATIVE CORONARY ARTERY OF NATIVE HEART, UNSPECIFIED WHETHER ANGINA PRESENT: ICD-10-CM

## 2023-12-12 PROCEDURE — 93350 STRESS TTE ONLY: CPT | Mod: TC

## 2023-12-12 PROCEDURE — 93321 DOPPLER ECHO F-UP/LMTD STD: CPT | Mod: 26 | Performed by: INTERNAL MEDICINE

## 2023-12-12 PROCEDURE — 93325 DOPPLER ECHO COLOR FLOW MAPG: CPT | Mod: 26 | Performed by: INTERNAL MEDICINE

## 2023-12-12 PROCEDURE — 93325 DOPPLER ECHO COLOR FLOW MAPG: CPT | Mod: TC

## 2023-12-12 PROCEDURE — 93017 CV STRESS TEST TRACING ONLY: CPT

## 2023-12-12 PROCEDURE — 93016 CV STRESS TEST SUPVJ ONLY: CPT | Performed by: INTERNAL MEDICINE

## 2023-12-12 PROCEDURE — 93018 CV STRESS TEST I&R ONLY: CPT | Performed by: INTERNAL MEDICINE

## 2023-12-12 PROCEDURE — 93350 STRESS TTE ONLY: CPT | Mod: 26 | Performed by: INTERNAL MEDICINE

## 2024-03-06 DIAGNOSIS — E11.29 TYPE 2 DIABETES MELLITUS WITH MICROALBUMINURIA, WITHOUT LONG-TERM CURRENT USE OF INSULIN (H): ICD-10-CM

## 2024-03-06 DIAGNOSIS — R80.9 TYPE 2 DIABETES MELLITUS WITH MICROALBUMINURIA, WITHOUT LONG-TERM CURRENT USE OF INSULIN (H): ICD-10-CM

## 2024-03-06 DIAGNOSIS — I10 BENIGN ESSENTIAL HYPERTENSION: ICD-10-CM

## 2024-03-06 RX ORDER — LOSARTAN POTASSIUM 100 MG/1
100 TABLET ORAL DAILY
Qty: 90 TABLET | Refills: 1 | Status: SHIPPED | OUTPATIENT
Start: 2024-03-06 | End: 2024-09-19

## 2024-03-06 RX ORDER — CARVEDILOL 25 MG/1
25 TABLET ORAL 2 TIMES DAILY WITH MEALS
Qty: 180 TABLET | Refills: 1 | Status: SHIPPED | OUTPATIENT
Start: 2024-03-06 | End: 2024-08-22

## 2024-03-08 NOTE — TELEPHONE ENCOUNTER
Patient currently does not have health insurance and is waiting until he is able to get back on health insurance to schedule an appointment. Patient states he will call when he has health insurance.     Yulisa Johnson  Lead   [facility]

## 2024-03-17 ENCOUNTER — HEALTH MAINTENANCE LETTER (OUTPATIENT)
Age: 64
End: 2024-03-17

## 2024-03-22 ENCOUNTER — MYC REFILL (OUTPATIENT)
Dept: FAMILY MEDICINE | Facility: CLINIC | Age: 64
End: 2024-03-22
Payer: COMMERCIAL

## 2024-03-22 DIAGNOSIS — F17.200 TOBACCO USE DISORDER: ICD-10-CM

## 2024-03-22 DIAGNOSIS — E11.29 TYPE 2 DIABETES MELLITUS WITH MICROALBUMINURIA, WITHOUT LONG-TERM CURRENT USE OF INSULIN (H): ICD-10-CM

## 2024-03-22 DIAGNOSIS — R80.9 TYPE 2 DIABETES MELLITUS WITH MICROALBUMINURIA, WITHOUT LONG-TERM CURRENT USE OF INSULIN (H): ICD-10-CM

## 2024-03-25 RX ORDER — BUPROPION HYDROCHLORIDE 150 MG/1
150 TABLET, FILM COATED, EXTENDED RELEASE ORAL 2 TIMES DAILY
Qty: 180 TABLET | Refills: 1 | Status: SHIPPED | OUTPATIENT
Start: 2024-03-25 | End: 2024-09-30

## 2024-03-25 RX ORDER — DAPAGLIFLOZIN 10 MG/1
10 TABLET, FILM COATED ORAL DAILY
Qty: 90 TABLET | Refills: 1 | Status: SHIPPED | OUTPATIENT
Start: 2024-03-25 | End: 2024-04-19

## 2024-03-25 NOTE — TELEPHONE ENCOUNTER
Spoke with the Pt in regards to the medication below.     Pt states that currently he has no insurance until April 1st.     Pt would like make sure his insurance is active before he would scheduled due to the insurance being the factor.     Pt will call back to schedule after April 1st, for the DM visit.     Pt is aware that his medication has been refilled.     Will forward to the provider message, just to review the notes.     Jaclyn Luis

## 2024-04-17 ENCOUNTER — TELEPHONE (OUTPATIENT)
Dept: FAMILY MEDICINE | Facility: CLINIC | Age: 64
End: 2024-04-17
Payer: COMMERCIAL

## 2024-04-17 NOTE — TELEPHONE ENCOUNTER
Reason for Call:  Appointment Request    Patient requesting this type of appt: Chronic Diease Management/Medication/Follow-Up    Requested provider: Tl Ortiz    Reason patient unable to be scheduled: Not within requested timeframe    When does patient want to be seen/preferred time: ASAP    Comments: Pt needs appt sooner than available for diabetic f/u    Could we send this information to you in [facility] or would you prefer to receive a phone call?:   Patient would prefer a phone call   Okay to leave a detailed message?: Yes at Home number on file [phone] (home)    Call taken on 4/17/2024 at 11:39 AM by VELASQUEZ RESENDEZ

## 2024-04-17 NOTE — TELEPHONE ENCOUNTER
Spoke with the Pt in regards to message below.       Pt has been scheduled w/ pcp for a diabetic f/u this Friday.     Jaclyn Luis

## 2024-04-19 ENCOUNTER — OFFICE VISIT (OUTPATIENT)
Dept: FAMILY MEDICINE | Facility: CLINIC | Age: 64
End: 2024-04-19
Payer: COMMERCIAL

## 2024-04-19 VITALS
TEMPERATURE: 98.1 F | HEIGHT: 65 IN | SYSTOLIC BLOOD PRESSURE: 128 MMHG | BODY MASS INDEX: 30.82 KG/M2 | WEIGHT: 185 LBS | HEART RATE: 82 BPM | DIASTOLIC BLOOD PRESSURE: 68 MMHG | OXYGEN SATURATION: 97 % | RESPIRATION RATE: 20 BRPM

## 2024-04-19 DIAGNOSIS — R80.9 TYPE 2 DIABETES MELLITUS WITH MICROALBUMINURIA, WITHOUT LONG-TERM CURRENT USE OF INSULIN (H): Primary | ICD-10-CM

## 2024-04-19 DIAGNOSIS — N18.1 CHRONIC KIDNEY DISEASE, STAGE 1: ICD-10-CM

## 2024-04-19 DIAGNOSIS — E11.29 TYPE 2 DIABETES MELLITUS WITH MICROALBUMINURIA, WITHOUT LONG-TERM CURRENT USE OF INSULIN (H): Primary | ICD-10-CM

## 2024-04-19 LAB
HBA1C MFR BLD: 7.3 % (ref 0–5.6)
HOLD SPECIMEN: NORMAL
HOLD SPECIMEN: NORMAL

## 2024-04-19 PROCEDURE — 36415 COLL VENOUS BLD VENIPUNCTURE: CPT | Performed by: FAMILY MEDICINE

## 2024-04-19 PROCEDURE — 82570 ASSAY OF URINE CREATININE: CPT | Performed by: FAMILY MEDICINE

## 2024-04-19 PROCEDURE — G2212 PROLONG OUTPT/OFFICE VIS: HCPCS | Performed by: FAMILY MEDICINE

## 2024-04-19 PROCEDURE — 99207 PR FOOT EXAM NO CHARGE: CPT | Performed by: FAMILY MEDICINE

## 2024-04-19 PROCEDURE — 83036 HEMOGLOBIN GLYCOSYLATED A1C: CPT | Performed by: FAMILY MEDICINE

## 2024-04-19 PROCEDURE — 82043 UR ALBUMIN QUANTITATIVE: CPT | Performed by: FAMILY MEDICINE

## 2024-04-19 PROCEDURE — 99214 OFFICE O/P EST MOD 30 MIN: CPT | Performed by: FAMILY MEDICINE

## 2024-04-19 ASSESSMENT — PAIN SCALES - GENERAL: PAINLEVEL: NO PAIN (0)

## 2024-04-19 NOTE — PROGRESS NOTES
"  Assessment and Plan    (E11.29,  R80.9) Type 2 diabetes mellitus with microalbuminuria, without long-term current use of insulin (H)  (primary encounter diagnosis)  Comment: A1c trending up, adding SGLT2I  Plan: HEMOGLOBIN A1C, empagliflozin (JARDIANCE) 25 MG        TABS tablet, FOOT EXAM            (N18.1) Chronic kidney disease, stage 1  Comment: monitoring  Plan: Albumin Random Urine Quantitative with Creat         Ratio              RTC in     Tl Ortiz MD      Kelley De La Cruz is a 63 year old, presenting for the following health issues:  Diabetes and Follow Up        4/19/2024    12:41 PM   Additional Questions   Roomed by Carla MCNEILL     History of Present Illness       Diabetes:   He presents for follow up of diabetes.    He is not checking blood glucose.         He has no concerns regarding his diabetes at this time.   He is not experiencing numbness or burning in feet, excessive thirst, blurry vision, weight changes or redness, sores or blisters on feet. The patient has not had a diabetic eye exam in the last 12 months.          He eats 0-1 servings of fruits and vegetables daily.He consumes 1 sweetened beverage(s) daily.He exercises with enough effort to increase his heart rate 9 or less minutes per day.  He exercises with enough effort to increase his heart rate 3 or less days per week.   He is taking medications regularly.     Feeling well.  Brings in letter from insurance stating that dapgliflozin is not covered.     Is still smoking, can go for a fee days and then restarts.    Is due for eye exam, just got new insurance and had been waiting unti lthat was settled.          Objective    /68 (BP Location: Right arm, Patient Position: Sitting, Cuff Size: Adult Large)   Pulse 82   Temp 98.1  F (36.7  C) (Oral)   Resp 20   Ht 1.651 m (5' 5\")   Wt 83.9 kg (185 lb)   SpO2 97%   BMI 30.79 kg/m    Body mass index is 30.79 kg/m .  Physical Exam  Vitals reviewed.   HENT:      Head: " Normocephalic and atraumatic.      Right Ear: Tympanic membrane, ear canal and external ear normal.      Left Ear: Tympanic membrane, ear canal and external ear normal.      Nose: Nose normal.   Eyes:      Extraocular Movements: Extraocular movements intact.      Conjunctiva/sclera: Conjunctivae normal.      Pupils: Pupils are equal, round, and reactive to light.   Cardiovascular:      Rate and Rhythm: Normal rate and regular rhythm.      Pulses:           Dorsalis pedis pulses are 2+ on the right side and 0 on the left side.      Heart sounds: Normal heart sounds.      Comments: JOSIE feet warm, well perfused with brisk cap refill  Pulmonary:      Effort: Pulmonary effort is normal.      Breath sounds: Normal breath sounds.   Abdominal:      General: Bowel sounds are normal.      Tenderness: There is no abdominal tenderness.   Musculoskeletal:      Cervical back: Neck supple.   Lymphadenopathy:      Cervical: No cervical adenopathy.   Skin:     General: Skin is warm and dry.      Comments: Skin on feet healthy.  No wounds, callous, onychomycosis.   Neurological:      Mental Status: He is alert and oriented to person, place, and time.      Gait: Gait normal.      Deep Tendon Reflexes:      Reflex Scores:       Patellar reflexes are 2+ on the right side and 2+ on the left side.       Achilles reflexes are 2+ on the right side and 2+ on the left side.     Comments: Nl filament and proprioception in feet JOSIE   Psychiatric:         Mood and Affect: Mood normal.         Behavior: Behavior normal.                    Signed Electronically by: Tl Ortiz MD

## 2024-04-21 LAB
CREAT UR-MCNC: 37.4 MG/DL
MICROALBUMIN UR-MCNC: <12 MG/L
MICROALBUMIN/CREAT UR: NORMAL MG/G{CREAT}

## 2024-05-22 ENCOUNTER — OFFICE VISIT (OUTPATIENT)
Dept: OPTOMETRY | Facility: CLINIC | Age: 64
End: 2024-05-22
Payer: COMMERCIAL

## 2024-05-22 DIAGNOSIS — E11.3299 NONPROLIFERATIVE DIABETIC RETINOPATHY ASSOCIATED WITH TYPE 2 DIABETES MELLITUS (H): Primary | ICD-10-CM

## 2024-05-22 DIAGNOSIS — H52.03 HYPEROPIA OF BOTH EYES WITH ASTIGMATISM: ICD-10-CM

## 2024-05-22 DIAGNOSIS — H25.13 NUCLEAR SCLEROSIS OF BOTH EYES: ICD-10-CM

## 2024-05-22 DIAGNOSIS — H52.203 HYPEROPIA OF BOTH EYES WITH ASTIGMATISM: ICD-10-CM

## 2024-05-22 DIAGNOSIS — H52.4 PRESBYOPIA: ICD-10-CM

## 2024-05-22 PROCEDURE — 92015 DETERMINE REFRACTIVE STATE: CPT | Performed by: OPTOMETRIST

## 2024-05-22 PROCEDURE — 92014 COMPRE OPH EXAM EST PT 1/>: CPT | Performed by: OPTOMETRIST

## 2024-05-22 ASSESSMENT — REFRACTION_MANIFEST
OS_SPHERE: PLANO
OD_AXIS: 007
OS_CYLINDER: +1.00
OD_ADD: +2.50
METHOD_AUTOREFRACTION: 1
OS_AXIS: 163
OD_CYLINDER: +1.25
OS_CYLINDER: +1.50
OD_AXIS: 009
OD_SPHERE: PLANO
OS_ADD: +2.50
OS_AXIS: 155
OD_SPHERE: -0.25
OS_SPHERE: -0.50
OD_CYLINDER: +0.75

## 2024-05-22 ASSESSMENT — KERATOMETRY
OS_AXISANGLE2_DEGREES: 085
OS_K1POWER_DIOPTERS: 46.37
OS_K2POWER_DIOPTERS: 46.50
OD_K1POWER_DIOPTERS: 45.87
OS_AXISANGLE_DEGREES: 175
OD_AXISANGLE2_DEGREES: 090
OD_K2POWER_DIOPTERS: 46.75
OD_AXISANGLE_DEGREES: 180

## 2024-05-22 ASSESSMENT — CONF VISUAL FIELD
OS_NORMAL: 1
OD_SUPERIOR_NASAL_RESTRICTION: 0
OD_SUPERIOR_TEMPORAL_RESTRICTION: 0
OS_INFERIOR_TEMPORAL_RESTRICTION: 0
OD_INFERIOR_NASAL_RESTRICTION: 0
OS_SUPERIOR_NASAL_RESTRICTION: 0
OS_INFERIOR_NASAL_RESTRICTION: 0
OD_INFERIOR_TEMPORAL_RESTRICTION: 0
OS_SUPERIOR_TEMPORAL_RESTRICTION: 0
OD_NORMAL: 1

## 2024-05-22 ASSESSMENT — VISUAL ACUITY
OD_SC+: -1
OS_SC: 20/30
OD_SC: 20/100
OD_SC: 20/25
METHOD: SNELLEN - LINEAR
OS_SC: 20/40

## 2024-05-22 ASSESSMENT — TONOMETRY
IOP_METHOD: APPLANATION
OS_IOP_MMHG: 18
OD_IOP_MMHG: 18

## 2024-05-22 ASSESSMENT — SLIT LAMP EXAM - LIDS
COMMENTS: MEIBOMIAN GLAND DYSFUNCTION, UPPER LID DERMATOCHALASIS
COMMENTS: MEIBOMIAN GLAND DYSFUNCTION, UPPER LID DERMATOCHALASIS

## 2024-05-22 ASSESSMENT — REFRACTION_WEARINGRX
OD_SPHERE: +2.50
OS_SPHERE: +2.50

## 2024-05-22 ASSESSMENT — EXTERNAL EXAM - LEFT EYE: OS_EXAM: NORMAL

## 2024-05-22 ASSESSMENT — EXTERNAL EXAM - RIGHT EYE: OD_EXAM: NORMAL

## 2024-05-22 NOTE — PROGRESS NOTES
Chief Complaint   Patient presents with    Diabetic Eye Exam        Chief Complaint(s) and History of Present Illness(es)       Diabetic Eye Exam              Diabetes Type: Type 2 and taking oral medications    Duration: 9 years    Blood Sugars: fluctuates                   Lab Results   Component Value Date    A1C 7.3 04/19/2024    A1C 6.7 09/07/2023    A1C 6.6 03/02/2023    A1C 6.7 12/01/2022    A1C 6.7 08/12/2022    A1C 8.3 07/08/2021    A1C 9.8 06/08/2021    A1C 9.0 01/22/2021    A1C 7.4 08/17/2018    A1C 7.6 02/20/2018            Last Eye Exam: 4-  Dilated Previously: Yes    What are you currently using to see?  Otc readers    Distance Vision Acuity: Satisfied with vision    Near Vision Acuity: Not satisfied     Eye Comfort: good  Do you use eye drops? : No  Occupation or Hobbies: retired     Luma Drake Optometric Assistant, A.B.O.C.     Medical, surgical and family histories reviewed and updated 5/22/2024.       OBJECTIVE: See Ophthalmology exam    ASSESSMENT:    ICD-10-CM    1. Nonproliferative diabetic retinopathy associated with type 2 diabetes mellitus (H)  E11.3299       2. Presbyopia  H52.4       3. Hyperopia of both eyes with astigmatism  H52.03     H52.203       4. Nuclear sclerosis of both eyes  H25.13         Very mild, no DME  PLAN:  Lower glucose to prevent worsening of retinopathy   Distance prescription recommended but not needed to pass license  Dago Dangelo aware  eye exam results will be sent to Tl Ortiz.    Keara Stanton OD

## 2024-05-22 NOTE — PATIENT INSTRUCTIONS
Patient Education   Diabetes weakens the blood vessels all over the body, including the eyes. Damage to the blood vessels in the eyes can cause swelling or bleeding into part of the eye (called the retina). This is called diabetic retinopathy (FAUSTINO-tin--pu-thee). If not treated, this disease can cause vision loss or blindness.   Symptoms may include blurred or distorted vision, but many people have no symptoms. It's important to see your eye doctor regularly to check for problems.   Early treatment and good control can help protect your vision. Here are the things you can do to help prevent vision loss:      1. Keep your blood sugar levels under tight control.      2. Bring high blood pressure under control.      3. No smoking.      4. Have yearly dilated eye exams.

## 2024-05-22 NOTE — LETTER
5/22/2024         RE: Dago Dangelo  [address]  [place] [address]        Dear Colleague,    Thank you for referring your patient, Dago Dangelo, to the [facility]. Please see a copy of my visit note below.    Chief Complaint   Patient presents with     Diabetic Eye Exam        Chief Complaint(s) and History of Present Illness(es)       Diabetic Eye Exam              Diabetes Type: Type 2 and taking oral medications    Duration: 9 years    Blood Sugars: fluctuates                   Lab Results   Component Value Date    A1C 7.3 04/19/2024    A1C 6.7 09/07/2023    A1C 6.6 03/02/2023    A1C 6.7 12/01/2022    A1C 6.7 08/12/2022    A1C 8.3 07/08/2021    A1C 9.8 06/08/2021    A1C 9.0 01/22/2021    A1C 7.4 08/17/2018    A1C 7.6 02/20/2018            Last Eye Exam: 4-  Dilated Previously: Yes    What are you currently using to see?  Otc readers    Distance Vision Acuity: Satisfied with vision    Near Vision Acuity: Not satisfied     Eye Comfort: good  Do you use eye drops? : No  Occupation or Hobbies: retired     Luma Drake Optometric Assistant, A.B.O.C.     Medical, surgical and family histories reviewed and updated 5/22/2024.       OBJECTIVE: See Ophthalmology exam    ASSESSMENT:    ICD-10-CM    1. Nonproliferative diabetic retinopathy associated with type 2 diabetes mellitus (H)  E11.3299       2. Presbyopia  H52.4       3. Hyperopia of both eyes with astigmatism  H52.03     H52.203       4. Nuclear sclerosis of both eyes  H25.13         Very mild, no DME  PLAN:  Lower glucose to prevent worsening of retinopathy   Distance prescription recommended but not needed to pass license  Dago Dangelo aware  eye exam results will be sent to Tl Ortiz.    Keara Stanton OD            Again, thank you for allowing me to participate in the care of your patient.        Sincerely,        Keara Stanton, OD

## 2024-05-23 DIAGNOSIS — E78.1 HYPERTRIGLYCERIDEMIA: ICD-10-CM

## 2024-05-23 DIAGNOSIS — I10 BENIGN ESSENTIAL HYPERTENSION: ICD-10-CM

## 2024-05-23 RX ORDER — CHLORTHALIDONE 25 MG/1
25 TABLET ORAL EVERY MORNING
Qty: 90 TABLET | Refills: 0 | Status: SHIPPED | OUTPATIENT
Start: 2024-05-23 | End: 2024-08-22

## 2024-05-23 RX ORDER — GEMFIBROZIL 600 MG/1
600 TABLET, FILM COATED ORAL 2 TIMES DAILY
Qty: 180 TABLET | Refills: 3 | Status: SHIPPED | OUTPATIENT
Start: 2024-05-23

## 2024-05-23 NOTE — PATIENT INSTRUCTIONS
Thank you for choosing [facility]    Dr. Osman Locations:    [facility] [address], [address]  [place], [place] [address] [place] [id number]   Appointments: [phone] Appointments: [phone]   Fax: [phone] Fax: [phone]     Follow up: 3/14/23    Please call [phone] to schedule your follow up appointment.         [facility]    Daniel Osman MD  Orthopaedic Surgery  [facility]    Duration: 2x per week for 6 weeks    0-7 Days post op:    PRECAUTIONS   Non weight bearing  Avoid varus strain by limiting shoulder abduction and internal rotation (no  chicken wing  position)    EXERCISES  Active/passive digit movement  Straight plane shoulder flexion (avoid shoulder abduction and IR)    SPLINTING    Remain in post-op splint, typically in elbow flexion with neutral to pronated forearm position    Clinical Reasoning --Elbow flexion approximates the radial head against the coronoid increasing elbow stability. Forearm pronation decreases the stress on the lateral ligament. Shoulder abduction and internal rotation places a varus strain on the lateral ligament    Weeks 1-6 post op:   PRECAUTIONS  Non weight bearing  Avoid varus strain by limiting shoulder abduction and internal rotation (no  chicken wing  position)  Avoid supination/extension/valgus (like pushing off from a chair) as this stresses the lateral structures.     EXERCISES   All exercises performed from supine with shoulder flexion to 90  (unless proximal shoulder injury present)  Forearm pronation/supination performed in 90 elbow flexion  No elbow extension beyond 30  (2 weeks). Performed from supine with shoulder flexion to 90  and forearm pronation. Gradual extension in pronation  Gentle active assisted pronation/supination (elbow must be in at least 90  of  flexion)  Gentle active assisted elbow flexion (to full range) and extension to 30 , forearm must be in neutral to pronation    SPLINTING   Custom thermoplast orthosis  Elbow flexion to at least 90   Forearm in neutral to pronation  Wear at all times including while sleeping. May take off for exercises and hygiene.     Clinical Reasoning -- Overhead position decreases the effects of gravity and allows the triceps to function as an elbow stabilizer. Elbow flexion approximates the radial head against the coronoid increasing elbow stability. Forearm pronation decreases the stress on the lateral ligaments. Terminal extension with supination is an unstable position for the elbow      ** For patients with ipsilateral shoulder injuries preventing shoulder flexion -- modified position is seated with elbow at side and flexed to 90 with palm pronated.**      No

## 2024-06-06 ENCOUNTER — LAB (OUTPATIENT)
Dept: LAB | Facility: CLINIC | Age: 64
End: 2024-06-06
Payer: COMMERCIAL

## 2024-06-06 DIAGNOSIS — I25.10 CORONARY ARTERY DISEASE INVOLVING NATIVE CORONARY ARTERY OF NATIVE HEART, UNSPECIFIED WHETHER ANGINA PRESENT: ICD-10-CM

## 2024-06-06 DIAGNOSIS — E78.5 DYSLIPIDEMIA: ICD-10-CM

## 2024-06-06 PROCEDURE — 80061 LIPID PANEL: CPT

## 2024-06-06 PROCEDURE — 36415 COLL VENOUS BLD VENIPUNCTURE: CPT

## 2024-06-07 ENCOUNTER — HOSPITAL ENCOUNTER (EMERGENCY)
Dept: HOSPITAL 74 - FER | Age: 64
LOS: 1 days | Discharge: HOME | End: 2024-06-08
Payer: SELF-PAY

## 2024-06-07 ENCOUNTER — TELEPHONE (OUTPATIENT)
Dept: CARDIOLOGY | Facility: CLINIC | Age: 64
End: 2024-06-07

## 2024-06-07 VITALS — DIASTOLIC BLOOD PRESSURE: 94 MMHG | SYSTOLIC BLOOD PRESSURE: 156 MMHG | TEMPERATURE: 97.8 F | RESPIRATION RATE: 18 BRPM

## 2024-06-07 VITALS — BODY MASS INDEX: 20.5 KG/M2

## 2024-06-07 DIAGNOSIS — R07.9: ICD-10-CM

## 2024-06-07 DIAGNOSIS — M79.10: ICD-10-CM

## 2024-06-07 DIAGNOSIS — R53.1: ICD-10-CM

## 2024-06-07 DIAGNOSIS — E10.65: Primary | ICD-10-CM

## 2024-06-07 LAB
ALBUMIN SERPL-MCNC: 3.9 G/DL (ref 3.4–5)
ALP SERPL-CCNC: 117 U/L (ref 45–117)
ALT SERPL-CCNC: 103 U/L (ref 7–52)
ANION GAP SERPL CALC-SCNC: 7 MMOL/L (ref 4–13)
AST SERPL-CCNC: 43 U/L (ref 15–37)
BILIRUB SERPL-MCNC: 0.5 MG/DL (ref 0.2–1)
BUN SERPL-MCNC: 18 MG/DL (ref 7–18)
CALCIUM SERPL-MCNC: 9.5 MG/DL (ref 8.5–10.1)
CHLORIDE SERPL-SCNC: 93 MMOL/L (ref 98–107)
CHOLEST SERPL-MCNC: 203 MG/DL
CO2 SERPL-SCNC: 28 MMOL/L (ref 21–32)
CREAT SERPL-MCNC: 1 MG/DL (ref 0.6–1.3)
DEPRECATED RDW RBC AUTO: 16.6 % (ref 11.9–15.9)
FASTING STATUS PATIENT QL REPORTED: YES
GLUCOSE SERPL-MCNC: 703 MG/DL (ref 74–106)
HCT VFR BLD CALC: 38.8 % (ref 35.4–49)
HDLC SERPL-MCNC: 42 MG/DL
HGB BLD-MCNC: 12.5 G/DL (ref 11.7–16.9)
LDLC SERPL CALC-MCNC: 114 MG/DL
MCH RBC QN AUTO: 23.7 PG (ref 25.7–33.7)
MCHC RBC AUTO-ENTMCNC: 32.3 G/DL (ref 32–35.9)
MCV RBC: 73.4 FL (ref 80–96)
NONHDLC SERPL-MCNC: 161 MG/DL
PLATELET # BLD AUTO: 229.6 10^3/UL (ref 134–434)
PLATELET BLD QL SMEAR: ADEQUATE
PMV BLD: 8.3 FL (ref 7.5–11.1)
POTASSIUM SERPLBLD-SCNC: 4.5 MMOL/L (ref 3.5–5.1)
PROT SERPL-MCNC: 6.6 G/DL (ref 6.4–8.2)
RBC # BLD AUTO: 5.29 10^6/UL (ref 4–5.6)
SODIUM SERPL-SCNC: 128 MMOL/L (ref 136–145)
TRIGL SERPL-MCNC: 234 MG/DL
WBC # BLD AUTO: 4.4 10^3/UL (ref 4–10.8)

## 2024-06-07 NOTE — TELEPHONE ENCOUNTER
Spoke with patient. Pt confirms he's been taking Crestor 40 mg daily and has not been missing doses. Pt also states he has not changed anything with his diet.     Will route to . Orders for follow up 10/2024.         ----- Message -----  From: Amrit Soliz MD  Sent: 6/7/2024  12:35 PM CDT  To: Jaclyn Barger RN    Please confirm that he has been compliant with rosuvastatin.  Lipids not at goal, it is possible that he was at such a high starting point, this is the lowest it will get with rosuvastatin.  May need to discuss PCSK9 on follow-up.    Chilo

## 2024-06-08 VITALS — HEART RATE: 77 BPM

## 2024-06-08 LAB
ALBUMIN SERPL-MCNC: 3 G/DL (ref 3.4–5)
ALP SERPL-CCNC: 112 U/L (ref 45–117)
ALT SERPL-CCNC: 117 U/L (ref 13–61)
ANION GAP SERPL CALC-SCNC: 4 MMOL/L (ref 4–13)
APPEARANCE UR: CLEAR
AST SERPL-CCNC: 58 U/L (ref 15–37)
BASE EXCESS BLDV CALC-SCNC: -0.4 MMOL/L (ref -2–2)
BASE EXCESS BLDV CALC-SCNC: 1.7 MMOL/L (ref -2–2)
BILIRUB SERPL-MCNC: 0.5 MG/DL (ref 0.2–1)
BILIRUB UR STRIP.AUTO-MCNC: NEGATIVE MG/DL
BUN SERPL-MCNC: 14.6 MG/DL (ref 7–18)
CALCIUM SERPL-MCNC: 8.2 MG/DL (ref 8.5–10.1)
CHLORIDE SERPL-SCNC: 107 MMOL/L (ref 98–107)
CO2 SERPL-SCNC: 27 MMOL/L (ref 21–32)
COLOR UR: YELLOW
CREAT SERPL-MCNC: 0.7 MG/DL (ref 0.55–1.3)
GLUCOSE SERPL-MCNC: 173 MG/DL (ref 74–106)
HCT VFR BLDV CALC: 40 % (ref 35.4–49)
HCT VFR BLDV CALC: 50 % (ref 35.4–49)
KETONES UR QL STRIP: NEGATIVE
LEUKOCYTE ESTERASE UR QL STRIP.AUTO: NEGATIVE
NITRITE UR QL STRIP: NEGATIVE
PCO2 BLDV: 59.2 MMHG (ref 38–52)
PCO2 BLDV: 59.4 MMHG (ref 38–52)
PH BLDV: 7.28 [PH] (ref 7.31–7.41)
PH BLDV: 7.32 [PH] (ref 7.31–7.41)
PH UR: 6 [PH] (ref 5–8)
POTASSIUM SERPLBLD-SCNC: 4.3 MMOL/L (ref 3.5–5.1)
PROT SERPL-MCNC: 5.9 G/DL (ref 6.4–8.2)
PROT UR QL STRIP: (no result)
PROT UR QL STRIP: NEGATIVE
SAO2 % BLDV: 24.6 % (ref 70–80)
SAO2 % BLDV: 43.4 % (ref 70–80)
SODIUM SERPL-SCNC: 139 MMOL/L (ref 136–145)
SP GR UR: 1.03 (ref 1.01–1.03)
UROBILINOGEN UR STRIP-MCNC: 0.2 MG/DL (ref 0.2–1)

## 2024-06-08 PROCEDURE — 3E033GC INTRODUCTION OF OTHER THERAPEUTIC SUBSTANCE INTO PERIPHERAL VEIN, PERCUTANEOUS APPROACH: ICD-10-PCS

## 2024-06-08 PROCEDURE — 3E0337Z INTRODUCTION OF ELECTROLYTIC AND WATER BALANCE SUBSTANCE INTO PERIPHERAL VEIN, PERCUTANEOUS APPROACH: ICD-10-PCS

## 2024-06-08 RX ADMIN — SODIUM CHLORIDE STA MLS/HR: 9 INJECTION, SOLUTION INTRAVENOUS at 02:46

## 2024-06-08 RX ADMIN — SODIUM CHLORIDE SCH MLS/HR: 9 INJECTION, SOLUTION INTRAVENOUS at 01:07

## 2024-06-08 RX ADMIN — SODIUM CHLORIDE STA MLS/HR: 9 INJECTION, SOLUTION INTRAVENOUS at 02:10

## 2024-06-08 RX ADMIN — HUMAN INSULIN ONE UNITS: 100 INJECTION, SOLUTION SUBCUTANEOUS at 00:31

## 2024-07-19 ENCOUNTER — OFFICE VISIT (OUTPATIENT)
Dept: FAMILY MEDICINE | Facility: CLINIC | Age: 64
End: 2024-07-19
Attending: FAMILY MEDICINE
Payer: COMMERCIAL

## 2024-07-19 VITALS
HEART RATE: 71 BPM | OXYGEN SATURATION: 96 % | TEMPERATURE: 98.3 F | RESPIRATION RATE: 13 BRPM | HEIGHT: 65 IN | WEIGHT: 184.5 LBS | BODY MASS INDEX: 30.74 KG/M2 | SYSTOLIC BLOOD PRESSURE: 129 MMHG | DIASTOLIC BLOOD PRESSURE: 76 MMHG

## 2024-07-19 DIAGNOSIS — R80.9 TYPE 2 DIABETES MELLITUS WITH MICROALBUMINURIA, WITHOUT LONG-TERM CURRENT USE OF INSULIN (H): Primary | ICD-10-CM

## 2024-07-19 DIAGNOSIS — E11.29 TYPE 2 DIABETES MELLITUS WITH MICROALBUMINURIA, WITHOUT LONG-TERM CURRENT USE OF INSULIN (H): Primary | ICD-10-CM

## 2024-07-19 DIAGNOSIS — F17.200 TOBACCO USE DISORDER: ICD-10-CM

## 2024-07-19 LAB
HBA1C MFR BLD: 6.8 % (ref 0–5.6)
HOLD SPECIMEN: NORMAL
HOLD SPECIMEN: NORMAL

## 2024-07-19 PROCEDURE — 83036 HEMOGLOBIN GLYCOSYLATED A1C: CPT | Performed by: FAMILY MEDICINE

## 2024-07-19 PROCEDURE — 36415 COLL VENOUS BLD VENIPUNCTURE: CPT | Performed by: FAMILY MEDICINE

## 2024-07-19 PROCEDURE — 99214 OFFICE O/P EST MOD 30 MIN: CPT | Performed by: FAMILY MEDICINE

## 2024-07-19 ASSESSMENT — PAIN SCALES - GENERAL: PAINLEVEL: NO PAIN (0)

## 2024-07-19 NOTE — PROGRESS NOTES
Assessment and Plan    (E11.29,  R80.9) Type 2 diabetes mellitus with microalbuminuria, without long-term current use of insulin (H)  (primary encounter diagnosis)  Comment: At goal, has meds   Plan: Hemoglobin A1c            (F17.200) Tobacco use disorder  Comment: making progress, just a few cigarettes a day.   Plan: Advised that he may add in OTC nicotine replacement - lozenge, gum       RTC in 6m    Tl Ortiz MD     Kelley De La Cruz is a 63 year old, presenting for the following health issues:  Recheck Medication        7/19/2024     1:23 PM   Additional Questions   Roomed by MR   Accompanied by NA         7/19/2024     1:23 PM   Patient Reported Additional Medications   Patient reports taking the following new medications NA     History of Present Illness       Diabetes:   He presents for follow up of diabetes.    He is not checking blood glucose.         He has no concerns regarding his diabetes at this time.   He is not experiencing numbness or burning in feet, excessive thirst, blurry vision, weight changes or redness, sores or blisters on feet.             Hyperlipidemia Follow-Up    Are you regularly taking any medication or supplement to lower your cholesterol?   Yes- Rosuvastatin  Are you having muscle aches or other side effects that you think could be caused by your cholesterol lowering medication?  No    Hypertension Follow-up    Do you check your blood pressure regularly outside of the clinic? Yes   Are you following a low salt diet? Yes  Are your blood pressures ever more than 140 on the top number (systolic) OR more   than 90 on the bottom number (diastolic), for example 140/90? Not since last visit      Medication Followup of Bupropion 150 mg  Taking Medication as prescribed: yes  Side Effects:  None  Medication Helping Symptoms:  yes    Smoking cessation is doing okay. He will have some instances of smoking every couple days but is trying his best to quit.  Is also using patch, but not  "every day.    Will be enrolling in a new choleterol medication study.        Objective    /76 (BP Location: Right arm, Patient Position: Sitting, Cuff Size: Adult Large)   Pulse 71   Temp 98.3  F (36.8  C) (Skin)   Resp 13   Ht 1.651 m (5' 5\")   Wt 83.7 kg (184 lb 8 oz)   SpO2 96%   BMI 30.70 kg/m    Body mass index is 30.7 kg/m .  Physical Exam  Vitals and nursing note reviewed.   HENT:      Head: Normocephalic.   Eyes:      Conjunctiva/sclera: Conjunctivae normal.   Cardiovascular:      Rate and Rhythm: Normal rate and regular rhythm.      Heart sounds: Normal heart sounds.   Pulmonary:      Effort: Pulmonary effort is normal.      Breath sounds: Normal breath sounds.   Skin:     General: Skin is warm and dry.   Neurological:      General: No focal deficit present.      Mental Status: He is alert and oriented to person, place, and time.   Psychiatric:         Mood and Affect: Mood normal.         Behavior: Behavior normal.            Signed Electronically by: Tl Ortiz MD    "

## 2024-07-29 ENCOUNTER — OFFICE VISIT (OUTPATIENT)
Dept: CARDIOLOGY | Facility: CLINIC | Age: 64
End: 2024-07-29
Payer: COMMERCIAL

## 2024-07-29 VITALS
BODY MASS INDEX: 30.16 KG/M2 | HEIGHT: 65 IN | DIASTOLIC BLOOD PRESSURE: 76 MMHG | RESPIRATION RATE: 15 BRPM | SYSTOLIC BLOOD PRESSURE: 129 MMHG | HEART RATE: 69 BPM | WEIGHT: 181 LBS

## 2024-07-29 DIAGNOSIS — I25.10 CORONARY ARTERY DISEASE INVOLVING NATIVE CORONARY ARTERY OF NATIVE HEART, UNSPECIFIED WHETHER ANGINA PRESENT: ICD-10-CM

## 2024-07-29 DIAGNOSIS — E78.5 DYSLIPIDEMIA: ICD-10-CM

## 2024-07-29 DIAGNOSIS — Z00.6 EXAMINATION OF PARTICIPANT OR CONTROL IN CLINICAL RESEARCH: Primary | ICD-10-CM

## 2024-07-29 PROCEDURE — 84999 UNLISTED CHEMISTRY PROCEDURE: CPT

## 2024-07-29 PROCEDURE — 99207 PR NO CHARGE NURSE ONLY: CPT

## 2024-07-29 PROCEDURE — 36415 COLL VENOUS BLD VENIPUNCTURE: CPT

## 2024-07-29 NOTE — PROGRESS NOTES
Study Name: A Phase 3 Randomized, Placebo-Controlled Clinical Study to Evaluate the Efficacy and Safety of MK-0616 in Reducing Major Adverse Cardiovascular Events in   Participants at High Cardiovascular Risk    : Twan Nieves MD  Protocol Version: 015-05, [id number]      Patient seen for an education visit to continue discussion regarding her participation in the MK-0616 trial.    The participant was interviewed on the phone to determine his  level of interest in participating in a clinical research trial.   The study was introduced as well as the length of study participation, frequency of study visits, delivery method of study drug, risks of study drug, and voluntary nature of participation.    Participant presents today to the clinic to continue the consent process.   The MK-0616 trial was reviewed with the participant  This included:  Study purpose   Placebo control  Double-blind  Qualifiers for study participation   Study drug   Study procedures  Length of study participation   Risks   Benefits (if any)   Confidentiality   Payment (if any)   Alternatives to participation  Voluntary participation   Patient s questions were answered to his satisfaction.      The consent form (version [id number]) was given to the participant for his review.     After review of the optional screening visit consent and the main study consent, he agreed to participate in the optional screening of the [employer] 0616 study.    Participant Identification Card provided to participant    Participant registered via IRT. Assigned unique identification number: [id number]    LDL-C drawn, results pending    Adverse events solicited  [x] Yes  [] No   If AE reported, see below for AE details    Dago Dangelo will be updated with results when available, and he will decide if he would like to continue to participate in the main study, or if he would prefer to decline the invitation.     Plan: pending results of LDL-C and  scheduling next clinic visit at [facility]     Sara Behmanesh  Clinical Research Coordinator

## 2024-07-29 NOTE — LETTER
7/29/2024    Tl Ortiz [place]  [address] [facility] [address]    RE: Dago Dangelo       Dear Colleague,     [facility] the [facility].    Study Name: A Phase 3 Randomized, Placebo-Controlled Clinical Study to Evaluate the Efficacy and Safety of MK-0616 in Reducing Major Adverse Cardiovascular Events in   Participants at High Cardiovascular Risk    : Twan Nieves MD  Protocol Version: 015-05, [id number]      Patient seen for an education visit to continue discussion regarding her participation in the MK-0616 trial.    The participant was interviewed on the phone to determine his  level of interest in participating in a clinical research trial.   The study was introduced as well as the length of study participation, frequency of study visits, delivery method of study drug, risks of study drug, and voluntary nature of participation.    Participant presents today to the clinic to continue the consent process.   The MK-0616 trial was reviewed with the participant  This included:  Study purpose   Placebo control  Double-blind  Qualifiers for study participation   Study drug   Study procedures  Length of study participation   Risks   Benefits (if any)   Confidentiality   Payment (if any)   Alternatives to participation  Voluntary participation   Patient s questions were answered to his satisfaction.      The consent form (version [id number]) was given to the participant for his review.     After review of the optional screening visit consent and the main study consent, he agreed to participate in the optional screening of the [employer] 0616 study.    Participant Identification Card provided to participant    Participant registered via IRT. Assigned unique identification number: [id number]    LDL-C drawn, results pending    Adverse events solicited  [x] Yes  [] No   If AE reported, see below for AE details    Dago Dangelo will be  updated with results when available, and he will decide if he would like to continue to participate in the main study, or if he would prefer to decline the invitation.     Plan: pending results of LDL-C and scheduling next clinic visit at [facility]     Sara Behmanesh  Clinical Research Coordinator     Thank you for allowing me to participate in the care of your patient.    [facility]  cc:   Tl Ortiz MD  [address]  [place] [address]

## 2024-08-05 ENCOUNTER — ALLIED HEALTH/NURSE VISIT (OUTPATIENT)
Dept: CARDIOLOGY | Facility: CLINIC | Age: 64
End: 2024-08-05
Payer: COMMERCIAL

## 2024-08-05 DIAGNOSIS — Z00.6 EXAMINATION OF PARTICIPANT OR CONTROL IN CLINICAL RESEARCH: Primary | ICD-10-CM

## 2024-08-05 PROCEDURE — 99207 PR NO CHARGE NURSE ONLY: CPT

## 2024-08-05 NOTE — PROGRESS NOTES
Study Name: A Phase 3 Randomized, Placebo-Controlled Clinical Study to Evaluate the Efficacy and Safety of MK-0616 in Reducing Major Adverse Cardiovascular Events in Participants at High Cardiovascular Risk    : Twan Nieves MD  Protocol Version: 015-05, [id number]    MEDICAL HISTORY SCREENING NOTE    Demographic Info  Dago Dangelo   1960          63 year old  male    Primary Diagnosis   Primary Diagnosis - Hypercholesterolemia (Date): 12/28/2015  Secondary Diagnosis - Atherosclerotic cardiovascular disease (Date):12/21/2015    Medical History:     Past Medical History:   Diagnosis Date    Arthritis 06/01/1984 - ongoing    CAD (coronary artery disease) -multivessel diffuse disease, Promus KIARRA x2, OM 1, CFX 12/21/2015 - ongoing    S/p angio w/stent  12/21/2015     Diabetes mellitus (H) Type II, without long term use of insulin 4/13/2016 - ongoing    HTN (hypertension) 12/28/2015 - ongoing    Hyperlipidemia 12/28/2015 - ongoing    NSTEMI (non-ST elevated myocardial infarction) (H) 1/4/2016      Chronic Kidney Disease (Stage I) 5/12/2022 - ongoing        Medical History Condition or Prior Procedure Does Subject Have a History of this Condition or Procedure? When Did the Medical History Condition or Procedure Start?  (Date) Is the Medical History Condition Still Ongoing? Category   1.a Myocardial infarction Yes 1/4/2016 No Cardiovascular    1.b Ischaemic stroke No NA NA Cardiovascular    1.c Peripheral revascularization    He underwent revascularization of a left circumflex and OM branch that was severely occluded  No NA No Cardiovascular    1.d Limb amputation No NA NA Cardiovascular    1.e Multiple vessel coronary artery disease Yes 2/21/2015 Yes Cardiovascular    1.f Triple vessel disease No NA NA Cardiovascular    1.g Double vessel disease No NA NA Cardiovascular    1.h Single vessel disease No NA NA Cardiovascular    1.i Coronary calcium score high No NA NA Cardiovascular    1.j  Transient ischemic attack (TIA) No NA NA Cardiovascular    1.k Carotid artery stenosis No NA NA Cardiovascular    1.l Peripheral artery stenosis No NA NA Cardiovascular    1.m Claudication No NA NA Cardiovascular    1.n Type 1 diabetes mellitus No NA NA Cardiovascular    1.o Type 2 diabetes mellitus Yes 4/13/2016 Yes Cardiovascular    1.p Hypertension  Yes 12/28/2015  Yes Cardiovascular    1.q Heterozygous familial hypercholesterolaemia No NA NA Cardiovascular      Social History     Tobacco Use    Smoking status: Every Day     Current packs/day: 1.00     Average packs/day: 1 pack/day for 30.0 years (30.0 ttl pk-yrs)     Types: Cigarettes    Smokeless tobacco: Never    Tobacco comments:     may have one once weekly   Vaping Use    Vaping status: Never Used   Substance Use Topics    Alcohol use: Yes     Alcohol/week: 6.0 standard drinks of alcohol     Types: 6 Cans of beer per week    Drug use: No       Medications:    Current Outpatient Medications:     aspirin (ASPIRIN LOW DOSE) 81 MG EC tablet, TAKE 1 TABLET BY MOUTH EVERY DAY, Disp: 100 tablet, Rfl: 3 (12/18/15 - ongoing)    buPROPion (ZYBAN) 150 MG 12 hr tablet, Take 1 tablet (150 mg) by mouth 2 times daily, Disp: 180 tablet, Rfl: 1 (03/02/23 - ongoing)    carvedilol (COREG) 25 MG tablet, TAKE 1 TABLET BY MOUTH TWICE A DAY WITH MEALS, Disp: 180 tablet, Rfl: 1 (12/18/15 - ongoing)    chlorthalidone (HYGROTON) 25 MG tablet, TAKE 1 TABLET BY MOUTH EVERY MORNING, Disp: 90 tablet, Rfl: 0 (06/08/21 - ongoing)    empagliflozin (JARDIANCE) 25 MG TABS tablet, Take 1 tablet (25 mg) by mouth daily, Disp: 90 tablet, Rfl: 1 (04/19/24 - ongoing)    gemfibrozil (LOPID) 600 MG tablet, TAKE 1 TABLET BY MOUTH 2 TIMES DAILY, Disp: 180 tablet, Rfl: 3 (02/20/18 - ongoing)    losartan (COZAAR) 100 MG tablet, TAKE 1 TABLET BY MOUTH EVERY DAY, Disp: 90 tablet, Rfl: 1 ( 01/24/17- ongoing)    metFORMIN (GLUCOPHAGE) 500 MG tablet, TAKE 2 TABLETS BY MOUTH 2 TIMES DAILY WITH MEALS, Disp:  360 tablet, Rfl: 1 (12/18/15 - ongoing)    nitroGLYcerin (NITROSTAT) 0.4 MG sublingual tablet, Place 1 tablet (0.4 mg) under the tongue every 5 minutes as needed for chest pain if you are still having symptoms after 3 doses (15 minutes) call 911., Disp: 25 tablet, Rfl: 1 (12/18/15 - ongoing)    rosuvastatin (CRESTOR) 40 MG tablet, Take 1 tablet (40 mg) by mouth daily, Disp: 90 tablet, Rfl: 3 (10/05/23 - ongoing)    Electronic medical record chart review was conducted by clinical research coordinator     Sara Behmanesh  Clinical Research Coordinator

## 2024-08-06 ENCOUNTER — TELEPHONE (OUTPATIENT)
Dept: CARDIOLOGY | Facility: CLINIC | Age: 64
End: 2024-08-06
Payer: COMMERCIAL

## 2024-08-06 DIAGNOSIS — I25.10 CORONARY ARTERY DISEASE INVOLVING NATIVE CORONARY ARTERY OF NATIVE HEART, UNSPECIFIED WHETHER ANGINA PRESENT: ICD-10-CM

## 2024-08-06 DIAGNOSIS — Z00.6 EXAMINATION OF PARTICIPANT OR CONTROL IN CLINICAL RESEARCH: Primary | ICD-10-CM

## 2024-08-06 PROCEDURE — 99207 PR NO CHARGE-RESEARCH SERVICE: CPT

## 2024-08-06 NOTE — TELEPHONE ENCOUNTER
Spoke with Dago Dangelo regarding request to move his appointment on Thursday 8/8/24 to 1100.  He agrees to this change.  No further questions or concerns at this time.    Sonali Lares, RN, BSN  Clinical Trials Nurse

## 2024-08-08 ENCOUNTER — OFFICE VISIT (OUTPATIENT)
Dept: CARDIOLOGY | Facility: CLINIC | Age: 64
End: 2024-08-08
Payer: COMMERCIAL

## 2024-08-08 VITALS
DIASTOLIC BLOOD PRESSURE: 64 MMHG | WEIGHT: 181 LBS | HEART RATE: 76 BPM | HEIGHT: 66 IN | BODY MASS INDEX: 29.09 KG/M2 | SYSTOLIC BLOOD PRESSURE: 116 MMHG | RESPIRATION RATE: 16 BRPM

## 2024-08-08 VITALS
SYSTOLIC BLOOD PRESSURE: 116 MMHG | HEART RATE: 76 BPM | BODY MASS INDEX: 30.12 KG/M2 | RESPIRATION RATE: 16 BRPM | WEIGHT: 181 LBS | DIASTOLIC BLOOD PRESSURE: 64 MMHG

## 2024-08-08 DIAGNOSIS — E11.29 TYPE 2 DIABETES MELLITUS WITH MICROALBUMINURIA, WITHOUT LONG-TERM CURRENT USE OF INSULIN (H): ICD-10-CM

## 2024-08-08 DIAGNOSIS — E78.5 HYPERLIPIDEMIA LDL GOAL <70: ICD-10-CM

## 2024-08-08 DIAGNOSIS — I25.83 CORONARY ARTERIOSCLEROSIS DUE TO LIPID RICH PLAQUE: Primary | ICD-10-CM

## 2024-08-08 DIAGNOSIS — E66.811 CLASS 1 OBESITY DUE TO EXCESS CALORIES WITHOUT SERIOUS COMORBIDITY WITH BODY MASS INDEX (BMI) OF 30.0 TO 30.9 IN ADULT: ICD-10-CM

## 2024-08-08 DIAGNOSIS — F17.200 TOBACCO USE DISORDER: ICD-10-CM

## 2024-08-08 DIAGNOSIS — E66.09 CLASS 1 OBESITY DUE TO EXCESS CALORIES WITHOUT SERIOUS COMORBIDITY WITH BODY MASS INDEX (BMI) OF 30.0 TO 30.9 IN ADULT: ICD-10-CM

## 2024-08-08 DIAGNOSIS — Z00.6 EXAMINATION OF PARTICIPANT OR CONTROL IN CLINICAL RESEARCH: Primary | ICD-10-CM

## 2024-08-08 DIAGNOSIS — I21.4 NSTEMI (NON-ST ELEVATED MYOCARDIAL INFARCTION) (H): ICD-10-CM

## 2024-08-08 DIAGNOSIS — R80.9 TYPE 2 DIABETES MELLITUS WITH MICROALBUMINURIA, WITHOUT LONG-TERM CURRENT USE OF INSULIN (H): ICD-10-CM

## 2024-08-08 DIAGNOSIS — I10 BENIGN ESSENTIAL HYPERTENSION: ICD-10-CM

## 2024-08-08 PROBLEM — N18.1 CHRONIC KIDNEY DISEASE, STAGE 1: Status: RESOLVED | Noted: 2022-05-12 | Resolved: 2024-08-08

## 2024-08-08 LAB
ATRIAL RATE - MUSE: 74 BPM
DIASTOLIC BLOOD PRESSURE - MUSE: NORMAL MMHG
INTERPRETATION ECG - MUSE: NORMAL
P AXIS - MUSE: 54 DEGREES
PR INTERVAL - MUSE: 168 MS
QRS DURATION - MUSE: 88 MS
QT - MUSE: 380 MS
QTC - MUSE: 421 MS
R AXIS - MUSE: 36 DEGREES
SYSTOLIC BLOOD PRESSURE - MUSE: NORMAL MMHG
T AXIS - MUSE: 70 DEGREES
VENTRICULAR RATE- MUSE: 74 BPM

## 2024-08-08 PROCEDURE — 99207 PR NO CHARGE-RESEARCH SERVICE: CPT | Performed by: INTERNAL MEDICINE

## 2024-08-08 PROCEDURE — G2211 COMPLEX E/M VISIT ADD ON: HCPCS | Performed by: INTERNAL MEDICINE

## 2024-08-08 PROCEDURE — 36415 COLL VENOUS BLD VENIPUNCTURE: CPT | Performed by: INTERNAL MEDICINE

## 2024-08-08 PROCEDURE — 93000 ELECTROCARDIOGRAM COMPLETE: CPT | Performed by: INTERNAL MEDICINE

## 2024-08-08 PROCEDURE — 99214 OFFICE O/P EST MOD 30 MIN: CPT | Performed by: INTERNAL MEDICINE

## 2024-08-08 PROCEDURE — 84999 UNLISTED CHEMISTRY PROCEDURE: CPT | Performed by: INTERNAL MEDICINE

## 2024-08-08 NOTE — PROGRESS NOTES
[facility]  Heart Care Clinic Follow-up Note    Assessment & Plan        (I25.10,  I25.83) Coronary arteriosclerosis due to lipid rich plaque  (primary encounter diagnosis)  Comment: Angiography secondary to positive enzymes December 2015 showed left main 35 to 40% stenosis, LAD with 30 to 40% stenosis in first diagonal less than 30% stenosis, circumflex dominant with distal diffuse 70 to 75% stenosis and a PDA 40 to 50% stenosis but the first OM with a 99% lesion which received a Promus 2.25 x 12 mm stent as well as a 2.5 x 16 mm distal circumflex stent.  The right coronary artery was nondominant with a 90% narrowing.  He is currently asymptomatic and work on prevention.    (I21.4) NSTEMI (non-ST elevated myocardial infarction) (H)  Comment: This was a posterolateral non-ST segment elevation MI due to OM disease.  Stable.    (I10) Benign essential hypertension  Comment: Would consider resistant controlled on Coreg, chlorthalidone, losartan.  Under good control.    (E78.5) Hyperlipidemia LDL goal <70  Comment: Primary, under fair control, total cholesterol 203 with an , would like to see these less than 200 and 70 respectively.    (F17.200) Tobacco use disorder  Comment: He knows he needs to discontinue.    (E11.29,  R80.9) Type 2 diabetes mellitus with microalbuminuria, without long-term current use of insulin (H)  Comment: So noted on Jardiance as well as metformin with hemoglobin A1c of 6.8.    (E66.09,  Z68.30) Class 1 obesity due to excess calories without serious comorbidity with body mass index (BMI) of 30.0 to 30.9 in adult  Comment: Needs to work on weight loss.    Plan  1.  Stop smoking.  2.  Work on weight loss.  3.  Consider randomization into the [place] study looking at oral PCSK9 inhibitor.  4.  Follow-up per the study.    The longitudinal plan of care for the diagnosis(es)/condition(s) as documented were addressed during this visit. Due to the added complexity in care, I will  continue to support Jono in the subsequent management and with ongoing continuity of care.     Subjective  CC: 63-year-old white gentleman being seen in possible randomization into the [place] study comparing placebo to oral PCSK9 inhibitor.  If he does push himself he will get short of breath such as running but otherwise denies any fatigue, syncope, dizziness, chest pain, palpitations, significant shortness of breath, PND, cough.  Peripheral edema.    Medications  Current Outpatient Medications   Medication Sig Dispense Refill    aspirin (ASPIRIN LOW DOSE) 81 MG EC tablet TAKE 1 TABLET BY MOUTH EVERY  tablet 3    buPROPion (ZYBAN) 150 MG 12 hr tablet Take 1 tablet (150 mg) by mouth 2 times daily 180 tablet 1    carvedilol (COREG) 25 MG tablet TAKE 1 TABLET BY MOUTH TWICE A DAY WITH MEALS 180 tablet 1    chlorthalidone (HYGROTON) 25 MG tablet TAKE 1 TABLET BY MOUTH EVERY MORNING 90 tablet 0    empagliflozin (JARDIANCE) 25 MG TABS tablet Take 1 tablet (25 mg) by mouth daily 90 tablet 1    gemfibrozil (LOPID) 600 MG tablet TAKE 1 TABLET BY MOUTH 2 TIMES DAILY 180 tablet 3    ibuprofen (ADVIL/MOTRIN) 600 MG tablet Take 1 tablet (600 mg) by mouth every 6 hours as needed for moderate pain 20 tablet 0    losartan (COZAAR) 100 MG tablet TAKE 1 TABLET BY MOUTH EVERY DAY 90 tablet 1    metFORMIN (GLUCOPHAGE) 500 MG tablet TAKE 2 TABLETS BY MOUTH 2 TIMES DAILY WITH MEALS 360 tablet 1    multivitamin, therapeutic with minerals (THERA-VIT-M) TABS Take 1 tablet by mouth every evening      rosuvastatin (CRESTOR) 40 MG tablet Take 1 tablet (40 mg) by mouth daily 90 tablet 3    nitroGLYcerin (NITROSTAT) 0.4 MG sublingual tablet Place 1 tablet (0.4 mg) under the tongue every 5 minutes as needed for chest pain if you are still having symptoms after 3 doses (15 minutes) call 911. 25 tablet 1       Objective  /64 (BP Location: Right arm, Patient Position: Sitting, Cuff Size: Adult Regular)   Pulse 76   Resp 16   Wt  "82.1 kg (181 lb)   BMI 30.12 kg/m      General Appearance:    Alert, cooperative, no distress, appears stated age   Head:    Normocephalic, without obvious abnormality, atraumatic   Throat:   Lips, mucosa, and tongue normal; teeth and gums normal   Neck:   Supple, symmetrical, trachea midline, no adenopathy;        thyroid:  No enlargement/tenderness/nodules; no carotid    bruit or JVD   Back:     Symmetric, no curvature, ROM normal, no CVA tenderness   Lungs:     Clear to auscultation bilaterally, respirations unlabored   Chest wall:    No tenderness or deformity   Heart:    Regular rate and rhythm, S1 and S2 normal, no murmur, rub   or gallop   Abdomen:     Soft, non-tender, bowel sounds active all four quadrants,     no masses, no organomegaly   Extremities:   Normal, atraumatic, no cyanosis or edema   Pulses:   2+ and symmetric all extremities   Skin:   Skin color, texture, turgor normal, no rashes or lesions     Results    Lab Results personally reviewed   Lab Results   Component Value Date    CHOL 203 (H) 06/06/2024    CHOL 174 12/01/2023     Lab Results   Component Value Date    HDL 42 06/06/2024    HDL 40 12/01/2023     No components found for: \"LDLCALC\"  Lab Results   Component Value Date    TRIG 234 (H) 06/06/2024    TRIG 302 (H) 12/01/2023     Lab Results   Component Value Date    WBC 11.9 (H) 08/25/2023    HGB 14.8 08/25/2023    HCT 43.5 08/25/2023     08/25/2023     Lab Results   Component Value Date    BUN 14.8 09/07/2023     09/07/2023    CO2 20 (L) 09/07/2023       Reviewed electrocardiogram pending          "

## 2024-08-08 NOTE — PROGRESS NOTES
"   Study Name: A Phase 3 Randomized, Placebo-Controlled Clinical Study to Evaluate the Efficacy and Safety of MK-0616 in Reducing Major Adverse Cardiovascular Events in   Participants at High Cardiovascular Risk    : Twan Nieves MD  Protocol Version: 015-05, [id number]    Screening visit    Consent discussion  The participant was interviewed on the phone to determine his level of interest in participating in a clinical research trial.   The study was introduced as well as the length of study participation, frequency of study visits, delivery method of study drug, risks of study drug, and voluntary nature of participation.    Participant presents today to the clinic to continue the consent process. The MK-0616 trial was reviewed with the patient. This included:  Study purpose   Placebo control  Double-blind  Qualifiers for study participation   Study drug   Study procedures  Length of study participation   Risks   Benefits (if any)   Confidentiality   Payment (if any)   Alternatives to participation  Voluntary participation   Future biomedical research    Participant questions were answered to his satisfaction.      The consent form (version 1.0,  [employer] Approval date 07 Feb 2024) was given to participant for his review.     After his review, Dago Dangelo has agreed to participate in the MK-0616 clinical trial.    The consent form was signed, a copy of the signed form was given to him for his records; a copy has been uploaded to his medical record.    Inclusion/exclusion criteria were reviewed and subject did meet criteria pending central lab results.    The screening was entered in to the trial's IRT system. Screening number [id number]    Participant Identification Card given to Dago Dangelo      /64 (BP Location: Right arm, Patient Position: Sitting, Cuff Size: Adult Regular)   Pulse 76   Resp 16   Ht 1.667 m (5' 5.63\")   Wt 82.1 kg (181 lb)   BMI 29.54 kg/m      done after " subject has been seated for more than 5 minutes    Fasting study Labs drawn at 11.24 am(to be analyzed @ central laboratory)   Detail available of copy of lab requisition form      Physical exam done by Dr Nieves  Please refer to their note for full details    ECG done [x] Yes  [] No    Current medications reviewed    [x] Yes  [] No   Updates noted, please see below for details   Current lipid lowering therapy (LLT) reviewed [x] Yes  [] No    Confirmed no changes since last visit    Adverse events solicited  [x] Yes  [] No   If AE reported, see below for AE details  None reported    Plan:   Pending receipt of labs for qualifying and  review of records to confirm eligibility   Will tentatively schedule randomization visit within next 30 days    Dago Dangelo instructed on fasting requirements IF eligible for the study for the next study visit (if qualifies)   he agrees with this plan.    Past Medical History:   Diagnosis Date    Arthritis 06/01/1984    CAD (coronary artery disease) 12/21/2015    multivessel diffuse disease, Promus KIARRA x2, OM 1, CFX    Diabetes mellitus (H)     HTN (hypertension)     Hyperlipidemia     NSTEMI (non-ST elevated myocardial infarction) (H)     Obese     Tobacco dependence        Past Surgical History:   Procedure Laterality Date    COLONOSCOPY      REPAIR LIGAMENT ULNAR COLLATERAL (ELBOW) Right 2/1/2023    Procedure: EXAM UNDER ANESTHESIA RIGHT ELBOW,  REPAIR LATERAL ULNAR COLLATERAL LIGAMENT;  Surgeon: Daniel sOman MD;  Location: [facility] OR    STENT, CORONARY, JENNIFER  2015    KIARRA=OM1, Mid L Cx       No Known Allergies      Current Outpatient Medications:     aspirin (ASPIRIN LOW DOSE) 81 MG EC tablet, TAKE 1 TABLET BY MOUTH EVERY DAY, Disp: 100 tablet, Rfl: 3    buPROPion (ZYBAN) 150 MG 12 hr tablet, Take 1 tablet (150 mg) by mouth 2 times daily, Disp: 180 tablet, Rfl: 1    carvedilol (COREG) 25 MG tablet, TAKE 1 TABLET BY MOUTH TWICE A DAY WITH MEALS, Disp: 180 tablet,  Rfl: 1    chlorthalidone (HYGROTON) 25 MG tablet, TAKE 1 TABLET BY MOUTH EVERY MORNING, Disp: 90 tablet, Rfl: 0    empagliflozin (JARDIANCE) 25 MG TABS tablet, Take 1 tablet (25 mg) by mouth daily, Disp: 90 tablet, Rfl: 1    gemfibrozil (LOPID) 600 MG tablet, TAKE 1 TABLET BY MOUTH 2 TIMES DAILY, Disp: 180 tablet, Rfl: 3    ibuprofen (ADVIL/MOTRIN) 600 MG tablet, Take 1 tablet (600 mg) by mouth every 6 hours as needed for moderate pain, Disp: 20 tablet, Rfl: 0    losartan (COZAAR) 100 MG tablet, TAKE 1 TABLET BY MOUTH EVERY DAY, Disp: 90 tablet, Rfl: 1    metFORMIN (GLUCOPHAGE) 500 MG tablet, TAKE 2 TABLETS BY MOUTH 2 TIMES DAILY WITH MEALS, Disp: 360 tablet, Rfl: 1    multivitamin, therapeutic with minerals (THERA-VIT-M) TABS, Take 1 tablet by mouth every evening, Disp: , Rfl:     nitroGLYcerin (NITROSTAT) 0.4 MG sublingual tablet, Place 1 tablet (0.4 mg) under the tongue every 5 minutes as needed for chest pain if you are still having symptoms after 3 doses (15 minutes) call 911., Disp: 25 tablet, Rfl: 1    rosuvastatin (CRESTOR) 40 MG tablet, Take 1 tablet (40 mg) by mouth daily, Disp: 90 tablet, Rfl: 3    Ryley Gilbert RN  Clinical Trials Nurse

## 2024-08-08 NOTE — LETTER
8/8/2024    Tl Ortiz [place]  [id number] [address] [facility] [address]    RE: Dago FENG Antolin       Dear Colleague,     I had the pleasure of seeing Dago FENG Antolin in the [facility].      [facility]  Heart Care Clinic Follow-up Note    Assessment & Plan        (I25.10,  I25.83) Coronary arteriosclerosis due to lipid rich plaque  (primary encounter diagnosis)  Comment: Angiography secondary to positive enzymes December 2015 showed left main 35 to 40% stenosis, LAD with 30 to 40% stenosis in first diagonal less than 30% stenosis, circumflex dominant with distal diffuse 70 to 75% stenosis and a PDA 40 to 50% stenosis but the first OM with a 99% lesion which received a Promus 2.25 x 12 mm stent as well as a 2.5 x 16 mm distal circumflex stent.  The right coronary artery was nondominant with a 90% narrowing.  He is currently asymptomatic and work on prevention.    (I21.4) NSTEMI (non-ST elevated myocardial infarction) (H)  Comment: This was a posterolateral non-ST segment elevation MI due to OM disease.  Stable.    (I10) Benign essential hypertension  Comment: Would consider resistant controlled on Coreg, chlorthalidone, losartan.  Under good control.    (E78.5) Hyperlipidemia LDL goal <70  Comment: Primary, under fair control, total cholesterol 203 with an , would like to see these less than 200 and 70 respectively.    (F17.200) Tobacco use disorder  Comment: He knows he needs to discontinue.    (E11.29,  R80.9) Type 2 diabetes mellitus with microalbuminuria, without long-term current use of insulin (H)  Comment: So noted on Jardiance as well as metformin with hemoglobin A1c of 6.8.    (E66.09,  Z68.30) Class 1 obesity due to excess calories without serious comorbidity with body mass index (BMI) of 30.0 to 30.9 in adult  Comment: Needs to work on weight loss.    Plan  1.  Stop smoking.  2.  Work on weight loss.  3.  Consider randomization into the [facility] [place] study looking at  oral PCSK9 inhibitor.  4.  Follow-up per the study.    The longitudinal plan of care for the diagnosis(es)/condition(s) as documented were addressed during this visit. Due to the added complexity in care, I will continue to support Jono in the subsequent management and with ongoing continuity of care.     Subjective  CC: 63-year-old white gentleman being seen in possible randomization into the [facility] study comparing placebo to oral PCSK9 inhibitor.  If he does push himself he will get short of breath such as running but otherwise denies any fatigue, syncope, dizziness, chest pain, palpitations, significant shortness of breath, PND, cough.  Peripheral edema.    Medications  Current Outpatient Medications   Medication Sig Dispense Refill     aspirin (ASPIRIN LOW DOSE) 81 MG EC tablet TAKE 1 TABLET BY MOUTH EVERY  tablet 3     buPROPion (ZYBAN) 150 MG 12 hr tablet Take 1 tablet (150 mg) by mouth 2 times daily 180 tablet 1     carvedilol (COREG) 25 MG tablet TAKE 1 TABLET BY MOUTH TWICE A DAY WITH MEALS 180 tablet 1     chlorthalidone (HYGROTON) 25 MG tablet TAKE 1 TABLET BY MOUTH EVERY MORNING 90 tablet 0     empagliflozin (JARDIANCE) 25 MG TABS tablet Take 1 tablet (25 mg) by mouth daily 90 tablet 1     gemfibrozil (LOPID) 600 MG tablet TAKE 1 TABLET BY MOUTH 2 TIMES DAILY 180 tablet 3     ibuprofen (ADVIL/MOTRIN) 600 MG tablet Take 1 tablet (600 mg) by mouth every 6 hours as needed for moderate pain 20 tablet 0     losartan (COZAAR) 100 MG tablet TAKE 1 TABLET BY MOUTH EVERY DAY 90 tablet 1     metFORMIN (GLUCOPHAGE) 500 MG tablet TAKE 2 TABLETS BY MOUTH 2 TIMES DAILY WITH MEALS 360 tablet 1     multivitamin, therapeutic with minerals (THERA-VIT-M) TABS Take 1 tablet by mouth every evening       rosuvastatin (CRESTOR) 40 MG tablet Take 1 tablet (40 mg) by mouth daily 90 tablet 3     nitroGLYcerin (NITROSTAT) 0.4 MG sublingual tablet Place 1 tablet (0.4 mg) under the tongue every 5 minutes as needed for  "chest pain if you are still having symptoms after 3 doses (15 minutes) call 911. 25 tablet 1       Objective  /64 (BP Location: Right arm, Patient Position: Sitting, Cuff Size: Adult Regular)   Pulse 76   Resp 16   Wt 82.1 kg (181 lb)   BMI 30.12 kg/m      General Appearance:    Alert, cooperative, no distress, appears stated age   Head:    Normocephalic, without obvious abnormality, atraumatic   Throat:   Lips, mucosa, and tongue normal; teeth and gums normal   Neck:   Supple, symmetrical, trachea midline, no adenopathy;        thyroid:  No enlargement/tenderness/nodules; no carotid    bruit or JVD   Back:     Symmetric, no curvature, ROM normal, no CVA tenderness   Lungs:     Clear to auscultation bilaterally, respirations unlabored   Chest wall:    No tenderness or deformity   Heart:    Regular rate and rhythm, S1 and S2 normal, no murmur, rub   or gallop   Abdomen:     Soft, non-tender, bowel sounds active all four quadrants,     no masses, no organomegaly   Extremities:   Normal, atraumatic, no cyanosis or edema   Pulses:   2+ and symmetric all extremities   Skin:   Skin color, texture, turgor normal, no rashes or lesions     Results    Lab Results personally reviewed   Lab Results   Component Value Date    CHOL 203 (H) 06/06/2024    CHOL 174 12/01/2023     Lab Results   Component Value Date    HDL 42 06/06/2024    HDL 40 12/01/2023     No components found for: \"LDLCALC\"  Lab Results   Component Value Date    TRIG 234 (H) 06/06/2024    TRIG 302 (H) 12/01/2023     Lab Results   Component Value Date    WBC 11.9 (H) 08/25/2023    HGB 14.8 08/25/2023    HCT 43.5 08/25/2023     08/25/2023     Lab Results   Component Value Date    BUN 14.8 09/07/2023     09/07/2023    CO2 20 (L) 09/07/2023       Reviewed electrocardiogram pending              Thank you for allowing me to participate in the care of your patient.      Sincerely,     THO ADAMES MD     [facility] " [facility]  cc:   Jessi Nieves MD  [address], [address]  [place] [address]

## 2024-08-09 ENCOUNTER — TRANSFERRED RECORDS (OUTPATIENT)
Dept: CARDIOLOGY | Facility: CLINIC | Age: 64
End: 2024-08-09
Payer: COMMERCIAL

## 2024-08-12 ENCOUNTER — DOCUMENTATION ONLY (OUTPATIENT)
Dept: CARDIOLOGY | Facility: CLINIC | Age: 64
End: 2024-08-12
Payer: COMMERCIAL

## 2024-08-12 PROCEDURE — 99207 PR NO CHARGE-RESEARCH SERVICE: CPT | Performed by: INTERNAL MEDICINE

## 2024-08-12 NOTE — PROGRESS NOTES
Study Name: A Phase 3 Randomized, Placebo-Controlled Clinical Study to Evaluate the Efficacy and Safety of [id number] in Reducing Major Adverse Cardiovascular Events in   Participants at High Cardiovascular Risk    : Twan Nieves MD  Protocol Version: 015-05, [id number]    Inclusion Criteria  Criteria #   Inclusion Criteria (all must be yes)   Meets the criteria for at least 1 of the following ASCVD event risk categories (A or B):    1 A. History of Major ASCVD Event   Yes    Age ?18 years with a history of a major ASCVD event defined as at least 1 of the   following (i-iii):   i. ?30 days post MI (presumed Type 1 due to plaque rupture or erosion)  ii. ?30 days post ischemic stroke (presumed due to atherosclerosis)  iii. ?30 days post successful peripheral (carotid or lower extremity) arterial revascularization (surgical or endovascular) or major (ankle or above) amputation due to atherosclerosis     B. High Risk for First Major ASCVD Event and Not Meeting Criteria for A  High risk for first major ASCVD event defined as at least 1 of the following (i-v):   Yes    i. Age ?50 years with evidence of CAD   History of multivessel disease (?2 vessels with ?50% stenosis), with or without revascularization, or   Significant unrevascularized single-vessel disease (?50% stenosis of the  left main coronary artery or ?70% stenosis of any other single coronary artery), or    CT coronary artery calcification score ?300 Agatston units in a participant without a coronary revascularization prior to randomization    ii. Age ?50 years with evidence of atherosclerotic cerebrovascular disease   Prior TIA with ?50% ipsilateral carotid stenosis, or   Carotid artery stenosis of ?70% in at least 1 artery or ?50% in both arteries    iii. Age ?50 years with evidence of PAD   ?50% stenosis in an infra-inguinal leg artery (eg, femoral, superficial femoral, popliteal, tibial) due to atherosclerosis, or    Symptoms of  claudication attributed to vascular disease with an ankle brachial index ?0.90 or toe brachial index ?0.70    iv. Age ?60 years with diabetes mellitus and at least one of the following:    Evidence of microvascular disease (eGFR <60 ml/min/1.73 m2 at Visit 1   [Screening] or urine albumin-creatinine ratio ?30 mg/mmol within 6 months before Visit 1 [Screening])   Daily insulin use   Diabetes mellitus duration of ?10 years    v. Age ?60 years with at least 3 of the following risk factors:   History of hypertension and either ?10 year duration or currently taking   ?2 antihypertensive agents   LDL-C ?130 mg/dl (3.37 mmol/l) at Visit 1 (Screening)    Active smoker   Age ?70 years for participant assigned male sex at birth or age ?75 years for participant assigned female sex at birth       2  Has fasted lipid values (evaluated by the Central Laboratory) at Visit 1 (Screening) as follows:     History of major ASCVD Event: LDL-C ?70 mg/dL (1.81 mmol/L) OR non-HDL-C ?100 mg/dL (2.59 mmol/L)   High risk for first major ASCVD Event: LDL-C ?90 mg/dL (2.33 mmol/L) OR  non-HDL-C ?120 mg/dL (3.11 mmol/L)   Yes   3   Is treated with moderate- or high-intensity statin (  nonstatin LLT) per Appendix 8 at Visit 1 (Screening). Yes   4  Is on a stable dose of all background LLTs (including statin and nonstatin agents) for at least 30 days before Visit 1 (Screening) with no medication or dose changes planned during the participation in the study. Yes   5  Is an individual of any sex/gender, who meets the age criteria as defined in Inclusion #1 at the time of providing informed consent. Yes   6  Female Participants:   A participant assigned female sex at birth is eligible to participate if not pregnant or breastfeeding, and at least one of the following conditions applies:   Is not a POCBP  OR   Is a POCBP and:  - Uses an acceptable contraceptive method, or is abstinent from penile-vaginal intercourse as their preferred and usual  lifestyle (abstinent on a long-term and persistent basis), as described in Appendix 5 during the intervention period and for at least 56 days after the last dose of study intervention. The investigator should evaluate the potential for contraceptive method failure (ie, noncompliance, recently initiated) in relationship to the first dose of study intervention. Contraceptive use by POCBPs should be consistent with local regulations regarding the methods of contraception for those participating in clinical studies. If the contraception requirements in the local label for any of the study interventions are more stringent than the requirements above, the local label requirements are to be followed.  - Has a negative highly sensitive pregnancy test (urine or serum) as required by local regulations within 24 hours (for a urine test) or 72 hours (for a serum test) before the first dose of study intervention. If a urine test cannot be confirmed as negative (eg, an ambiguous result), a serum pregnancy test is required. In such cases, the participant must be excluded from participation if the serum pregnancy result is positive.  Additional requirements for pregnancy testing during and after study intervention are in Section 8.3.7.  - Medical history, menstrual history, and recent sexual activity has been reviewed by the investigator to decrease the risk for inclusion of a POCBP with an early undetected pregnancy.   N/A    IF NA: male participant   7  The participant (or legally acceptable representative) has provided documented informed consent/assent for the study. The participant may also provide consent/assent for FBR. However, the participant may participate in the study without participating in FBR.   Yes   8  Is willing and considered able by the investigator to comply with study procedures,  including adherence with study intervention, fasting guidelines (Section 5.3.1), and visit schedule. Yes     Exclusion  "Criteria  Criteria #  -all must be \"no\"    1     Cardiovascular Conditions   Has a history of homozygous FH based on genetic or clinical criteria [СЕРГЕЙ Guerrero, et al 2014], compound heterozygous FH, or double heterozygous FH.    No   2  Has New York Heart Association Class IV heart failure, last known Left Ventricular Ejection Fraction ?25% by any imaging method, or had a Heart Failure hospitalization within 3 months before Visit 1 (Screening).  No   3  Has recurrent ventricular tachycardia within 3 months prior to randomization.  No   4  Has QTc ?500 ms (in the absence of intraventricular conduction delay and/or bundle branch block) based on an ECG performed within 6 months before or at Visit 1 (Screening).  No   5  Has uncontrolled hypertension defined as sitting SBP >160 mm Hg or DBP >100 mm Hg at Visit 2 (Randomization) despite stable antihypertensive treatment.  No   6  Has a planned arterial revascularization procedure.  No   7      Noncardiac Conditions  7. Has a history of nephrotic syndrome.  No   8  Has history of severe renal insufficiency defined as eGFR <30 mL/min/1.73 m2 at Visit 1 (Screening) or has ESRD on dialysis. eGFR will be calculated according to Appendix 2.Note: Refer to Appendix 7 for country-specific requirements  No   9  Has any clinically significant malabsorption condition based on  s assessment (eg, recurrent vomiting, inflammatory bowel disease with ongoing symptoms, chronic intestinal disease accompanied by a disturbance in digestion absorption, or a history of extensive resection of the upper GI tract).  No   10 10. Has laboratory or clinical evidence of clinically significant hepatic conditions, including 1 or more of the following:   ALT or AST ?3x ULN at Visit 1 (Screening).   Direct bilirubin >2x ULN at Visit 1 (Screening).   Active severe hepatitis or end stage liver disease (eg, hepatic encephalopathy) that in the opinion of the investigator, may increase " the risk associated with study participation or administration of study intervention.  No   11  Has a known allergy or intolerance to any of the ingredients in the study intervention.  No   12  Is actively receiving chemotherapy for malignancy or has a history of malignancy ?3 years before Visit 1 (Screening), except for adequately treated basal cell or squamous cell skin cancer or in situ cervical cancer, which have no timeframe limitations relative to Visit 1 (Screening)  No   13 In the opinion of the investigator has projected longevity of <5 years  No   14 Prior/Concomitant Therapy  14. Is undergoing or previously underwent an LDL-C apheresis program within 3 months before Visit 1 (Screening) or plans to initiate an LDL-C apheresis program.   No   15  Meets 1 or more of the following criteria:   Is on treatment with a PCSK9i (siRNA or mAb), an ANGPTL3 inhibitor, or an MTP inhibitor (eg, lomitapide) at Visit 1 (Screening) or such treatment is planned.   Was previously treated with an siRNA PCSK9i within 18 months before Visit 1 (Screening).   Was previously treated with a mAb PCSK9i within 3 months before Visit 1 (Screening).   Was previously treated with an ANGPTL3 inhibitor within 6 months before Visit 1 (Screening).   Was previously treated with an MTP inhibitor (eg, lomitapide) within 1 month before Visit 1 (Screening).   Prior/Concurrent Clinical Study Experience  No   16  Is currently participating in or plans to participate in an interventional clinical study within 3 months (or 5 half-lives for agents in the previous study, whichever is longer) before Visit 1.   No   17       Diagnostic Assessments  Has a fasting triglyceride value ?400 mg/dL (?4.52 mmol/L) at Visit 1 (Screening)  No   18      Other Exclusions   Routinely consumes >3 alcoholic drinks per day.   One standard drink is defined as any beverage containing 14 g of pure alcohol (ie, 12 oz [355 ml] of beer, 8 to 9 oz [237 to 266 ml] of malt  liquor, 5 oz of wine [148 ml], 1.5 oz [44 ml] of distilled spirits).  No   19  Has a recent history of drug abuse (within the last year) or is a current user of illicit drugs at the time of Visit 1 (Screening).  No   20  Has a medical disorder, condition, or history thereof that in the opinion of the investigator would impair the participant s ability to participate in or complete the study.  No   21  Is or has an immediate family member (eg, spouse, parent/legal guardian, sibling, or child) who is investigational site or Sponsor staff directly involved with this study   No     Per Dr. Nieves:  Subject has met all inclusion criteria and no exclusion criteria have been met.   Subject is ready to fully enrolled in the [employer] 0616-015 study.    Ryley Gilbert RN

## 2024-08-13 ENCOUNTER — TRANSFERRED RECORDS (OUTPATIENT)
Dept: CARDIOLOGY | Facility: CLINIC | Age: 64
End: 2024-08-13
Payer: COMMERCIAL

## 2024-08-13 ENCOUNTER — TELEPHONE (OUTPATIENT)
Dept: FAMILY MEDICINE | Facility: CLINIC | Age: 64
End: 2024-08-13

## 2024-08-13 NOTE — LETTER
[facility]  [address] [address] [id number] [phone]       August 27, 2024    Dago Dangelo  [address]   [place] [address]    Dear Jono,    We care about your health and have reviewed your health plan and are making recommendations based on this review, to optimize your health.    You are in particular need of attention regarding:  -Coronary Artery and/or Vascular Disease    We are recommending that you:  -schedule a FOLLOWUP OFFICE APPOINTMENT with me.  I will recheck your: Lipids (fasting cholesterol - nothing to eat except water and/or meds for 8+ hours) test.    Also, if you are smoking still and would like some help, then please contact us or make an appointment to discuss your options ([email or web address] has very good free information.)                                                            In addition, here is a list of due or overdue Health Maintenance reminders.    Health Maintenance Due   Topic Date Due    Zoster (Shingles) Vaccine (1 of 2) Never done    LUNG CANCER SCREENING  Never done    RSV VACCINE (Pregnancy & 60+) (1 - 1-dose 60+ series) Never done    Talk to your care team about options to quit tobacco use.  09/07/2024    Yearly Preventive Visit  09/07/2024    Basic Metabolic Panel  09/07/2024    ANNUAL REVIEW OF HM ORDERS  09/07/2024       To address the above recommendations, we encourage you to contact us at [phone], via [employer] or by contacting Central Scheduling toll free at [phone] 24 hours a day. They will assist you with finding the most convenient time and location.    Thank you for trusting [facility] and we appreciate the opportunity to serve you.  We look forward to supporting your healthcare needs in the future.    Healthy Regards,    Your [facility] Team

## 2024-08-13 NOTE — PROGRESS NOTES
Research laboratory data from August 8 reviewed and results not clinically significant including mildly increased fasting triglycerides of 264, increased glucose of 132, mildly increased albumin at 5.5.  LF    Research laboratory data from August 6 reviewed and results not clinically significant, this includes mildly increased glucose of 108.  LF    July 29 research labs reviewed and results normal and thus not clinically significant.  LF

## 2024-08-13 NOTE — LETTER
[facility]  [address] [address] [phone]       November 25, 2024    Dago Dangelo  [address]   [place] [address]    Dear Jono,    We care about your health and have reviewed your health plan and are making recommendations based on this review, to optimize your health.    You are in particular need of attention regarding:  -High Blood Pressure    We are recommending that you:  -schedule a NURSE-ONLY BLOOD PRESSURE APPOINTMENT within the next 1-4 weeks.    Also, if you are smoking still and would like some help, then please contact us or make an appointment to discuss your options ([email or web address] has very good free information.)                                                            In addition, here is a list of due or overdue Health Maintenance reminders.    Health Maintenance Due   Topic Date Due    LUNG CANCER SCREENING  Never done    Talk to your care team about options to quit tobacco use.  09/07/2024    AORTIC ANEURYSM SCREENING (SYSTEM ASSIGNED)  Never done    Pneumococcal Vaccine (3 of 3 - PPSV23 or PCV20) 11/05/2024    Zoster (Shingles) Vaccine (2 of 2) 11/27/2024       To address the above recommendations, we encourage you to contact us at [phone], via [employer] or by contacting Central Scheduling toll free at [phone] 24 hours a day. They will assist you with finding the most convenient time and location.    Thank you for trusting [facility] and we appreciate the opportunity to serve you.  We look forward to supporting your healthcare needs in the future.    Healthy Regards,    Your [facility] Team

## 2024-08-13 NOTE — CONFIDENTIAL NOTE
Patient Quality Outreach    Patient is due for the following:   IVD  -  LDL (Fasting) and IVD Follow-up Visit    Next Steps:   Schedule a office visit for ivd    Type of outreach:    Sent [employer] message.      Questions for provider review:    None           Garrison Herron MA

## 2024-08-21 DIAGNOSIS — I10 BENIGN ESSENTIAL HYPERTENSION: ICD-10-CM

## 2024-08-22 DIAGNOSIS — R80.9 TYPE 2 DIABETES MELLITUS WITH MICROALBUMINURIA, WITHOUT LONG-TERM CURRENT USE OF INSULIN (H): ICD-10-CM

## 2024-08-22 DIAGNOSIS — E11.29 TYPE 2 DIABETES MELLITUS WITH MICROALBUMINURIA, WITHOUT LONG-TERM CURRENT USE OF INSULIN (H): ICD-10-CM

## 2024-08-22 DIAGNOSIS — I10 BENIGN ESSENTIAL HYPERTENSION: ICD-10-CM

## 2024-08-22 RX ORDER — CHLORTHALIDONE 25 MG/1
25 TABLET ORAL EVERY MORNING
Qty: 90 TABLET | Refills: 0 | Status: SHIPPED | OUTPATIENT
Start: 2024-08-22

## 2024-08-22 RX ORDER — CARVEDILOL 25 MG/1
25 TABLET ORAL 2 TIMES DAILY WITH MEALS
Qty: 180 TABLET | Refills: 0 | Status: SHIPPED | OUTPATIENT
Start: 2024-08-22

## 2024-08-27 NOTE — CONFIDENTIAL NOTE
Patient Quality Outreach    Patient is due for the following:   IVD  -  LDL (Fasting) and IVD Follow-up Visit    Next Steps:   Schedule a office visit for ivd    Type of outreach:    Sent letter.    Next Steps:  Reach out within 90 days via Letter.    Max number of attempts reached: No. Will try again in 90 days if patient still on fail list.    Questions for provider review:    None           Garrison Herron MA

## 2024-09-05 ENCOUNTER — OFFICE VISIT (OUTPATIENT)
Dept: CARDIOLOGY | Facility: CLINIC | Age: 64
End: 2024-09-05
Payer: COMMERCIAL

## 2024-09-05 VITALS
DIASTOLIC BLOOD PRESSURE: 64 MMHG | BODY MASS INDEX: 29.09 KG/M2 | WEIGHT: 181 LBS | SYSTOLIC BLOOD PRESSURE: 121 MMHG | HEART RATE: 76 BPM | OXYGEN SATURATION: 95 % | HEIGHT: 66 IN | RESPIRATION RATE: 18 BRPM

## 2024-09-05 VITALS
OXYGEN SATURATION: 96 % | DIASTOLIC BLOOD PRESSURE: 64 MMHG | SYSTOLIC BLOOD PRESSURE: 122 MMHG | HEART RATE: 73 BPM | BODY MASS INDEX: 29.09 KG/M2 | HEIGHT: 66 IN | WEIGHT: 181 LBS | RESPIRATION RATE: 18 BRPM

## 2024-09-05 DIAGNOSIS — E66.811 CLASS 1 OBESITY DUE TO EXCESS CALORIES WITHOUT SERIOUS COMORBIDITY WITH BODY MASS INDEX (BMI) OF 30.0 TO 30.9 IN ADULT: ICD-10-CM

## 2024-09-05 DIAGNOSIS — E11.29 TYPE 2 DIABETES MELLITUS WITH MICROALBUMINURIA, WITHOUT LONG-TERM CURRENT USE OF INSULIN (H): ICD-10-CM

## 2024-09-05 DIAGNOSIS — E78.5 HYPERLIPIDEMIA LDL GOAL <70: ICD-10-CM

## 2024-09-05 DIAGNOSIS — I10 BENIGN ESSENTIAL HYPERTENSION: ICD-10-CM

## 2024-09-05 DIAGNOSIS — E66.09 CLASS 1 OBESITY DUE TO EXCESS CALORIES WITHOUT SERIOUS COMORBIDITY WITH BODY MASS INDEX (BMI) OF 30.0 TO 30.9 IN ADULT: ICD-10-CM

## 2024-09-05 DIAGNOSIS — R80.9 TYPE 2 DIABETES MELLITUS WITH MICROALBUMINURIA, WITHOUT LONG-TERM CURRENT USE OF INSULIN (H): ICD-10-CM

## 2024-09-05 DIAGNOSIS — I25.83 CORONARY ARTERIOSCLEROSIS DUE TO LIPID RICH PLAQUE: Primary | ICD-10-CM

## 2024-09-05 DIAGNOSIS — Z00.6 EXAMINATION OF PARTICIPANT OR CONTROL IN CLINICAL RESEARCH: Primary | ICD-10-CM

## 2024-09-05 DIAGNOSIS — F17.200 TOBACCO USE DISORDER: ICD-10-CM

## 2024-09-05 PROCEDURE — G2211 COMPLEX E/M VISIT ADD ON: HCPCS | Performed by: INTERNAL MEDICINE

## 2024-09-05 PROCEDURE — 99207 PR NO CHARGE-RESEARCH SERVICE: CPT | Performed by: INTERNAL MEDICINE

## 2024-09-05 PROCEDURE — 99214 OFFICE O/P EST MOD 30 MIN: CPT | Performed by: INTERNAL MEDICINE

## 2024-09-05 PROCEDURE — 36415 COLL VENOUS BLD VENIPUNCTURE: CPT | Performed by: INTERNAL MEDICINE

## 2024-09-05 NOTE — PATIENT INSTRUCTIONS
Mr Dago Dangelo,  I enjoyed visiting with you again today.  I am glad to hear you are doing well.  Per our conversation recall do not look at blood  work  for lipids.  I will plan on seeing you for study.  Twan Nieves

## 2024-09-05 NOTE — PROGRESS NOTES
[facility]  Heart Care Clinic Follow-up Note    Assessment & Plan        (I25.10,  I25.83) Coronary arteriosclerosis due to lipid rich plaque  (primary encounter diagnosis)  Comment: Angiography secondary to positive enzymes December 2015 showed left main 35 to 40% stenosis, LAD with 30 to 40% stenosis and first diagonal less than 30% stenosis, circumflex dominant with distal diffuse 70 to 75% stenosis and a PDA 40 to 50% stenosis but the first OM with a 99% lesion which received a Promus 2.25 x 12 mm stent as well as a 2.5 x 16 mm distal circumflex stent.  The right coronary artery was nondominant with a 90% narrowing.  He is currently asymptomatic and work on prevention.     (I21.4) NSTEMI (non-ST elevated myocardial infarction) (H)  Comment: This was a posterolateral non-ST segment elevation MI due to OM disease.  Stable.     (I10) Benign essential hypertension  Comment: Would consider resistant controlled on Coreg, chlorthalidone, losartan.  Under good control.     (E78.5) Hyperlipidemia LDL goal <70  Comment: Under fair control, total cholesterol 203 with an , would like to see these less than 200 and 70 respectively.  He has been randomized into the coral reef study.     (F17.200) Tobacco use disorder  Comment: He knows he needs to discontinue.     (E11.29,  R80.9) Type 2 diabetes mellitus with microalbuminuria, without long-term current use of insulin (H)  Comment: So noted on Jardiance as well as metformin with hemoglobin A1c of 6.8.     (E66.09,  Z68.30) Class 1 obesity due to excess calories without serious comorbidity with body mass index (BMI) of 30.0 to 30.9 in adult  Comment: Needs to work on weight loss.    Plan  1.  Continue to work on weight loss.  2.  Continue to stop trying to smoke.  3.  Randomized into the coral reef study and follow-up with me per study.    The longitudinal plan of care for the diagnosis(es)/condition(s) as documented were addressed during this visit. Due to  "the added complexity in care, I will continue to support Jono in the subsequent management and with ongoing continuity of care.     Subjective  CC: 63-year-old white gentleman being seen as part of the [facility] study.  He feels well, still smoking.  No chest pains, palpitations, shortness of breath, PND, orthopnea, syncope, dizziness or peripheral edema.    Medications  Current Outpatient Medications   Medication Sig Dispense Refill    aspirin (ASPIRIN LOW DOSE) 81 MG EC tablet TAKE 1 TABLET BY MOUTH EVERY  tablet 3    buPROPion (ZYBAN) 150 MG 12 hr tablet Take 1 tablet (150 mg) by mouth 2 times daily 180 tablet 1    carvedilol (COREG) 25 MG tablet TAKE 1 TABLET BY MOUTH TWICE A DAY WITH MEALS 180 tablet 0    chlorthalidone (HYGROTON) 25 MG tablet TAKE 1 TABLET BY MOUTH EVERY MORNING 90 tablet 0    empagliflozin (JARDIANCE) 25 MG TABS tablet Take 1 tablet (25 mg) by mouth daily 90 tablet 1    gemfibrozil (LOPID) 600 MG tablet TAKE 1 TABLET BY MOUTH 2 TIMES DAILY 180 tablet 3    ibuprofen (ADVIL/MOTRIN) 600 MG tablet Take 1 tablet (600 mg) by mouth every 6 hours as needed for moderate pain 20 tablet 0    losartan (COZAAR) 100 MG tablet TAKE 1 TABLET BY MOUTH EVERY DAY 90 tablet 1    metFORMIN (GLUCOPHAGE) 500 MG tablet TAKE 2 TABLETS BY MOUTH 2 TIMES DAILY WITH MEALS 360 tablet 0    multivitamin, therapeutic with minerals (THERA-VIT-M) TABS Take 1 tablet by mouth every evening      rosuvastatin (CRESTOR) 40 MG tablet Take 1 tablet (40 mg) by mouth daily 90 tablet 3    nitroGLYcerin (NITROSTAT) 0.4 MG sublingual tablet Place 1 tablet (0.4 mg) under the tongue every 5 minutes as needed for chest pain if you are still having symptoms after 3 doses (15 minutes) call 911. 25 tablet 1       Objective  /64 (BP Location: Right arm, Patient Position: Sitting, Cuff Size: Adult Regular)   Pulse 76   Resp 18   Ht 1.67 m (5' 5.75\")   Wt 82.1 kg (181 lb)   SpO2 95%   BMI 29.44 kg/m      General Appearance:    " "Alert, cooperative, no distress, appears stated age   Head:    Normocephalic, without obvious abnormality, atraumatic   Throat:   Lips, mucosa, and tongue normal; teeth and gums normal   Neck:   Supple, symmetrical, trachea midline, no adenopathy;        thyroid:  No enlargement/tenderness/nodules; no carotid    bruit or JVD   Back:     Symmetric, no curvature, ROM normal, no CVA tenderness   Lungs:     Clear to auscultation bilaterally, respirations unlabored   Chest wall:    No tenderness or deformity   Heart:    Regular rate and rhythm, S1 and S2 normal, no murmur, rub   or gallop   Abdomen:     Soft, non-tender, bowel sounds active all four quadrants,     no masses, no organomegaly   Extremities:   Normal, atraumatic, no cyanosis or edema   Pulses:   2+ and symmetric all extremities   Skin:   Skin color, texture, turgor normal, no rashes or lesions     Results    Lab Results personally reviewed   Lab Results   Component Value Date    CHOL 203 (H) 06/06/2024    CHOL 174 12/01/2023     Lab Results   Component Value Date    HDL 42 06/06/2024    HDL 40 12/01/2023     No components found for: \"LDLCALC\"  Lab Results   Component Value Date    TRIG 234 (H) 06/06/2024    TRIG 302 (H) 12/01/2023     Lab Results   Component Value Date    WBC 11.9 (H) 08/25/2023    HGB 14.8 08/25/2023    HCT 43.5 08/25/2023     08/25/2023     Lab Results   Component Value Date    BUN 14.8 09/07/2023     09/07/2023    CO2 20 (L) 09/07/2023         "

## 2024-09-05 NOTE — PROGRESS NOTES
"  Study Name: A Phase 3 Randomized, Placebo-Controlled Clinical Study to Evaluate the Efficacy and Safety of MK-0616 in Reducing Major Adverse Cardiovascular Events in   Participants at High Cardiovascular Risk    : Twan Nieves MD  Protocol Version: 015-05, [id number]    [employer] MK-0616 Study Randomization Visit    Ongoing consent discussion  Reviewed study with him. he confirms he wishes to continue to participate in this trial    Confirmed participant still has participant ID card    Reviewed/confirmed Inclusion/Exclusion criteria   Continues to be eligible to participate    Current medications reviewed    [x] Yes  [] No   Updates noted, please see below for details   Current lipid lowering therapy (LLT) reviewed [x] Yes  [] No    Confirmed no changes since last visit    Adverse events solicited [x] Yes  [] No   If AE reported, see below for AE details    /64 (BP Location: Right arm, Patient Position: Sitting, Cuff Size: Adult Regular)   Pulse 73   Resp 18   Ht 1.67 m (5' 5.75\")   Wt 82.1 kg (181 lb)   SpO2 96%   BMI 29.44 kg/m      done after subject has been seated for more than 5 minutes    Study labs drawn at 10.34 am (blood for genetic analysis and plasma for biomarker research, if consented)  (please refer to lab requisition form for full details)        Physical exam done by Dr Nieves   Please refer to their note for full details    Study medication  IRT visit registration completed.  Confirmed last ate food at 18.00 on 04 Sep 2024   Needs to be fasting for at least 8 hours (small amounts of water allowed)  First dose of IP given at 10.39 am      Sent home with kit [id number]  Lot number: [id number]        Confirmed medical history current   [] Yes  [x] No   If no, medical history updated (see below for details)    Tote bag and Coffee mug given      Clinical Endpoint Review (Clinical Events Assessment)  All-cause mortality (including CV death and non-CV death) [] Yes  [x] " No  Potential arterial revascularization - coronary  [] Yes  [x] No  Potential arterial revascularization - cerebrovascular [] Yes  [x] No  Potential arterial revascularization - peripheral   [] Yes  [x] No  Potential Cardiac ischemic events   (MI, hospitalization for unstable angina)  [] Yes  [x] No  Potential Cerebrovascular events (Stroke, TIA)   [] Yes  [x] No  Potential peripheral ischemic events   (acute limb ischemia, chronic limb ischemia, other  limb events including amputation)   [] Yes  [x] No    Events of Clinical Interest  Potential DILI [] Yes  [x] No     New-onset diabetes mellitus [] Yes  [x] No  Worsening diabetes mellitus [] Yes [x] No    Vital Status: Subject remains alive and well.    Plan: Next appointment due in ~4 weeks   Scheduled with participant for 30 Sep 2024      Current Outpatient Medications:     aspirin (ASPIRIN LOW DOSE) 81 MG EC tablet, TAKE 1 TABLET BY MOUTH EVERY DAY, Disp: 100 tablet, Rfl: 3    buPROPion (ZYBAN) 150 MG 12 hr tablet, Take 1 tablet (150 mg) by mouth 2 times daily, Disp: 180 tablet, Rfl: 1    carvedilol (COREG) 25 MG tablet, TAKE 1 TABLET BY MOUTH TWICE A DAY WITH MEALS, Disp: 180 tablet, Rfl: 0    chlorthalidone (HYGROTON) 25 MG tablet, TAKE 1 TABLET BY MOUTH EVERY MORNING, Disp: 90 tablet, Rfl: 0    empagliflozin (JARDIANCE) 25 MG TABS tablet, Take 1 tablet (25 mg) by mouth daily, Disp: 90 tablet, Rfl: 1    gemfibrozil (LOPID) 600 MG tablet, TAKE 1 TABLET BY MOUTH 2 TIMES DAILY, Disp: 180 tablet, Rfl: 3    ibuprofen (ADVIL/MOTRIN) 600 MG tablet, Take 1 tablet (600 mg) by mouth every 6 hours as needed for moderate pain, Disp: 20 tablet, Rfl: 0    losartan (COZAAR) 100 MG tablet, TAKE 1 TABLET BY MOUTH EVERY DAY, Disp: 90 tablet, Rfl: 1    metFORMIN (GLUCOPHAGE) 500 MG tablet, TAKE 2 TABLETS BY MOUTH 2 TIMES DAILY WITH MEALS, Disp: 360 tablet, Rfl: 0    multivitamin, therapeutic with minerals (THERA-VIT-M) TABS, Take 1 tablet by mouth every evening, Disp: , Rfl:      nitroGLYcerin (NITROSTAT) 0.4 MG sublingual tablet, Place 1 tablet (0.4 mg) under the tongue every 5 minutes as needed for chest pain if you are still having symptoms after 3 doses (15 minutes) call 911., Disp: 25 tablet, Rfl: 1    rosuvastatin (CRESTOR) 40 MG tablet, Take 1 tablet (40 mg) by mouth daily, Disp: 90 tablet, Rfl: 3    Ryley Gilbert RN  Clinical Trials Nurse

## 2024-09-05 NOTE — LETTER
9/5/2024    Tl Ortiz MD  [address] [address] [facility] [facility] [address]    RE: Dago FENG Antolin       Dear Colleague,     I had the pleasure of seeing Dago FENG Dangelo in the [facility].      [facility]  Heart Care Clinic Follow-up Note    Assessment & Plan        (I25.10,  I25.83) Coronary arteriosclerosis due to lipid rich plaque  (primary encounter diagnosis)  Comment: Angiography secondary to positive enzymes December 2015 showed left main 35 to 40% stenosis, LAD with 30 to 40% stenosis and first diagonal less than 30% stenosis, circumflex dominant with distal diffuse 70 to 75% stenosis and a PDA 40 to 50% stenosis but the first OM with a 99% lesion which received a Promus 2.25 x 12 mm stent as well as a 2.5 x 16 mm distal circumflex stent.  The right coronary artery was nondominant with a 90% narrowing.  He is currently asymptomatic and work on prevention.     (I21.4) NSTEMI (non-ST elevated myocardial infarction) (H)  Comment: This was a posterolateral non-ST segment elevation MI due to OM disease.  Stable.     (I10) Benign essential hypertension  Comment: Would consider resistant controlled on Coreg, chlorthalidone, losartan.  Under good control.     (E78.5) Hyperlipidemia LDL goal <70  Comment: Under fair control, total cholesterol 203 with an , would like to see these less than 200 and 70 respectively.  He has been randomized into the coral reef study.     (F17.200) Tobacco use disorder  Comment: He knows he needs to discontinue.     (E11.29,  R80.9) Type 2 diabetes mellitus with microalbuminuria, without long-term current use of insulin (H)  Comment: So noted on Jardiance as well as metformin with hemoglobin A1c of 6.8.     (E66.09,  Z68.30) Class 1 obesity due to excess calories without serious comorbidity with body mass index (BMI) of 30.0 to 30.9 in adult  Comment: Needs to work on weight loss.    Plan  1.  Continue to work on weight loss.  2.  Continue to stop  trying to smoke.  3.  Randomized into the coral reef study and follow-up with me per study.    The longitudinal plan of care for the diagnosis(es)/condition(s) as documented were addressed during this visit. Due to the added complexity in care, I will continue to support Jono in the subsequent management and with ongoing continuity of care.     Subjective  CC: 63-year-old white gentleman being seen as part of the coral reef study.  He feels well, still smoking.  No chest pains, palpitations, shortness of breath, PND, orthopnea, syncope, dizziness or peripheral edema.    Medications  Current Outpatient Medications   Medication Sig Dispense Refill     aspirin (ASPIRIN LOW DOSE) 81 MG EC tablet TAKE 1 TABLET BY MOUTH EVERY  tablet 3     buPROPion (ZYBAN) 150 MG 12 hr tablet Take 1 tablet (150 mg) by mouth 2 times daily 180 tablet 1     carvedilol (COREG) 25 MG tablet TAKE 1 TABLET BY MOUTH TWICE A DAY WITH MEALS 180 tablet 0     chlorthalidone (HYGROTON) 25 MG tablet TAKE 1 TABLET BY MOUTH EVERY MORNING 90 tablet 0     empagliflozin (JARDIANCE) 25 MG TABS tablet Take 1 tablet (25 mg) by mouth daily 90 tablet 1     gemfibrozil (LOPID) 600 MG tablet TAKE 1 TABLET BY MOUTH 2 TIMES DAILY 180 tablet 3     ibuprofen (ADVIL/MOTRIN) 600 MG tablet Take 1 tablet (600 mg) by mouth every 6 hours as needed for moderate pain 20 tablet 0     losartan (COZAAR) 100 MG tablet TAKE 1 TABLET BY MOUTH EVERY DAY 90 tablet 1     metFORMIN (GLUCOPHAGE) 500 MG tablet TAKE 2 TABLETS BY MOUTH 2 TIMES DAILY WITH MEALS 360 tablet 0     multivitamin, therapeutic with minerals (THERA-VIT-M) TABS Take 1 tablet by mouth every evening       rosuvastatin (CRESTOR) 40 MG tablet Take 1 tablet (40 mg) by mouth daily 90 tablet 3     nitroGLYcerin (NITROSTAT) 0.4 MG sublingual tablet Place 1 tablet (0.4 mg) under the tongue every 5 minutes as needed for chest pain if you are still having symptoms after 3 doses (15 minutes) call 911. 25 tablet 1  "      Objective  /64 (BP Location: Right arm, Patient Position: Sitting, Cuff Size: Adult Regular)   Pulse 76   Resp 18   Ht 1.67 m (5' 5.75\")   Wt 82.1 kg (181 lb)   SpO2 95%   BMI 29.44 kg/m      General Appearance:    Alert, cooperative, no distress, appears stated age   Head:    Normocephalic, without obvious abnormality, atraumatic   Throat:   Lips, mucosa, and tongue normal; teeth and gums normal   Neck:   Supple, symmetrical, trachea midline, no adenopathy;        thyroid:  No enlargement/tenderness/nodules; no carotid    bruit or JVD   Back:     Symmetric, no curvature, ROM normal, no CVA tenderness   Lungs:     Clear to auscultation bilaterally, respirations unlabored   Chest wall:    No tenderness or deformity   Heart:    Regular rate and rhythm, S1 and S2 normal, no murmur, rub   or gallop   Abdomen:     Soft, non-tender, bowel sounds active all four quadrants,     no masses, no organomegaly   Extremities:   Normal, atraumatic, no cyanosis or edema   Pulses:   2+ and symmetric all extremities   Skin:   Skin color, texture, turgor normal, no rashes or lesions     Results    Lab Results personally reviewed   Lab Results   Component Value Date    CHOL 203 (H) 06/06/2024    CHOL 174 12/01/2023     Lab Results   Component Value Date    HDL 42 06/06/2024    HDL 40 12/01/2023     No components found for: \"LDLCALC\"  Lab Results   Component Value Date    TRIG 234 (H) 06/06/2024    TRIG 302 (H) 12/01/2023     Lab Results   Component Value Date    WBC 11.9 (H) 08/25/2023    HGB 14.8 08/25/2023    HCT 43.5 08/25/2023     08/25/2023     Lab Results   Component Value Date    BUN 14.8 09/07/2023     09/07/2023    CO2 20 (L) 09/07/2023             Thank you for allowing me to participate in the care of your patient.      Sincerely,     THO NIEVES MD     [facility]  cc:   Jessi Nieves MD  [address], [address]  [place], [place] " [address]

## 2024-09-06 PROCEDURE — 99207 PR NO CHARGE-RESEARCH SERVICE: CPT | Performed by: INTERNAL MEDICINE

## 2024-09-12 ENCOUNTER — TRANSFERRED RECORDS (OUTPATIENT)
Dept: CARDIOLOGY | Facility: CLINIC | Age: 64
End: 2024-09-12
Payer: COMMERCIAL

## 2024-09-12 NOTE — PROGRESS NOTES
Research laboratory data from September 5 reviewed and results not clinically significant including mildly high hemoglobin A1c of 6.8.  LF

## 2024-09-18 DIAGNOSIS — I10 BENIGN ESSENTIAL HYPERTENSION: ICD-10-CM

## 2024-09-18 DIAGNOSIS — I21.4 NON-STEMI (NON-ST ELEVATED MYOCARDIAL INFARCTION) (H): ICD-10-CM

## 2024-09-18 RX ORDER — ASPIRIN 81 MG/1
TABLET, COATED ORAL
Qty: 30 TABLET | Refills: 5 | Status: SHIPPED | OUTPATIENT
Start: 2024-09-18

## 2024-09-19 RX ORDER — LOSARTAN POTASSIUM 100 MG/1
100 TABLET ORAL DAILY
Qty: 90 TABLET | Refills: 1 | Status: SHIPPED | OUTPATIENT
Start: 2024-09-19

## 2024-09-30 ENCOUNTER — OFFICE VISIT (OUTPATIENT)
Dept: CARDIOLOGY | Facility: CLINIC | Age: 64
End: 2024-09-30
Payer: COMMERCIAL

## 2024-09-30 VITALS
DIASTOLIC BLOOD PRESSURE: 57 MMHG | BODY MASS INDEX: 29.18 KG/M2 | WEIGHT: 179.4 LBS | SYSTOLIC BLOOD PRESSURE: 108 MMHG | HEART RATE: 72 BPM

## 2024-09-30 DIAGNOSIS — F17.200 TOBACCO USE DISORDER: ICD-10-CM

## 2024-09-30 DIAGNOSIS — Z00.6 EXAMINATION OF PARTICIPANT OR CONTROL IN CLINICAL RESEARCH: Primary | ICD-10-CM

## 2024-09-30 RX ORDER — BUPROPION HYDROCHLORIDE 150 MG/1
150 TABLET, FILM COATED, EXTENDED RELEASE ORAL 2 TIMES DAILY
Qty: 180 TABLET | Refills: 0 | Status: SHIPPED | OUTPATIENT
Start: 2024-09-30

## 2024-09-30 NOTE — PROGRESS NOTES
Study Name: A Phase 3 Randomized, Placebo-Controlled Clinical Study to Evaluate the Efficacy and Safety of MK-0616 in Reducing Major Adverse Cardiovascular Events in   Participants at High Cardiovascular Risk    : Twan Nieves MD  Protocol Version: 015-05, [id number]    Diagnosis/event description (diagnosis preferred):  Diarrhea  Start date: 05 Sep 2024  Date resolved: 09 sep 2024   Date study team aware of event:  30 Sep 2024  Intensity: ([x] mild /[]  moderate / [] severe)  Study Medication adjustment:  [] Yes   [x] No  Outcome: ([] resolved [with or without sequelae] / [] recovering /[x] not recovered /[] death /[] unknown)      Was treatment given for this event:  [x]  No   If yes, provide details  Serious adverse event?   [] Yes   [x] No  Special interest event?  [] Yes   [x] No  Endpoint?  [] Yes   [x] No   Fatal event?  [] Yes   [x] No      FLORES Young Dr.: Reasonable possibility that AE is related to study treatment  [] Yes   [x] No

## 2024-09-30 NOTE — PROGRESS NOTES
Study Name: A Phase 3 Randomized, Placebo-Controlled Clinical Study to Evaluate the Efficacy and Safety of MK-0616 in Reducing Major Adverse Cardiovascular Events in   Participants at High Cardiovascular Risk    : Twan Nieves MD      [facility] MK-0616 Study Visit [x] 3/Week 4 [] 4/Week 13 [] 5/Week 26  [] 7/Week 52    Ongoing consent discussion  Reviewed study with him. he confirms he wishes to continue to participate in this trial    Confirmed participant still has participant ID card    Current medications reviewed    [x] Yes  [] No   Updates noted, please see below for details   Current lipid lowering therapy (LLT) reviewed [x] Yes  [] No    Confirmed no changes since last visit    Adverse events solicited [x] Yes  [] No   If AE reported, see below for AE details  One AE of Diarrhea reported, AE form completed      /57 (BP Location: Right arm, Patient Position: Sitting, Cuff Size: Adult Regular)   Pulse 72   Wt 81.4 kg (179 lb 6.4 oz)   BMI 29.18 kg/m    done after subject has been seated for more than 5 minutes    Study labs drawn at 10.49am (please refer to lab requisition form for full details)     Study medication  IRT visit registration completed.  Confirmed last ate food at 19.00 on 29 Sep 2024   Needs to be fasting for at least 8 hours (small amounts of water allowed)    IRT completed at this visit in error, a rollback visit has been completed in [employer].    Sent home with kit No  [x] N/A for visit 3    Study drug accountability done   100 % compliant  Last IP dose 30 Sep 2024 at 06.00 am    Subject confirmed they are compliant with fasting instructions.  ( IP taken after fasting for at least 8 hrs and only eating and drinking at least 30 minutes post IP administration)    [x] Yes  [] No    Clinical Endpoint Review (Clinical Events Assessment)  All-cause mortality (including CV death and non-CV death) [] Yes  [x] No  Potential arterial revascularization - coronary  [] Yes  [x]  No  Potential arterial revascularization - cerebrovascular [] Yes  [x] No  Potential arterial revascularization - peripheral   [] Yes  [x] No  Potential Cardiac ischemic events   (MI, hospitalization for unstable angina)  [] Yes  [x] No  Potential Cerebrovascular events (Stroke, TIA)   [] Yes  [x] No  Potential peripheral ischemic events   (acute limb ischemia, chronic limb ischemia, other  limb events including amputation)   [] Yes  [x] No    Events of Clinical Interest  Potential DILI [] Yes  [x] No     New-onset diabetes mellitus [] Yes  [x] No  Worsening diabetes mellitus [] Yes [x] No    Vital Status: Subject remains alive and well.    Plan:    Next visit scheduled with participant for Dec 2024      Current Outpatient Medications:     aspirin (ASPIRIN LOW DOSE) 81 MG EC tablet, TAKE 1 TABLET BY MOUTH EVERY DAY, Disp: 30 tablet, Rfl: 5    buPROPion (ZYBAN) 150 MG 12 hr tablet, Take 1 tablet (150 mg) by mouth 2 times daily, Disp: 180 tablet, Rfl: 1    carvedilol (COREG) 25 MG tablet, TAKE 1 TABLET BY MOUTH TWICE A DAY WITH MEALS, Disp: 180 tablet, Rfl: 0    chlorthalidone (HYGROTON) 25 MG tablet, TAKE 1 TABLET BY MOUTH EVERY MORNING, Disp: 90 tablet, Rfl: 0    empagliflozin (JARDIANCE) 25 MG TABS tablet, Take 1 tablet (25 mg) by mouth daily, Disp: 90 tablet, Rfl: 1    gemfibrozil (LOPID) 600 MG tablet, TAKE 1 TABLET BY MOUTH 2 TIMES DAILY, Disp: 180 tablet, Rfl: 3    ibuprofen (ADVIL/MOTRIN) 600 MG tablet, Take 1 tablet (600 mg) by mouth every 6 hours as needed for moderate pain, Disp: 20 tablet, Rfl: 0    losartan (COZAAR) 100 MG tablet, TAKE 1 TABLET BY MOUTH EVERY DAY, Disp: 90 tablet, Rfl: 1    metFORMIN (GLUCOPHAGE) 500 MG tablet, TAKE 2 TABLETS BY MOUTH 2 TIMES DAILY WITH MEALS, Disp: 360 tablet, Rfl: 0    multivitamin, therapeutic with minerals (THERA-VIT-M) TABS, Take 1 tablet by mouth every evening, Disp: , Rfl:     nitroGLYcerin (NITROSTAT) 0.4 MG sublingual tablet, Place 1 tablet (0.4 mg) under the  tongue every 5 minutes as needed for chest pain if you are still having symptoms after 3 doses (15 minutes) call 911., Disp: 25 tablet, Rfl: 1    rosuvastatin (CRESTOR) 40 MG tablet, Take 1 tablet (40 mg) by mouth daily, Disp: 90 tablet, Rfl: 3    study drug MK-0616 20 mg or placebo tablet, Take 1 tablet by mouth daily., Disp: , Rfl:     Ryley Gilbert RN  Clinical Trials Nurse

## 2024-10-02 ENCOUNTER — MYC REFILL (OUTPATIENT)
Dept: CARDIOLOGY | Facility: CLINIC | Age: 64
End: 2024-10-02
Payer: COMMERCIAL

## 2024-10-02 DIAGNOSIS — E78.5 DYSLIPIDEMIA: ICD-10-CM

## 2024-10-03 RX ORDER — ROSUVASTATIN CALCIUM 40 MG/1
40 TABLET, COATED ORAL DAILY
Qty: 90 TABLET | Refills: 3 | Status: SHIPPED | OUTPATIENT
Start: 2024-10-03

## 2024-10-13 ENCOUNTER — HEALTH MAINTENANCE LETTER (OUTPATIENT)
Age: 64
End: 2024-10-13

## 2024-10-18 SDOH — HEALTH STABILITY: PHYSICAL HEALTH: ON AVERAGE, HOW MANY DAYS PER WEEK DO YOU ENGAGE IN MODERATE TO STRENUOUS EXERCISE (LIKE A BRISK WALK)?: 1 DAY

## 2024-10-18 SDOH — HEALTH STABILITY: PHYSICAL HEALTH: ON AVERAGE, HOW MANY MINUTES DO YOU ENGAGE IN EXERCISE AT THIS LEVEL?: 10 MIN

## 2024-10-18 ASSESSMENT — SOCIAL DETERMINANTS OF HEALTH (SDOH): HOW OFTEN DO YOU GET TOGETHER WITH FRIENDS OR RELATIVES?: TWICE A WEEK

## 2024-10-21 ENCOUNTER — OFFICE VISIT (OUTPATIENT)
Dept: FAMILY MEDICINE | Facility: CLINIC | Age: 64
End: 2024-10-21
Attending: FAMILY MEDICINE
Payer: COMMERCIAL

## 2024-10-21 VITALS
HEART RATE: 65 BPM | WEIGHT: 181.7 LBS | SYSTOLIC BLOOD PRESSURE: 146 MMHG | BODY MASS INDEX: 29.2 KG/M2 | HEIGHT: 66 IN | DIASTOLIC BLOOD PRESSURE: 73 MMHG | TEMPERATURE: 98.2 F | OXYGEN SATURATION: 96 % | RESPIRATION RATE: 17 BRPM

## 2024-10-21 DIAGNOSIS — E11.29 TYPE 2 DIABETES MELLITUS WITH MICROALBUMINURIA, WITHOUT LONG-TERM CURRENT USE OF INSULIN (H): ICD-10-CM

## 2024-10-21 DIAGNOSIS — R80.9 TYPE 2 DIABETES MELLITUS WITH MICROALBUMINURIA, WITHOUT LONG-TERM CURRENT USE OF INSULIN (H): ICD-10-CM

## 2024-10-21 DIAGNOSIS — I73.9 CLAUDICATION OF CALF MUSCLES (H): ICD-10-CM

## 2024-10-21 DIAGNOSIS — I10 BENIGN ESSENTIAL HYPERTENSION: ICD-10-CM

## 2024-10-21 DIAGNOSIS — F17.200 TOBACCO USE DISORDER: ICD-10-CM

## 2024-10-21 DIAGNOSIS — Z00.00 PREVENTATIVE HEALTH CARE: Primary | ICD-10-CM

## 2024-10-21 LAB
ANION GAP SERPL CALCULATED.3IONS-SCNC: 17 MMOL/L (ref 7–15)
BUN SERPL-MCNC: 16.9 MG/DL (ref 8–23)
CALCIUM SERPL-MCNC: 9.8 MG/DL (ref 8.8–10.4)
CHLORIDE SERPL-SCNC: 101 MMOL/L (ref 98–107)
CREAT SERPL-MCNC: 0.81 MG/DL (ref 0.67–1.17)
EGFRCR SERPLBLD CKD-EPI 2021: >90 ML/MIN/1.73M2
EST. AVERAGE GLUCOSE BLD GHB EST-MCNC: 140 MG/DL
GLUCOSE SERPL-MCNC: 173 MG/DL (ref 70–99)
HBA1C MFR BLD: 6.5 % (ref 0–5.6)
HCO3 SERPL-SCNC: 21 MMOL/L (ref 22–29)
HOLD SPECIMEN: NORMAL
POTASSIUM SERPL-SCNC: 4.3 MMOL/L (ref 3.4–5.3)
PSA SERPL DL<=0.01 NG/ML-MCNC: 0.33 NG/ML (ref 0–4.5)
SODIUM SERPL-SCNC: 139 MMOL/L (ref 135–145)

## 2024-10-21 PROCEDURE — G0103 PSA SCREENING: HCPCS | Performed by: FAMILY MEDICINE

## 2024-10-21 PROCEDURE — 99214 OFFICE O/P EST MOD 30 MIN: CPT | Mod: 25 | Performed by: FAMILY MEDICINE

## 2024-10-21 PROCEDURE — 36415 COLL VENOUS BLD VENIPUNCTURE: CPT | Performed by: FAMILY MEDICINE

## 2024-10-21 PROCEDURE — 99396 PREV VISIT EST AGE 40-64: CPT | Performed by: FAMILY MEDICINE

## 2024-10-21 PROCEDURE — 83036 HEMOGLOBIN GLYCOSYLATED A1C: CPT | Performed by: FAMILY MEDICINE

## 2024-10-21 PROCEDURE — 80048 BASIC METABOLIC PNL TOTAL CA: CPT | Performed by: FAMILY MEDICINE

## 2024-10-21 RX ORDER — CARVEDILOL 25 MG/1
25 TABLET ORAL 2 TIMES DAILY WITH MEALS
Qty: 180 TABLET | Refills: 1 | Status: SHIPPED | OUTPATIENT
Start: 2024-10-21

## 2024-10-21 RX ORDER — BUPROPION HYDROCHLORIDE 150 MG/1
150 TABLET, FILM COATED, EXTENDED RELEASE ORAL 2 TIMES DAILY
Qty: 180 TABLET | Refills: 1 | Status: CANCELLED | OUTPATIENT
Start: 2024-10-21

## 2024-10-21 RX ORDER — CHLORTHALIDONE 25 MG/1
25 TABLET ORAL EVERY MORNING
Qty: 90 TABLET | Refills: 1 | Status: SHIPPED | OUTPATIENT
Start: 2024-10-21

## 2024-10-21 NOTE — PROGRESS NOTES
Preventive Care Visit  [facility]  Tl Ortiz MD, Family Medicine  Oct 21, 2024      Assessment and Plan    (Z00.00) Preventative health care  (primary encounter diagnosis)  Comment:   Plan: PSA, screen            (I10) Benign essential hypertension  Comment: BP OK, generally at goal, refilling medication  Plan: BASIC METABOLIC PANEL, chlorthalidone         (HYGROTON) 25 MG tablet, carvedilol (COREG) 25         MG tablet, OFFICE/OUTPT VISIT,EST,LEVL IV            (E11.29,  R80.9) Type 2 diabetes mellitus with microalbuminuria, without long-term current use of insulin (H)  Comment: A1c looks good, cont current plan  Plan: Hemoglobin A1c, metFORMIN (GLUCOPHAGE) 500 MG         tablet, OFFICE/OUTPT VISIT,EST,LEVL IV            (F17.200) Tobacco use disorder  Comment: has kind of stepped back from trying to quit, but does plan on trying again soon  Plan: OFFICE/OUTPT VISIT,EST,LEVL IV            (I73.9) Claudication of calf muscles (H)  Comment: many risk factors for PVD, may refer to vascular depending on results  Plan: US LESLI Doppler No Exercise              RTC in 6m    Tl Ortiz MD     Kelley De La Cruz is a 63 year old, presenting for the following:  Physical, Diabetes, and Hypertension        10/21/2024    10:03 AM   Additional Questions   Roomed by Ce REYES   Accompanied by self         10/21/2024    10:03 AM   Patient Reported Additional Medications   Patient reports taking the following new medications n/a        Health Care Directive  Patient does not have a Health Care Directive or Living Will: Discussed advance care planning with patient; however, patient declined at this time.    [facility]  Checks BP at home and is in a study for HTN and those readings are typically good.  Feeling well, no acute concerns.    Smoking - is back smoking, doesn't feel ready to quit, hasn't been real serious about it yet.    Noting JOSIE calf pain, janeth when walking uphill or on uneven surfaces.         10/18/2024   General Health   How would you rate your overall physical health? (!) FAIR   Feel stress (tense, anxious, or unable to sleep) Not at all            10/18/2024   Nutrition   Three or more servings of calcium each day? (!) NO   Diet: Low salt    Breakfast skipped   How many servings of fruit and vegetables per day? (!) 0-1   How many sweetened beverages each day? 0-1       Multiple values from one day are sorted in reverse-chronological order         10/18/2024   Exercise   Days per week of moderate/strenous exercise 1 day   Average minutes spent exercising at this level 10 min      (!) EXERCISE CONCERN      10/18/2024   Social Factors   Frequency of gathering with friends or relatives Twice a week   Worry food won't last until get money to buy more No   Food not last or not have enough money for food? Yes   Do you have housing? (Housing is defined as stable permanent housing and does not include staying ouside in a car, in a tent, in an abandoned building, in an overnight shelter, or couch-surfing.) Yes   Are you worried about losing your housing? No   Lack of transportation? No   Unable to get utilities (heat,electricity)? No      (!) FOOD SECURITY CONCERN PRESENT      10/18/2024   Fall Risk   Fallen 2 or more times in the past year? No   Trouble with walking or balance? No             10/18/2024   Dental   Dentist two times every year? (!) NO            10/18/2024   TB Screening   Were you born outside of the [place]? No                  10/18/2024   Substance Use   Alcohol more than 3/day or more than 7/wk No   Do you use any other substances recreationally? No        Social History     Tobacco Use    Smoking status: Every Day     Current packs/day: 1.00     Average packs/day: 1 pack/day for 30.0 years (30.0 ttl pk-yrs)     Types: Cigarettes    Smokeless tobacco: Never    Tobacco comments:     may have one once weekly   Vaping Use    Vaping status: Never Used   Substance Use Topics    Alcohol use: Yes      "Alcohol/week: 6.0 standard drinks of alcohol     Types: 6 Cans of beer per week    Drug use: No           10/18/2024   STI Screening   New sexual partner(s) since last STI/HIV test? No      Last PSA: No results found for: \"PSA\"  ASCVD Risk   The ASCVD Risk score (Nuria ALEMAN, et al., 2019) failed to calculate for the following reasons:    The patient has a prior MI or stroke diagnosis           Reviewed and updated as needed this visit by Provider                     Objective    Exam  BP (!) 149/85 (BP Location: Left arm, Patient Position: Sitting, Cuff Size: Adult Large)   Pulse 65   Temp 98.2  F (36.8  C) (Oral)   Resp 17   Ht 1.67 m (5' 5.75\")   Wt 82.4 kg (181 lb 11.2 oz)   SpO2 96%   BMI 29.55 kg/m     Estimated body mass index is 29.55 kg/m  as calculated from the following:    Height as of this encounter: 1.67 m (5' 5.75\").    Weight as of this encounter: 82.4 kg (181 lb 11.2 oz).    Physical Exam  Vitals and nursing note reviewed.   Constitutional:       Appearance: He is well-developed.   HENT:      Head: Normocephalic and atraumatic.      Right Ear: Tympanic membrane, ear canal and external ear normal.      Left Ear: Tympanic membrane, ear canal and external ear normal.   Eyes:      Pupils: Pupils are equal, round, and reactive to light.   Neck:      Thyroid: No thyromegaly.   Cardiovascular:      Rate and Rhythm: Normal rate and regular rhythm.      Heart sounds: Normal heart sounds.   Pulmonary:      Effort: Pulmonary effort is normal.      Breath sounds: Normal breath sounds.   Abdominal:      General: Bowel sounds are normal.      Palpations: Abdomen is soft. There is no mass.   Musculoskeletal:         General: Normal range of motion.   Lymphadenopathy:      Cervical: No cervical adenopathy.   Skin:     General: Skin is warm and dry.      Findings: No rash.   Neurological:      Mental Status: He is alert and oriented to person, place, and time.   Psychiatric:         Judgment: Judgment " normal.         Signed Electronically by: Tl Ortiz MD

## 2024-10-25 DIAGNOSIS — E11.29 TYPE 2 DIABETES MELLITUS WITH MICROALBUMINURIA, WITHOUT LONG-TERM CURRENT USE OF INSULIN (H): ICD-10-CM

## 2024-10-25 DIAGNOSIS — R80.9 TYPE 2 DIABETES MELLITUS WITH MICROALBUMINURIA, WITHOUT LONG-TERM CURRENT USE OF INSULIN (H): ICD-10-CM

## 2024-10-28 RX ORDER — EMPAGLIFLOZIN 25 MG/1
25 TABLET, FILM COATED ORAL DAILY
Qty: 30 TABLET | Refills: 2 | Status: SHIPPED | OUTPATIENT
Start: 2024-10-28

## 2024-10-30 ENCOUNTER — HOSPITAL ENCOUNTER (OUTPATIENT)
Dept: ULTRASOUND IMAGING | Facility: CLINIC | Age: 64
Discharge: HOME | End: 2024-10-30
Attending: FAMILY MEDICINE | Admitting: FAMILY MEDICINE
Payer: COMMERCIAL

## 2024-10-30 DIAGNOSIS — I73.9 CLAUDICATION OF CALF MUSCLES (H): ICD-10-CM

## 2024-10-30 PROCEDURE — 93924 LWR XTR VASC STDY BILAT: CPT

## 2024-11-01 ENCOUNTER — TELEPHONE (OUTPATIENT)
Dept: OTHER | Facility: CLINIC | Age: 64
End: 2024-11-01
Payer: COMMERCIAL

## 2024-11-01 DIAGNOSIS — I73.9 CLAUDICATION OF BOTH LOWER EXTREMITIES (H): Primary | ICD-10-CM

## 2024-11-01 NOTE — TELEPHONE ENCOUNTER
Referral received via [employer] on 11/1/24.    Referred by Tl Ortiz MD  for claudication     Previous imaging completed (pertinent to referral):  LESLI US exercise    Routing to scheduling to coordinate the following:    NEW VASCULAR PATIENT consult with Vascular Surgery  Please schedule this within 1-2 weeks      Appt note:  Referred by Tl Ortiz MD  for claudication     Anum ALAS, RN    [facility]  Office: [phone]  Fax: [phone]

## 2024-11-25 ENCOUNTER — OFFICE VISIT (OUTPATIENT)
Dept: OTHER | Facility: CLINIC | Age: 64
End: 2024-11-25
Attending: SURGERY
Payer: COMMERCIAL

## 2024-11-25 VITALS — HEART RATE: 65 BPM | DIASTOLIC BLOOD PRESSURE: 81 MMHG | SYSTOLIC BLOOD PRESSURE: 158 MMHG

## 2024-11-25 DIAGNOSIS — I73.9 CLAUDICATION OF BOTH LOWER EXTREMITIES (H): Primary | ICD-10-CM

## 2024-11-25 DIAGNOSIS — I70.213 INTERMITTENT CLAUDICATION OF BOTH LOWER EXTREMITIES DUE TO ATHEROSCLEROSIS (H): Primary | ICD-10-CM

## 2024-11-25 DIAGNOSIS — I21.4 NON-STEMI (NON-ST ELEVATED MYOCARDIAL INFARCTION) (H): ICD-10-CM

## 2024-11-25 PROCEDURE — G0463 HOSPITAL OUTPT CLINIC VISIT: HCPCS | Performed by: SURGERY

## 2024-11-25 PROCEDURE — 99204 OFFICE O/P NEW MOD 45 MIN: CPT | Performed by: SURGERY

## 2024-11-25 RX ORDER — NITROGLYCERIN 0.4 MG/1
0.4 TABLET SUBLINGUAL EVERY 5 MIN PRN
Qty: 25 TABLET | Refills: 1 | Status: CANCELLED | OUTPATIENT
Start: 2024-11-25

## 2024-11-25 RX ORDER — NITROGLYCERIN 0.4 MG/1
TABLET SUBLINGUAL
Qty: 10 TABLET | Refills: 0 | Status: SHIPPED | OUTPATIENT
Start: 2024-11-25

## 2024-11-25 NOTE — PROGRESS NOTES
It was a pleasure to see Mr. Dago Dangelo in the vascular surgery clinic today.  He is a 64-year-old gentleman who has been referred to us by Dr. Ortiz for evaluation and treatment of bilateral lower extremity intermittent arterial claudication.    Patient tells me that he has felt pain in his calves for about a year.  Walking on flat ground does not seem to be troublesome but walking on uneven ground or if he has to mow his lawn proved challenging where he developed cramping in his calves.    Denies open wounds or rest pain.    Medical history is notable for hyperlipidemia, hypertension, coronary artery disease and tobacco abuse.  He he has had previous percutaneous coronary intervention.  That was in 2015.  In December 2023 he underwent exercise tress echocardiography.  This showed stress-induced wall motion abnormalities consistent with ischemia in the inferolateral and lateral walls.    Noninvasive arterial studies in the form of an ankle-brachial index were done on 30th October 2, 2024.  Resting right ankle-brachial index is 0.73 and left is 0.74.  Patient was able to walk on the treadmill for 3 minutes at a 10 degree incline and at a speed of 1.5 miles an hour.  Exercise was terminated due to calf tightness bilaterally.  Postexercise right ankle-brachial index was 0.42 and left side was 0.36.    I explained the pathophysiology of disease to him in detail.  I have explained that with claudication risk of limb loss is low.  I have counseled him on smoking cessation.  No need for invasive intervention at this time.      We will start with Supervised exercise therapy.    I will see him back in 6 months at the [facility] to get an update.

## 2024-11-25 NOTE — PATIENT INSTRUCTIONS
Thank you for allowing us to provide your medical care.  Please see below for the follow up instructions pertaining to your medical appointment with Dr. Cantu.    Follow up plan: visit with Dr. Cantu in 6 months.    Our office will send you a letter by mail, closer to your follow up time frame, with a reminded to call to schedule appointment(s).  Please call our office with any concerns or questions [phone].

## 2024-11-25 NOTE — CONFIDENTIAL NOTE
Patient Quality Outreach    Patient is due for the following:   Hypertension -  BP check    Action(s) Taken:   Schedule a nurse only visit for bp    Type of outreach:    Sent letter.    Questions for provider review:    None           Garrison Herron MA

## 2024-12-02 ENCOUNTER — OFFICE VISIT (OUTPATIENT)
Dept: CARDIOLOGY | Facility: CLINIC | Age: 64
End: 2024-12-02

## 2024-12-02 VITALS
HEART RATE: 68 BPM | HEIGHT: 66 IN | SYSTOLIC BLOOD PRESSURE: 128 MMHG | RESPIRATION RATE: 17 BRPM | WEIGHT: 179 LBS | BODY MASS INDEX: 28.77 KG/M2 | DIASTOLIC BLOOD PRESSURE: 71 MMHG

## 2024-12-02 DIAGNOSIS — E78.5 HYPERLIPIDEMIA LDL GOAL <70: Primary | ICD-10-CM

## 2024-12-02 DIAGNOSIS — Z00.6 EXAMINATION OF PARTICIPANT OR CONTROL IN CLINICAL RESEARCH: ICD-10-CM

## 2024-12-02 PROCEDURE — 99207 PR NO CHARGE-RESEARCH SERVICE: CPT

## 2024-12-02 NOTE — PROGRESS NOTES
"  Study Name: A Phase 3 Randomized, Placebo-Controlled Clinical Study to Evaluate the Efficacy and Safety of MK-0616 in Reducing Major Adverse Cardiovascular Events in   Participants at High Cardiovascular Risk    : Twan Nieves MD      [facility] MK-0616 Study Visit [] 3/Week 4 [x] 4/Week 13 [] 5/Week 26  [] 7/Week 52    Ongoing consent discussion  Reviewed study with him. he confirms he wishes to continue to participate in this trial    Confirmed participant still has participant ID card    Current medications reviewed    [x] Yes  [] No   Updates noted, please see below for details   Current lipid lowering therapy (LLT) reviewed [] Yes  [x] No    Confirmed no changes since last visit    Adverse events solicited [x] Yes  [] No   If AE reported, see below for AE details    /71 (BP Location: Right arm, Patient Position: Sitting, Cuff Size: Adult Regular)   Pulse 68   Resp 17   Ht 1.67 m (5' 5.75\")   Wt 81.2 kg (179 lb)   BMI 29.11 kg/m   done after subject has been seated for more than 5 minutes      Study medication  IRT visit registration completed.  Confirmed last ate food at 1830 01 Dec 2024   Needs to be fasting for at least 8 hours (small amounts of water allowed)      Sent home with kit No [id number] Lot [id number]     Study drug accountability done   100% compliant    Subject confirmed they are compliant with fasting instructions.  ( IP taken after fasting for at least 8 hrs and only eating and drinking at least 30 minutes post IP administration)    [x] Yes  [] No    Returned IP  Kit No [id number]  No of Tablets returned: 9    Clinical Endpoint Review (Clinical Events Assessment)  All-cause mortality (including CV death and non-CV death) [] Yes  [x] No  Potential arterial revascularization - coronary  [] Yes  [x] No  Potential arterial revascularization - cerebrovascular [] Yes  [x] No  Potential arterial revascularization - peripheral   [] Yes  [x] No  Potential Cardiac ischemic events " "  (MI, hospitalization for unstable angina)  [] Yes  [x] No  Potential Cerebrovascular events (Stroke, TIA)   [] Yes  [x] No  Potential peripheral ischemic events   (acute limb ischemia, chronic limb ischemia, other  limb events including amputation)   [x] Yes  [] No    Evaluated 25 Nov 2024 by vascular surgery (Dr. Cantu) for ongoing calf pain. Resting & exercise LESLI performed. From provider notes:    \"Resting right ankle-brachial index is 0.73 and left is 0.74. Patient was able to walk on the treadmill for 3 minutes at a 10 degree incline and at a speed of 1.5 miles an hour. Exercise was terminated due to calf tightness bilaterally. Postexercise right ankle-brachial index was 0.42 and left side was 0.36.\"    No intervention at this time. Follow up planned in six months.       Events of Clinical Interest  Potential DILI [] Yes  [x] No     New-onset diabetes mellitus [] Yes  [x] No  Worsening diabetes mellitus [] Yes [x] No    Vital Status: Subject remains alive and well.    Plan:    Next visit scheduled with participant for 03 Mar 2025 at 10:00      Current Outpatient Medications:     aspirin (ASPIRIN LOW DOSE) 81 MG EC tablet, TAKE 1 TABLET BY MOUTH EVERY DAY, Disp: 30 tablet, Rfl: 5    buPROPion (ZYBAN) 150 MG 12 hr tablet, TAKE 1 TABLET BY MOUTH 2 TIMES DAILY, Disp: 180 tablet, Rfl: 0    carvedilol (COREG) 25 MG tablet, Take 1 tablet (25 mg) by mouth 2 times daily (with meals)., Disp: 180 tablet, Rfl: 1    chlorthalidone (HYGROTON) 25 MG tablet, Take 1 tablet (25 mg) by mouth every morning., Disp: 90 tablet, Rfl: 1    empagliflozin (JARDIANCE) 25 MG TABS tablet, TAKE 1 TABLET BY MOUTH EVERY DAY, Disp: 30 tablet, Rfl: 2    gemfibrozil (LOPID) 600 MG tablet, TAKE 1 TABLET BY MOUTH 2 TIMES DAILY, Disp: 180 tablet, Rfl: 3    ibuprofen (ADVIL/MOTRIN) 600 MG tablet, Take 1 tablet (600 mg) by mouth every 6 hours as needed for moderate pain, Disp: 20 tablet, Rfl: 0    losartan (COZAAR) 100 MG tablet, TAKE 1 TABLET BY " MOUTH EVERY DAY, Disp: 90 tablet, Rfl: 1    metFORMIN (GLUCOPHAGE) 500 MG tablet, Take 2 tablets (1,000 mg) by mouth 2 times daily (with meals)., Disp: 360 tablet, Rfl: 1    multivitamin, therapeutic with minerals (THERA-VIT-M) TABS, Take 1 tablet by mouth every evening, Disp: , Rfl:     nitroGLYcerin (NITROSTAT) 0.4 MG sublingual tablet, For chest pain place 1 tablet under the tongue every 5 minutes for 3 doses. If symptoms persist 5 minutes after 1st dose call 911., Disp: 10 tablet, Rfl: 0    nitroGLYcerin (NITROSTAT) 0.4 MG sublingual tablet, Place 1 tablet (0.4 mg) under the tongue every 5 minutes as needed for chest pain if you are still having symptoms after 3 doses (15 minutes) call 911., Disp: 25 tablet, Rfl: 1    rosuvastatin (CRESTOR) 40 MG tablet, Take 1 tablet (40 mg) by mouth daily., Disp: 90 tablet, Rfl: 3    study drug MK-0616 20 mg or placebo tablet, Take 1 tablet by mouth daily., Disp: , Rfl:       Hermilo Vazquez RN     Clinical Research Nurse  [facility]  Clinical Trials Office  [address] [place] [id number]  [email or web address]   Office: [phone]

## 2024-12-02 NOTE — LETTER
"12/2/2024    Tl Ortiz MD  [address] [place] [address]    RE: Dago Dangelo       Dear Colleague,     I had the [facility] of seeing Dago [facility]angel[facility] the [facility].    Study Name: A Phase 3 Randomized, Placebo-Controlled Clinical Study to Evaluate the Efficacy and Safety of MK-0616 in Reducing Major Adverse Cardiovascular Events in   Participants at High Cardiovascular Risk    : Twan Nieves MD      [facility] MK-0616 Study Visit [] 3/Week 4 [x] 4/Week 13 [] 5/Week 26  [] 7/Week 52    Ongoing consent discussion  Reviewed study with him. he confirms he wishes to continue to participate in this trial    Confirmed participant still has participant ID card    Current medications reviewed    [x] Yes  [] No   Updates noted, please see below for details   Current lipid lowering therapy (LLT) reviewed [] Yes  [x] No    Confirmed no changes since last visit    Adverse events solicited [x] Yes  [] No   If AE reported, see below for AE details    /71 (BP Location: Right arm, Patient Position: Sitting, Cuff Size: Adult Regular)   Pulse 68   Resp 17   Ht 1.67 m (5' 5.75\")   Wt 81.2 kg (179 lb)   BMI 29.11 kg/m   done after subject has been seated for more than 5 minutes      Study medication  IRT visit registration completed.  Confirmed last ate food at 1830 01 Dec 2024   Needs to be fasting for at least 8 hours (small amounts of water allowed)      Sent home with kit No [id number] Lot [id number]     Study drug accountability done   100% compliant    Subject confirmed they are compliant with fasting instructions.  ( IP taken after fasting for at least 8 hrs and only eating and drinking at least 30 minutes post IP administration)    [x] Yes  [] No    Returned IP  Kit No [id number]  No of Tablets returned: 9    Clinical Endpoint Review (Clinical Events Assessment)  All-cause mortality (including CV death and non-CV death) [] Yes  [x] No  Potential arterial " "revascularization - coronary  [] Yes  [x] No  Potential arterial revascularization - cerebrovascular [] Yes  [x] No  Potential arterial revascularization - peripheral   [] Yes  [x] No  Potential Cardiac ischemic events   (MI, hospitalization for unstable angina)  [] Yes  [x] No  Potential Cerebrovascular events (Stroke, TIA)   [] Yes  [x] No  Potential peripheral ischemic events   (acute limb ischemia, chronic limb ischemia, other  limb events including amputation)   [x] Yes  [] No    Evaluated 25 Nov 2024 by vascular surgery (Dr. Cantu) for ongoing calf pain. Resting & exercise LESLI performed. From provider notes:    \"Resting right ankle-brachial index is 0.73 and left is 0.74. Patient was able to walk on the treadmill for 3 minutes at a 10 degree incline and at a speed of 1.5 miles an hour. Exercise was terminated due to calf tightness bilaterally. Postexercise right ankle-brachial index was 0.42 and left side was 0.36.\"    No intervention at this time. Follow up planned in six months.       Events of Clinical Interest  Potential DILI [] Yes  [x] No     New-onset diabetes mellitus [] Yes  [x] No  Worsening diabetes mellitus [] Yes [x] No    Vital Status: Subject remains alive and well.    Plan:    Next visit scheduled with participant for 03 Mar 2025 at 10:00      Current Outpatient Medications:      aspirin (ASPIRIN LOW DOSE) 81 MG EC tablet, TAKE 1 TABLET BY MOUTH EVERY DAY, Disp: 30 tablet, Rfl: 5     buPROPion (ZYBAN) 150 MG 12 hr tablet, TAKE 1 TABLET BY MOUTH 2 TIMES DAILY, Disp: 180 tablet, Rfl: 0     carvedilol (COREG) 25 MG tablet, Take 1 tablet (25 mg) by mouth 2 times daily (with meals)., Disp: 180 tablet, Rfl: 1     chlorthalidone (HYGROTON) 25 MG tablet, Take 1 tablet (25 mg) by mouth every morning., Disp: 90 tablet, Rfl: 1     empagliflozin (JARDIANCE) 25 MG TABS tablet, TAKE 1 TABLET BY MOUTH EVERY DAY, Disp: 30 tablet, Rfl: 2     gemfibrozil (LOPID) 600 MG tablet, TAKE 1 TABLET BY MOUTH 2 TIMES " DAILY, Disp: 180 tablet, Rfl: 3     ibuprofen (ADVIL/MOTRIN) 600 MG tablet, Take 1 tablet (600 mg) by mouth every 6 hours as needed for moderate pain, Disp: 20 tablet, Rfl: 0     losartan (COZAAR) 100 MG tablet, TAKE 1 TABLET BY MOUTH EVERY DAY, Disp: 90 tablet, Rfl: 1     metFORMIN (GLUCOPHAGE) 500 MG tablet, Take 2 tablets (1,000 mg) by mouth 2 times daily (with meals)., Disp: 360 tablet, Rfl: 1     multivitamin, therapeutic with minerals (THERA-VIT-M) TABS, Take 1 tablet by mouth every evening, Disp: , Rfl:      nitroGLYcerin (NITROSTAT) 0.4 MG sublingual tablet, For chest pain place 1 tablet under the tongue every 5 minutes for 3 doses. If symptoms persist 5 minutes after 1st dose call 911., Disp: 10 tablet, Rfl: 0     nitroGLYcerin (NITROSTAT) 0.4 MG sublingual tablet, Place 1 tablet (0.4 mg) under the tongue every 5 minutes as needed for chest pain if you are still having symptoms after 3 doses (15 minutes) call 911., Disp: 25 tablet, Rfl: 1     rosuvastatin (CRESTOR) 40 MG tablet, Take 1 tablet (40 mg) by mouth daily., Disp: 90 tablet, Rfl: 3     study drug MK-0616 20 mg or placebo tablet, Take 1 tablet by mouth daily., Disp: , Rfl:       Hermilo Vazquez RN     Clinical Research Nurse  [facility]  Clinical Trials Office  [facility] [place] [address]  [email or web address]   Office: [phone]      Thank you for allowing me to participate in the care of your patient.      Sincerely,     Hermilo Vazquez RN     [facility]  cc:   Ilana Haskins MD  No address on file

## 2024-12-16 ENCOUNTER — TRANSFERRED RECORDS (OUTPATIENT)
Dept: CARDIOLOGY | Facility: CLINIC | Age: 64
End: 2024-12-16
Payer: COMMERCIAL

## 2024-12-16 NOTE — PROGRESS NOTES
September 30 research labs reviewed and increased glucose noted, these are all not clinically significant.  LF

## 2025-02-15 DIAGNOSIS — R80.9 TYPE 2 DIABETES MELLITUS WITH MICROALBUMINURIA, WITHOUT LONG-TERM CURRENT USE OF INSULIN (H): ICD-10-CM

## 2025-02-15 DIAGNOSIS — I10 BENIGN ESSENTIAL HYPERTENSION: ICD-10-CM

## 2025-02-15 DIAGNOSIS — E11.29 TYPE 2 DIABETES MELLITUS WITH MICROALBUMINURIA, WITHOUT LONG-TERM CURRENT USE OF INSULIN (H): ICD-10-CM

## 2025-02-17 RX ORDER — CARVEDILOL 25 MG/1
25 TABLET ORAL 2 TIMES DAILY WITH MEALS
Qty: 180 TABLET | Refills: 1 | Status: SHIPPED | OUTPATIENT
Start: 2025-02-17

## 2025-02-17 RX ORDER — CHLORTHALIDONE 25 MG/1
25 TABLET ORAL EVERY MORNING
Qty: 90 TABLET | Refills: 1 | Status: SHIPPED | OUTPATIENT
Start: 2025-02-17

## 2025-03-04 DIAGNOSIS — E11.29 TYPE 2 DIABETES MELLITUS WITH MICROALBUMINURIA, WITHOUT LONG-TERM CURRENT USE OF INSULIN (H): ICD-10-CM

## 2025-03-04 DIAGNOSIS — R80.9 TYPE 2 DIABETES MELLITUS WITH MICROALBUMINURIA, WITHOUT LONG-TERM CURRENT USE OF INSULIN (H): ICD-10-CM

## 2025-03-04 RX ORDER — EMPAGLIFLOZIN 25 MG/1
25 TABLET, FILM COATED ORAL DAILY
Qty: 30 TABLET | Refills: 2 | Status: SHIPPED | OUTPATIENT
Start: 2025-03-04

## 2025-03-06 ENCOUNTER — OFFICE VISIT (OUTPATIENT)
Dept: CARDIOLOGY | Facility: CLINIC | Age: 65
End: 2025-03-06
Payer: MEDICARE

## 2025-03-06 VITALS
BODY MASS INDEX: 29.92 KG/M2 | SYSTOLIC BLOOD PRESSURE: 123 MMHG | DIASTOLIC BLOOD PRESSURE: 71 MMHG | WEIGHT: 184 LBS | HEART RATE: 88 BPM

## 2025-03-06 DIAGNOSIS — E78.5 HYPERLIPIDEMIA LDL GOAL <70: Primary | ICD-10-CM

## 2025-03-06 DIAGNOSIS — Z00.6 EXAMINATION OF PARTICIPANT OR CONTROL IN CLINICAL RESEARCH: ICD-10-CM

## 2025-03-06 NOTE — PROGRESS NOTES
Study Name: A Phase 3 Randomized, Placebo-Controlled Clinical Study to Evaluate the Efficacy and Safety of MK-0616 in Reducing Major Adverse Cardiovascular Events in   Participants at High Cardiovascular Risk    : Twan Nieves MD      [facility] MK-0616 Study Visit [] 3/Week 4 [] 4/Week 13 [x] 5/Week 26  [] 7/Week 52    Ongoing consent discussion  Reviewed study with him. he confirms he wishes to continue to participate in this trial    Confirmed participant still has participant ID card    Current medications reviewed    [x] Yes  [] No   Updates noted, please see below for details   Current lipid lowering therapy (LLT) reviewed [x] Yes  [] No    Confirmed no changes since last visit    Adverse events solicited [x] Yes  [] No   If AE reported, see below for AE details    /71 (BP Location: Right arm, Patient Position: Sitting, Cuff Size: Adult Regular)   Pulse 88   Wt 83.5 kg (184 lb)   BMI 29.92 kg/m   done after subject has been seated for more than 5 minutes    Study labs drawn (please refer to lab requisition form for full details)   10:20 Visit 5: plasma for biomarker research drawn (if consented)    Study medication  IRT visit registration completed.  Confirmed last ate food at 1900 05 Mar 25   Needs to be fasting for at least 8 hours (small amounts of water allowed)      Sent home with kit:  No [id number] Lot No [id number]     No [id number] Lot No [id number]      Subject confirmed they are compliant with fasting instructions.  ( IP taken after fasting for at least 8 hrs and only eating and drinking at least 30 minutes post IP administration)  [x] Yes  [] No      Returned IP  Study drug accountability:  Kit No [id number] (date dispensed 02 Dec 24)  # tablets dispensed: 105  # tablets returned 11  # missed doses: 0    = 100% compliant.      Clinical Endpoint Review (Clinical Events Assessment)  All-cause mortality (including CV death and non-CV death) [] Yes  [x] No  Potential arterial  revascularization - coronary  [] Yes  [x] No  Potential arterial revascularization - cerebrovascular [] Yes  [x] No  Potential arterial revascularization - peripheral   [] Yes  [x] No  Potential Cardiac ischemic events   (MI, hospitalization for unstable angina)  [] Yes  [x] No  Potential Cerebrovascular events (Stroke, TIA)   [] Yes  [x] No  Potential peripheral ischemic events   (acute limb ischemia, chronic limb ischemia, other  limb events including amputation)   [x] Yes  [] No    Ongoing claudication reviewed. No changes to symptoms or management at this time. Discussed original onset of symptoms further. Participant reports he first noticed the symptoms occurring regularly on or about 28 May 2023; endorses that he remembers this date specifically due to a . He had no evaluation or diagnosis of the calf cramping, however, until seeing Dr Cantu (vasc surg) in 2024 (see previous AE report).        Events of Clinical Interest  Potential DILI [] Yes  [x] No     New-onset diabetes mellitus [] Yes  [x] No  Worsening diabetes mellitus [] Yes [x] No    Vital Status: Subject remains alive and well.    Plan:    Next visit scheduled with participant for 2025 at 0900 (phone)      Current Outpatient Medications:     aspirin (ASPIRIN LOW DOSE) 81 MG EC tablet, TAKE 1 TABLET BY MOUTH EVERY DAY, Disp: 30 tablet, Rfl: 5    buPROPion (ZYBAN) 150 MG 12 hr tablet, TAKE 1 TABLET BY MOUTH 2 TIMES DAILY, Disp: 180 tablet, Rfl: 0    carvedilol (COREG) 25 MG tablet, TAKE 1 TABLET BY MOUTH TWICE A DAY WITH MEALS, Disp: 180 tablet, Rfl: 1    chlorthalidone (HYGROTON) 25 MG tablet, TAKE 1 TABLET BY MOUTH EVERY MORNING, Disp: 90 tablet, Rfl: 1    gemfibrozil (LOPID) 600 MG tablet, TAKE 1 TABLET BY MOUTH 2 TIMES DAILY, Disp: 180 tablet, Rfl: 3    ibuprofen (ADVIL/MOTRIN) 600 MG tablet, Take 1 tablet (600 mg) by mouth every 6 hours as needed for moderate pain, Disp: 20 tablet, Rfl: 0    JARDIANCE 25 MG TABS tablet, TAKE 1  TABLET BY MOUTH EVERY DAY, Disp: 30 tablet, Rfl: 2    losartan (COZAAR) 100 MG tablet, TAKE 1 TABLET BY MOUTH EVERY DAY, Disp: 90 tablet, Rfl: 1    metFORMIN (GLUCOPHAGE) 500 MG tablet, TAKE 2 TABLETS BY MOUTH 2 TIMES DAILY WITH MEALS, Disp: 360 tablet, Rfl: 1    multivitamin, therapeutic with minerals (THERA-VIT-M) TABS, Take 1 tablet by mouth every evening, Disp: , Rfl:     nitroGLYcerin (NITROSTAT) 0.4 MG sublingual tablet, For chest pain place 1 tablet under the tongue every 5 minutes for 3 doses. If symptoms persist 5 minutes after 1st dose call 911., Disp: 10 tablet, Rfl: 0    nitroGLYcerin (NITROSTAT) 0.4 MG sublingual tablet, Place 1 tablet (0.4 mg) under the tongue every 5 minutes as needed for chest pain if you are still having symptoms after 3 doses (15 minutes) call 911., Disp: 25 tablet, Rfl: 1    rosuvastatin (CRESTOR) 40 MG tablet, Take 1 tablet (40 mg) by mouth daily., Disp: 90 tablet, Rfl: 3    Current Facility-Administered Medications:     study drug MK-0616 20 mg or placebo tablet 1 tablet, 1 tablet, Oral, Daily, Jessi Nieves MD Jason Hyatt, RN     Clinical Research Nurse  [facility]  Clinical Trials Office  [facility] [place] [address]  [email or web address]   Office: [phone]

## 2025-03-23 ENCOUNTER — HOSPITAL ENCOUNTER (EMERGENCY)
Dept: HOSPITAL 74 - JER | Age: 65
Discharge: HOME | End: 2025-03-23
Payer: COMMERCIAL

## 2025-03-23 VITALS — SYSTOLIC BLOOD PRESSURE: 155 MMHG | RESPIRATION RATE: 18 BRPM | DIASTOLIC BLOOD PRESSURE: 89 MMHG

## 2025-03-23 VITALS — HEART RATE: 82 BPM

## 2025-03-23 VITALS — TEMPERATURE: 98.5 F

## 2025-03-23 VITALS — BODY MASS INDEX: 18.8 KG/M2

## 2025-03-23 DIAGNOSIS — R06.02: ICD-10-CM

## 2025-03-23 DIAGNOSIS — R60.0: Primary | ICD-10-CM

## 2025-03-23 DIAGNOSIS — R20.2: ICD-10-CM

## 2025-03-23 LAB
ABSOLUTE IMMATURE GRANULOCYTES: 0.01 X10^3/UL (ref 0–0.03)
ALBUMIN SERPL-MCNC: 3 G/DL (ref 3.4–5)
ALP SERPL-CCNC: 108 U/L (ref 45–117)
ALT SERPL-CCNC: 148 U/L (ref 13–61)
ANION GAP SERPL CALC-SCNC: 6 MMOL/L (ref 4–13)
APPEARANCE UR: CLEAR
AST SERPL-CCNC: 80 U/L (ref 15–37)
BASOPHILS #: 0.03 X10^3/UL (ref 0.01–0.08)
BILIRUB SERPL-MCNC: 0.5 MG/DL (ref 0.2–1)
BILIRUB UR STRIP.AUTO-MCNC: NEGATIVE MG/DL
BNP SERPL-MCNC: 91.8 PG/ML (ref 5–125)
BUN SERPL-MCNC: 13.3 MG/DL (ref 7–18)
CALCIUM SERPL-MCNC: 9 MG/DL (ref 8.5–10.1)
CHLORIDE SERPL-SCNC: 100 MMOL/L (ref 98–107)
CO2 SERPL-SCNC: 29 MMOL/L (ref 21–32)
COLOR UR: YELLOW
CREAT SERPL-MCNC: 0.8 MG/DL (ref 0.55–1.3)
EOSINOPHIL %: 6.5 % (ref 0.8–7)
EOSINOPHILS #: 0.26 X10^3/UL (ref 0.04–0.54)
GLUCOSE SERPL-MCNC: 404 MG/DL (ref 74–106)
HEMATOCRIT: 35 % (ref 40.1–51)
HEMOGLOBIN: 10.8 G/DL (ref 13.7–17.5)
HIV 1+2 AB+HIV1 P24 AG SERPL QL IA: NEGATIVE
KETONES UR QL STRIP: NEGATIVE
LEUKOCYTE ESTERASE UR QL STRIP.AUTO: NEGATIVE
MCHC: 30.9 G/DL (ref 32.3–36.5)
MEAN CELL VOLUME: 73.8 FL (ref 79–92.2)
MEAN PLT VOLUME: 9.9 FL (ref 9.4–12.4)
MONOCYTE #: 0.24 X10^3/UL (ref 0.3–0.82)
MONOCYTE %: 6 % (ref 5.3–12.2)
NITRITE UR QL STRIP: NEGATIVE
PH UR: 7 [PH] (ref 5–8)
POTASSIUM SERPLBLD-SCNC: 5.3 MMOL/L (ref 3.5–5.1)
PROT SERPL-MCNC: 6.4 G/DL (ref 6.4–8.2)
PROT UR QL STRIP: (no result)
PROT UR QL STRIP: (no result)
RDW: 14.1 % (ref 12.2–16.4)
SODIUM SERPL-SCNC: 134 MMOL/L (ref 136–145)
SP GR UR: 1.04 (ref 1.01–1.03)
UROBILINOGEN UR STRIP-MCNC: 0.2 MG/DL (ref 0.2–1)

## 2025-03-23 PROCEDURE — 3E033GC INTRODUCTION OF OTHER THERAPEUTIC SUBSTANCE INTO PERIPHERAL VEIN, PERCUTANEOUS APPROACH: ICD-10-PCS | Performed by: EMERGENCY MEDICINE

## 2025-03-23 RX ADMIN — ACETAMINOPHEN ONE MG: 500 TABLET, FILM COATED ORAL at 08:32

## 2025-03-23 RX ADMIN — HUMAN INSULIN ONE UNITS: 100 INJECTION, SOLUTION SUBCUTANEOUS at 08:54

## 2025-04-28 DIAGNOSIS — I10 BENIGN ESSENTIAL HYPERTENSION: ICD-10-CM

## 2025-04-28 RX ORDER — LOSARTAN POTASSIUM 100 MG/1
100 TABLET ORAL DAILY
Qty: 90 TABLET | Refills: 0 | Status: SHIPPED | OUTPATIENT
Start: 2025-04-28

## 2025-05-03 ENCOUNTER — HEALTH MAINTENANCE LETTER (OUTPATIENT)
Age: 65
End: 2025-05-03

## 2025-05-13 DIAGNOSIS — E78.1 HYPERTRIGLYCERIDEMIA: ICD-10-CM

## 2025-05-13 RX ORDER — GEMFIBROZIL 600 MG/1
600 TABLET, FILM COATED ORAL 2 TIMES DAILY
Qty: 180 TABLET | Refills: 0 | Status: SHIPPED | OUTPATIENT
Start: 2025-05-13

## 2025-05-29 ENCOUNTER — TELEPHONE (OUTPATIENT)
Dept: CARDIOLOGY | Facility: CLINIC | Age: 65
End: 2025-05-29
Payer: MEDICARE

## 2025-05-29 DIAGNOSIS — Z00.6 EXAMINATION OF PARTICIPANT OR CONTROL IN CLINICAL RESEARCH: ICD-10-CM

## 2025-05-29 DIAGNOSIS — E78.5 HYPERLIPIDEMIA LDL GOAL <70: Primary | ICD-10-CM

## 2025-05-29 NOTE — TELEPHONE ENCOUNTER
"  Study Name: A Phase 3 Randomized, Placebo-Controlled Clinical Study to Evaluate the Efficacy and Safety of MK-0616 in Reducing Major Adverse Cardiovascular Events in   Participants at High Cardiovascular Risk    : Twan Nieves MD  Protocol Version: 015-05, [id number]      Phone call visit -- Update re: changes to ICF [07 Feb 2024]    Ongoing consent discussion  Reviewed study with him including all changes to ICF. Changes included:    Pg 3 - Discussion of LDL monitoring plan; addition of:  Your trial doctor may contact you from time to time to remind you about the importance of taking all of your medications and may request that you have additional testing done.     Pg 4 -- Added \"You may be asked by the trial doctor to have testing done at additional trial visits if needed.\"    Pg 5--Added  Tell the trial doctor if you have taken part in, or are planning to take part in any other clinical trial.     Pg 8 -- Added \"Items of small value may also be provided\"     Pg 11 - Corrected spelling of  Linus Lin.     he confirms he wishes to continue to participate in this trial.    Updated ICF will be signed at next in-person clinic visit.        Hermilo Vazquez RN     Clinical Research Nurse  [facility]  Clinical Trials Office  [address] [place] [id number]  [email or web address]   Office: [phone]    "

## 2025-06-06 ENCOUNTER — OFFICE VISIT (OUTPATIENT)
Dept: CARDIOLOGY | Facility: CLINIC | Age: 65
End: 2025-06-06
Payer: MEDICARE

## 2025-06-06 DIAGNOSIS — Z00.6 EXAMINATION OF PARTICIPANT OR CONTROL IN CLINICAL RESEARCH: ICD-10-CM

## 2025-06-06 DIAGNOSIS — E78.5 HYPERLIPIDEMIA LDL GOAL <70: Primary | ICD-10-CM

## 2025-06-06 NOTE — PROGRESS NOTES
Study Name: A Phase 3 Randomized, Placebo-Controlled Clinical Study to Evaluate the Efficacy and Safety of MK-0616 in Reducing Major Adverse Cardiovascular Events in   Participants at High Cardiovascular Risk    : Twan Nieves MD  Protocol Version: 015-05, [id number]    [facility] MK-0616 Study Visit [x] 6/Week 39  [] 8/Week 65   [] 10/Week 91  [] 12/Week 117 [] 14/Week 143    In-person visit to retest LDL-C per LDL monitoring protocol. Study labs drawn @11:18. Results will be scanned to Media tab.    Ongoing consent discussion  Reviewed study with him. He confirms he wishes to continue to participate in this trial.   Updated consent [05 Nov 2024] signed.     Confirmed participant still has participant ID card    Current medications reviewed    [x] Yes  [] No   Updates noted, please see below for details   Current lipid lowering therapy (LLT) reviewed [x] Yes  [] No    Confirmed no changes since last visit    Adverse events solicited [x] Yes  [] No   If AE reported, see below for AE details    Study medication.    Study drug accountability done   100% compliant    Subject confirmed they are compliant with fasting instructions.  ( IP taken after fasting for at least 8 hrs and only eating and drinking at least 30 minutes post IP administration)    [] Yes  [] No      Clinical Endpoint Review (Clinical Events Assessment)  All-cause mortality (including CV death and non-CV death) [] Yes  [x] No  No Potential arterial revascularization - coronary  [] Yes  [x] No  Potential arterial revascularization - cerebrovascular [] Yes  [x] No  Potential arterial revascularization - peripheral   [] Yes  [x] No  Potential Cardiac ischemic events   (MI, hospitalization for unstable angina)  [] Yes  [x] No  Potential Cerebrovascular events (Stroke, TIA)   [] Yes  [x] No  Potential peripheral ischemic events   (acute limb ischemia, chronic limb ischemia, other  limb events including amputation)   [] Yes  [x]  No    Events of Clinical Interest  Potential DILI [] Yes  [x] No     New-onset diabetes mellitus [] Yes  [x] No  Worsening diabetes mellitus [] Yes [x] No    Vital Status: Subject remains alive and well.    Plan:    Next visit scheduled with participant for       Current Outpatient Medications:     aspirin (ASPIRIN LOW DOSE) 81 MG EC tablet, TAKE 1 TABLET BY MOUTH EVERY DAY, Disp: 30 tablet, Rfl: 5    buPROPion (ZYBAN) 150 MG 12 hr tablet, TAKE 1 TABLET BY MOUTH 2 TIMES DAILY, Disp: 180 tablet, Rfl: 0    carvedilol (COREG) 25 MG tablet, TAKE 1 TABLET BY MOUTH TWICE A DAY WITH MEALS, Disp: 180 tablet, Rfl: 1    chlorthalidone (HYGROTON) 25 MG tablet, TAKE 1 TABLET BY MOUTH EVERY MORNING, Disp: 90 tablet, Rfl: 1    gemfibrozil (LOPID) 600 MG tablet, TAKE 1 TABLET BY MOUTH 2 TIMES DAILY, Disp: 180 tablet, Rfl: 0    ibuprofen (ADVIL/MOTRIN) 600 MG tablet, Take 1 tablet (600 mg) by mouth every 6 hours as needed for moderate pain, Disp: 20 tablet, Rfl: 0    JARDIANCE 25 MG TABS tablet, TAKE 1 TABLET BY MOUTH EVERY DAY, Disp: 30 tablet, Rfl: 2    losartan (COZAAR) 100 MG tablet, TAKE 1 TABLET BY MOUTH EVERY DAY, Disp: 90 tablet, Rfl: 0    metFORMIN (GLUCOPHAGE) 500 MG tablet, TAKE 2 TABLETS BY MOUTH 2 TIMES DAILY WITH MEALS, Disp: 360 tablet, Rfl: 1    multivitamin, therapeutic with minerals (THERA-VIT-M) TABS, Take 1 tablet by mouth every evening, Disp: , Rfl:     nitroGLYcerin (NITROSTAT) 0.4 MG sublingual tablet, For chest pain place 1 tablet under the tongue every 5 minutes for 3 doses. If symptoms persist 5 minutes after 1st dose call 911., Disp: 10 tablet, Rfl: 0    nitroGLYcerin (NITROSTAT) 0.4 MG sublingual tablet, Place 1 tablet (0.4 mg) under the tongue every 5 minutes as needed for chest pain if you are still having symptoms after 3 doses (15 minutes) call 911., Disp: 25 tablet, Rfl: 1    rosuvastatin (CRESTOR) 40 MG tablet, Take 1 tablet (40 mg) by mouth daily., Disp: 90 tablet, Rfl: 3    Current  Facility-Administered Medications:     study drug MK-0616 20 mg or placebo tablet 1 tablet, 1 tablet, Oral, Daily, Jessi Nieves MD Jason Hyatt, RN     Clinical Research Nurse  [facility]  Clinical Trials Office  [facility] [place] [address]  [email or web address]   Office: [phone]

## 2025-06-07 DIAGNOSIS — E11.29 TYPE 2 DIABETES MELLITUS WITH MICROALBUMINURIA, WITHOUT LONG-TERM CURRENT USE OF INSULIN (H): ICD-10-CM

## 2025-06-07 DIAGNOSIS — R80.9 TYPE 2 DIABETES MELLITUS WITH MICROALBUMINURIA, WITHOUT LONG-TERM CURRENT USE OF INSULIN (H): ICD-10-CM

## 2025-06-09 RX ORDER — EMPAGLIFLOZIN 25 MG/1
25 TABLET, FILM COATED ORAL DAILY
Qty: 90 TABLET | Refills: 0 | Status: SHIPPED | OUTPATIENT
Start: 2025-06-09

## 2025-07-31 DIAGNOSIS — I10 BENIGN ESSENTIAL HYPERTENSION: ICD-10-CM

## 2025-07-31 RX ORDER — LOSARTAN POTASSIUM 100 MG/1
100 TABLET ORAL DAILY
Qty: 90 TABLET | Refills: 0 | Status: SHIPPED | OUTPATIENT
Start: 2025-07-31

## 2025-08-09 DIAGNOSIS — E78.1 HYPERTRIGLYCERIDEMIA: ICD-10-CM

## 2025-08-11 RX ORDER — GEMFIBROZIL 600 MG/1
600 TABLET, FILM COATED ORAL 2 TIMES DAILY
Qty: 180 TABLET | Refills: 3 | Status: SHIPPED | OUTPATIENT
Start: 2025-08-11

## 2025-08-18 DIAGNOSIS — I10 BENIGN ESSENTIAL HYPERTENSION: ICD-10-CM

## 2025-08-19 RX ORDER — CHLORTHALIDONE 25 MG/1
25 TABLET ORAL EVERY MORNING
Qty: 90 TABLET | Refills: 0 | Status: SHIPPED | OUTPATIENT
Start: 2025-08-19

## (undated) DEVICE — LINEN ORTHO PACK 5446

## (undated) DEVICE — ESU PENCIL W/HOLSTER E2350H

## (undated) DEVICE — SUCTION MANIFOLD NEPTUNE 2 SYS 4 PORT 0702-020-000

## (undated) DEVICE — BLADE KNIFE SURG 15 371115

## (undated) DEVICE — PAD ABD 7.5INW X 8INL NON-WOVEN FLUFF-FILLED 9192A

## (undated) DEVICE — COVER CAMERA IN-LIGHT DISP LT-C02

## (undated) DEVICE — Device

## (undated) DEVICE — TOURNIQUET CUFF 18" REPRO RED 60-7070-103

## (undated) DEVICE — DRSG STERI STRIP 1/2X4" R1547

## (undated) DEVICE — CAST STOCKINETTE 4" COTTON 30-7004

## (undated) DEVICE — ESU GROUND PAD ADULT W/CORD E7507

## (undated) DEVICE — CAST PADDING 6" STERILE 9046S

## (undated) DEVICE — SOL NACL 0.9% IRRIG 1000ML BOTTLE 2F7124

## (undated) DEVICE — LIGHT HANDLE X1 31140133

## (undated) DEVICE — LINEN TOWEL PACK X5 5464

## (undated) DEVICE — LINEN GOWN XLG 5407

## (undated) DEVICE — SU VICRYL 2-0 CT-2 27" UND J269H

## (undated) DEVICE — SU FIBERWIRE 2 38"  AR-7200

## (undated) DEVICE — SU VICRYL 0 CT-2 27" J334H

## (undated) DEVICE — PEN MARKING SKIN VISIMARK 1424SR

## (undated) DEVICE — STRAP KNEE/BODY 31143004

## (undated) DEVICE — GLOVE BIOGEL PI MICRO INDICATOR UNDERGLOVE SZ 7.5 48975

## (undated) DEVICE — PREP SCRUB SOL EXIDINE 4% CHG 4OZ 29002-404

## (undated) DEVICE — SU MONOCRYL 3-0 PS-1 27" Y936H

## (undated) DEVICE — GLOVE BIOGEL PI MICRO SZ 7.0 48570

## (undated) RX ORDER — HYDROMORPHONE HYDROCHLORIDE 1 MG/ML
INJECTION, SOLUTION INTRAMUSCULAR; INTRAVENOUS; SUBCUTANEOUS
Status: DISPENSED
Start: 2023-02-01

## (undated) RX ORDER — BUPIVACAINE HYDROCHLORIDE 2.5 MG/ML
INJECTION, SOLUTION INFILTRATION; PERINEURAL
Status: DISPENSED
Start: 2023-02-01

## (undated) RX ORDER — ACETAMINOPHEN 325 MG/1
TABLET ORAL
Status: DISPENSED
Start: 2023-02-01

## (undated) RX ORDER — FENTANYL CITRATE-0.9 % NACL/PF 10 MCG/ML
PLASTIC BAG, INJECTION (ML) INTRAVENOUS
Status: DISPENSED
Start: 2023-02-01

## (undated) RX ORDER — FENTANYL CITRATE 50 UG/ML
INJECTION, SOLUTION INTRAMUSCULAR; INTRAVENOUS
Status: DISPENSED
Start: 2023-02-01

## (undated) RX ORDER — ONDANSETRON 2 MG/ML
INJECTION INTRAMUSCULAR; INTRAVENOUS
Status: DISPENSED
Start: 2023-02-01

## (undated) RX ORDER — PROPOFOL 10 MG/ML
INJECTION, EMULSION INTRAVENOUS
Status: DISPENSED
Start: 2023-02-01